# Patient Record
Sex: FEMALE | Race: WHITE | NOT HISPANIC OR LATINO | URBAN - METROPOLITAN AREA
[De-identification: names, ages, dates, MRNs, and addresses within clinical notes are randomized per-mention and may not be internally consistent; named-entity substitution may affect disease eponyms.]

---

## 2021-01-03 ENCOUNTER — EMERGENCY (EMERGENCY)
Facility: HOSPITAL | Age: 86
LOS: 1 days | Discharge: HOME | End: 2021-01-03
Attending: EMERGENCY MEDICINE
Payer: COMMERCIAL

## 2021-01-03 VITALS
TEMPERATURE: 98 F | RESPIRATION RATE: 18 BRPM | DIASTOLIC BLOOD PRESSURE: 81 MMHG | HEART RATE: 83 BPM | OXYGEN SATURATION: 100 % | SYSTOLIC BLOOD PRESSURE: 157 MMHG

## 2021-01-03 VITALS
HEIGHT: 68 IN | WEIGHT: 199.96 LBS | RESPIRATION RATE: 16 BRPM | DIASTOLIC BLOOD PRESSURE: 99 MMHG | SYSTOLIC BLOOD PRESSURE: 194 MMHG | OXYGEN SATURATION: 99 % | TEMPERATURE: 98 F | HEART RATE: 88 BPM

## 2021-01-03 PROCEDURE — 99283 EMERGENCY DEPT VISIT LOW MDM: CPT

## 2021-01-03 PROCEDURE — 99284 EMERGENCY DEPT VISIT MOD MDM: CPT

## 2021-01-03 RX ORDER — HYDRALAZINE HCL 50 MG
50 TABLET ORAL ONCE
Refills: 0 | Status: COMPLETED | OUTPATIENT
Start: 2021-01-03 | End: 2021-01-03

## 2021-01-03 RX ADMIN — Medication 50 MILLIGRAM(S): at 15:04

## 2021-01-03 NOTE — ED PROVIDER NOTE - ATTENDING CONTRIBUTION TO CARE
89y F hx HTN on multiple BP medications here with asx HTN. Pt supposed to have started an additional med, hydralazine, Rx by cards last week, but has not yet. No CP, back pain, abd pain, slurred speech, weakness, numbness, tingling, HA, vision changes. BP currently 161/97. Pt has Rx filled at pharmacy. Can dose here. No ED W/U indicated for asx HTN at this time. D/w son by resident. Will give return precautions for signs of HTN urgency/emergency.

## 2021-01-03 NOTE — ED PROVIDER NOTE - CLINICAL SUMMARY MEDICAL DECISION MAKING FREE TEXT BOX
Julian (PGY-3): 89-yo F w/ PMH of HTN, presenting with high blood pressure reading at home with 210s/110s, s/p furosemide 40 mg at home. 194/99 on arrival, down to 161/97 w/o further med. Patient is supposed to be starting hydralazine 50 mg TID, not picked up. Will provide 1 dose hydralazine 50 mg and monitor response. Will discharge after. Julian (PGY-3): 89-yo F w/ PMH of HTN, presenting with high blood pressure reading at home with 210s/110s, s/p furosemide 40 mg at home. 194/99 on arrival, down to 161/97 w/o further med. Patient is supposed to be starting hydralazine 50 mg TID, not picked up. Will provide 1 dose hydralazine 50 mg and monitor response. Will discharge after. D/w son. Advised pt asx, and will need to obtain meds and FU with PCP.

## 2021-01-03 NOTE — ED ADULT NURSE NOTE - NSIMPLEMENTINTERV_GEN_ALL_ED
Implemented All Fall with Harm Risk Interventions:  [place] to call system. Call bell, personal items and telephone within reach. Instruct patient to call for assistance. Room bathroom lighting operational. Non-slip footwear when patient is off stretcher. Physically safe environment: no spills, clutter or unnecessary equipment. Stretcher in lowest position, wheels locked, appropriate side rails in place. Provide visual cue, wrist band, yellow gown, etc. Monitor gait and stability. Monitor for mental status changes and reorient to person, place, and time. Review medications for side effects contributing to fall risk. Reinforce activity limits and safety measures with patient and family. Provide visual clues: red socks.

## 2021-01-03 NOTE — ED ADULT NURSE NOTE - OBJECTIVE STATEMENT
89 y/o female PMH HTN presents to ED reporting high blood pressure. Pt reports son checked BP today, and discovered it was elevated. Pt reports headache yesterday evening and took Tylenol, relieved by Tylenol. On exam, AOx3 speaking in complete sentences. Unlabored, spontaneous respirations, NAD, O2 sat 97% RA Abdomen soft, non-tender, non-distended. Pt denies CP, SOB, n/v/d, fever/chills, urinary symptoms, h/a and blurry vision. Awaiting evaluation by MD at this time.

## 2021-01-03 NOTE — ED PROVIDER NOTE - PATIENT PORTAL LINK FT
You can access the FollowMyHealth Patient Portal offered by [facility] by registering at the following website: [email or web address]. By joining [facility]’s FollowMyHealth portal, you will also be able to view your health information using other applications (apps) compatible with our system.

## 2021-01-03 NOTE — ED PROVIDER NOTE - OBJECTIVE STATEMENT
89-yo F w/ PMH of HTN, presenting with high blood pressure reading. Per son, BP 210s/110s at home AM. Does not check BP regularly. SBP at 170s at baseline. Took furosemide 40 mg only and prompted to ED. Did not complain of headache, nausea, or vomiting this AM. Had HA last night but not this AM. Patient is on carvedilol 25 mg BID, furosemide 40 mg QD, and lisinopril 20 mg QD. Cardiologist (Dr. Han) started on new med (hydralazine 50 mg TID) last week, however son did not have the chance to pick it up. Currently denies fever, chills, headache, blurry vision, SOB, chest pain, palpitations, abdominal pain, or dizziness.

## 2021-01-03 NOTE — ED ADULT TRIAGE NOTE - CHIEF COMPLAINT QUOTE
my so took my blood pressure and he said it was high pt feels ok no pain anywhere Pt did not take her blood pressure medication tosay

## 2021-12-22 NOTE — ED PROVIDER NOTE - CCCP TRG CHIEF CMPLNT
Hpi Title: Evaluation of Skin Lesions
Family Member: mother, father
Year Removed: 1900
pt states her blood pressure was high at home/see chief complaint quote

## 2023-04-13 ENCOUNTER — INPATIENT (INPATIENT)
Facility: HOSPITAL | Age: 88
LOS: 5 days | Discharge: NURSING, REHAB OR LONG-TERM CARE | DRG: 522 | End: 2023-04-19
Attending: GENERAL ACUTE CARE HOSPITAL | Admitting: GENERAL ACUTE CARE HOSPITAL
Payer: COMMERCIAL

## 2023-04-13 ENCOUNTER — TRANSCRIPTION ENCOUNTER (OUTPATIENT)
Age: 88
End: 2023-04-13

## 2023-04-13 VITALS
OXYGEN SATURATION: 96 % | SYSTOLIC BLOOD PRESSURE: 134 MMHG | WEIGHT: 149.91 LBS | RESPIRATION RATE: 15 BRPM | HEART RATE: 65 BPM | DIASTOLIC BLOOD PRESSURE: 72 MMHG | HEIGHT: 62 IN | TEMPERATURE: 99 F

## 2023-04-13 LAB
ALBUMIN SERPL ELPH-MCNC: 4.4 G/DL — SIGNIFICANT CHANGE UP (ref 3.3–5)
ALP SERPL-CCNC: 65 U/L — SIGNIFICANT CHANGE UP (ref 40–120)
ALT FLD-CCNC: 13 U/L — SIGNIFICANT CHANGE UP (ref 10–45)
ANION GAP SERPL CALC-SCNC: 14 MMOL/L — SIGNIFICANT CHANGE UP (ref 5–17)
APTT BLD: 28.8 SEC — SIGNIFICANT CHANGE UP (ref 27.5–35.5)
AST SERPL-CCNC: 21 U/L — SIGNIFICANT CHANGE UP (ref 10–40)
BASOPHILS # BLD AUTO: 0.02 K/UL — SIGNIFICANT CHANGE UP (ref 0–0.2)
BASOPHILS NFR BLD AUTO: 0.2 % — SIGNIFICANT CHANGE UP (ref 0–2)
BILIRUB SERPL-MCNC: 0.7 MG/DL — SIGNIFICANT CHANGE UP (ref 0.2–1.2)
BLD GP AB SCN SERPL QL: NEGATIVE — SIGNIFICANT CHANGE UP
BUN SERPL-MCNC: 35 MG/DL — HIGH (ref 7–23)
CALCIUM SERPL-MCNC: 10.3 MG/DL — SIGNIFICANT CHANGE UP (ref 8.4–10.5)
CHLORIDE SERPL-SCNC: 102 MMOL/L — SIGNIFICANT CHANGE UP (ref 96–108)
CO2 SERPL-SCNC: 22 MMOL/L — SIGNIFICANT CHANGE UP (ref 22–31)
CREAT SERPL-MCNC: 1.81 MG/DL — HIGH (ref 0.5–1.3)
EGFR: 25 ML/MIN/1.73M2 — LOW
EOSINOPHIL # BLD AUTO: 0.02 K/UL — SIGNIFICANT CHANGE UP (ref 0–0.5)
EOSINOPHIL NFR BLD AUTO: 0.2 % — SIGNIFICANT CHANGE UP (ref 0–6)
GLUCOSE SERPL-MCNC: 116 MG/DL — HIGH (ref 70–99)
HCT VFR BLD CALC: 32 % — LOW (ref 34.5–45)
HGB BLD-MCNC: 10.2 G/DL — LOW (ref 11.5–15.5)
IMM GRANULOCYTES NFR BLD AUTO: 0.6 % — SIGNIFICANT CHANGE UP (ref 0–0.9)
INR BLD: 1.11 RATIO — SIGNIFICANT CHANGE UP (ref 0.88–1.16)
LYMPHOCYTES # BLD AUTO: 1.28 K/UL — SIGNIFICANT CHANGE UP (ref 1–3.3)
LYMPHOCYTES # BLD AUTO: 10.2 % — LOW (ref 13–44)
MCHC RBC-ENTMCNC: 30.3 PG — SIGNIFICANT CHANGE UP (ref 27–34)
MCHC RBC-ENTMCNC: 31.9 GM/DL — LOW (ref 32–36)
MCV RBC AUTO: 95 FL — SIGNIFICANT CHANGE UP (ref 80–100)
MONOCYTES # BLD AUTO: 0.28 K/UL — SIGNIFICANT CHANGE UP (ref 0–0.9)
MONOCYTES NFR BLD AUTO: 2.2 % — SIGNIFICANT CHANGE UP (ref 2–14)
NEUTROPHILS # BLD AUTO: 10.84 K/UL — HIGH (ref 1.8–7.4)
NEUTROPHILS NFR BLD AUTO: 86.6 % — HIGH (ref 43–77)
NRBC # BLD: 0 /100 WBCS — SIGNIFICANT CHANGE UP (ref 0–0)
PLATELET # BLD AUTO: 190 K/UL — SIGNIFICANT CHANGE UP (ref 150–400)
POTASSIUM SERPL-MCNC: 4.5 MMOL/L — SIGNIFICANT CHANGE UP (ref 3.5–5.3)
POTASSIUM SERPL-SCNC: 4.5 MMOL/L — SIGNIFICANT CHANGE UP (ref 3.5–5.3)
PROT SERPL-MCNC: 7.9 G/DL — SIGNIFICANT CHANGE UP (ref 6–8.3)
PROTHROM AB SERPL-ACNC: 12.9 SEC — SIGNIFICANT CHANGE UP (ref 10.5–13.4)
RBC # BLD: 3.37 M/UL — LOW (ref 3.8–5.2)
RBC # FLD: 13 % — SIGNIFICANT CHANGE UP (ref 10.3–14.5)
RH IG SCN BLD-IMP: POSITIVE — SIGNIFICANT CHANGE UP
SODIUM SERPL-SCNC: 138 MMOL/L — SIGNIFICANT CHANGE UP (ref 135–145)
WBC # BLD: 12.51 K/UL — HIGH (ref 3.8–10.5)
WBC # FLD AUTO: 12.51 K/UL — HIGH (ref 3.8–10.5)

## 2023-04-13 PROCEDURE — 73552 X-RAY EXAM OF FEMUR 2/>: CPT | Mod: 26,LT

## 2023-04-13 PROCEDURE — 99285 EMERGENCY DEPT VISIT HI MDM: CPT

## 2023-04-13 PROCEDURE — 71045 X-RAY EXAM CHEST 1 VIEW: CPT | Mod: 26

## 2023-04-13 PROCEDURE — 73502 X-RAY EXAM HIP UNI 2-3 VIEWS: CPT | Mod: 26,LT

## 2023-04-13 RX ORDER — SODIUM CHLORIDE 9 MG/ML
1000 INJECTION INTRAMUSCULAR; INTRAVENOUS; SUBCUTANEOUS
Refills: 0 | Status: DISCONTINUED | OUTPATIENT
Start: 2023-04-13 | End: 2023-04-14

## 2023-04-13 RX ORDER — ACETAMINOPHEN 500 MG
975 TABLET ORAL EVERY 8 HOURS
Refills: 0 | Status: DISCONTINUED | OUTPATIENT
Start: 2023-04-13 | End: 2023-04-14

## 2023-04-13 RX ORDER — MORPHINE SULFATE 50 MG/1
2 CAPSULE, EXTENDED RELEASE ORAL ONCE
Refills: 0 | Status: DISCONTINUED | OUTPATIENT
Start: 2023-04-13 | End: 2023-04-13

## 2023-04-13 RX ORDER — OXYCODONE HYDROCHLORIDE 5 MG/1
5 TABLET ORAL EVERY 4 HOURS
Refills: 0 | Status: DISCONTINUED | OUTPATIENT
Start: 2023-04-13 | End: 2023-04-14

## 2023-04-13 RX ORDER — OXYCODONE HYDROCHLORIDE 5 MG/1
2.5 TABLET ORAL EVERY 4 HOURS
Refills: 0 | Status: DISCONTINUED | OUTPATIENT
Start: 2023-04-13 | End: 2023-04-14

## 2023-04-13 RX ORDER — SODIUM CHLORIDE 9 MG/ML
500 INJECTION INTRAMUSCULAR; INTRAVENOUS; SUBCUTANEOUS ONCE
Refills: 0 | Status: COMPLETED | OUTPATIENT
Start: 2023-04-13 | End: 2023-04-13

## 2023-04-13 RX ADMIN — MORPHINE SULFATE 2 MILLIGRAM(S): 50 CAPSULE, EXTENDED RELEASE ORAL at 21:31

## 2023-04-13 RX ADMIN — MORPHINE SULFATE 2 MILLIGRAM(S): 50 CAPSULE, EXTENDED RELEASE ORAL at 21:01

## 2023-04-13 RX ADMIN — SODIUM CHLORIDE 500 MILLILITER(S): 9 INJECTION INTRAMUSCULAR; INTRAVENOUS; SUBCUTANEOUS at 23:48

## 2023-04-13 NOTE — ED PROVIDER NOTE - OBJECTIVE STATEMENT
89-year-old female chief complaint left hip pain.  History of hypertension hyperlipidemia. Patient noted a fall this evening. Patient states she lost her balance and fell onto her left side. Patient is normally walking at baseline but has not been able to walk since the fall.

## 2023-04-13 NOTE — H&P ADULT - ATTENDING COMMENTS
Associated images, notes, and labs were reviewed. The patient was seen and examined. Risks and benefits of operative versus nonoperative management were discussed with the patient. The patient showed a good understanding of the injury and the treatment options. They would like to move forward with operative management of the left femoral neck fracture.

## 2023-04-13 NOTE — H&P ADULT - HISTORY OF PRESENT ILLNESS
HPI  89yFemale w/ PMH of HTN and HLD c/o L hip pain s/p mechanical fall. Pt was attempting to reach to pick something off the floor when she lost her balance and fell onto her left side. Unable to bear weight in the LLE since the fall. Denies headstrike or LOC. Denies numbness/tingling in the LLE. Denies any other trauma/injuries at this time. At baseline, community ambulator w/ cane.    ROS  Negative unless otherwise specified in HPI.    PAST MEDICAL & SURGICAL Hx  PAST MEDICAL & SURGICAL HISTORY:  Hypertension  No significant past surgical history          MEDICATIONS  Home Medications:      ALLERGIES  No Known Allergies      FAMILY Hx  FAMILY HISTORY:  No pertinent family history in first degree relatives        SOCIAL Hx  Social History:      VITALS  Vital Signs Last 24 Hrs  T(C): 37.3 (13 Apr 2023 20:19), Max: 37.3 (13 Apr 2023 20:19)  T(F): 99.1 (13 Apr 2023 20:19), Max: 99.1 (13 Apr 2023 20:19)  HR: 65 (13 Apr 2023 20:19) (65 - 65)  BP: 134/72 (13 Apr 2023 20:19) (134/72 - 134/72)  BP(mean): --  RR: 15 (13 Apr 2023 20:19) (15 - 15)  SpO2: 96% (13 Apr 2023 20:19) (96% - 96%)    Parameters below as of 13 Apr 2023 20:19  Patient On (Oxygen Delivery Method): room air    PHYSICAL EXAM  Gen: Lying in bed, NAD  Resp: No increased WOB  LLE:  Skin intact, +edema L hip  +TTP over L hip, no TTP along remainder of extremity; compartments soft  Limited ROM at hip 2/2 pain  +Pain with axial loading  Motor: TA/EHL/GS/FHL intact  Sensory: DP/SP/Tib/Devorah/Saph SILT  +DP pulse, WWP    Secondary survey:  No TTP along spine or other extremities, pelvis grossly stable, SILT and compartments soft throughout    LABS                        10.2   12.51 )-----------( 190      ( 13 Apr 2023 21:21 )             32.0     04-13    138  |  102  |  35<H>  ----------------------------<  116<H>  4.5   |  22  |  1.81<H>    Ca    10.3      13 Apr 2023 21:21    TPro  7.9  /  Alb  4.4  /  TBili  0.7  /  DBili  x   /  AST  21  /  ALT  13  /  AlkPhos  65  04-13    PT/INR - ( 13 Apr 2023 21:21 )   PT: 12.9 sec;   INR: 1.11 ratio         PTT - ( 13 Apr 2023 21:21 )  PTT:28.8 sec    IMAGING  XRs: L displaced transcervical femoral neck fracture

## 2023-04-13 NOTE — ED PROVIDER NOTE - CLINICAL SUMMARY MEDICAL DECISION MAKING FREE TEXT BOX
89-year-old female chief complaint left hip pain after fall.  Given history and physical concern for fracture. Attending (Julian Paredes D.O.):  89-year-old female with a history of hypertension, high cholesterol, chronic lower extremity swelling since birth here after sustaining mechanical fall while walking.  Patient fell on left hip cannot stand afterwards.  Denies hitting head, not on antiplatelet or anticoagulant.  Never had prior surgeries on hips before.  Hemodynamically stable here no acute distress, ABCs intact, GCS 15.  Stable chest wall and stable pelvis, no midline spinal tenderness, no signs of basilar skull fracture, clear lungs no signs of pneumothorax.  2+ distal pulses no signs of acute vascular injury.  Positive left femoral head tenderness palpation without external bruising visualized, slightly short, unable to lift left lower extremity to gravity, able to lift right lower extremity to gravity (baseline) full strength in upper extremities.  History exam favors left femur fracture, will need to eval to see if this is fracture and dislocation.  No other signs of traumatic injury.  Will obtain preoperative labs, imaging dose analgesia consult orthopedics. -> disagree with rad resident prelim. No signs of bone sclerosis. This is acute fracture. -> patient signed out pending remainder of w/u, close reassessments, discussion of results, dispo.

## 2023-04-13 NOTE — H&P ADULT - ASSESSMENT
ASSESSMENT & PLAN  89yFemale w/ L femoral neck fx.    -NWB LLE, bedrest  - FU med rec  -OR tomorrow   -f/u preop: CBC, BMP, coags, T&S x2, CXR, EKG, COVID  -NPO past midnight, IVF  -hold chemical DVT ppx for OR; SCDs OK  -FU medical clearance  -pain control  -ice/cold compress    For all questions related to patient care, please reach out to the on-call team via the pager.       Orthopaedic Surgery  [facility] [phone]  [facility] [phone]  [facility] [phone]

## 2023-04-13 NOTE — ED PROVIDER NOTE - ATTENDING CONTRIBUTION TO CARE
Attending (Julian Paredes D.O.):  I have personally seen and examined this patient. I have performed a substantive portion of the visit including all aspects of the medical decision making. Resident, fellow, student, and/or ACP note reviewed. I agree on the plan of care except where noted.    see mdm

## 2023-04-13 NOTE — ED PROVIDER NOTE - PHYSICAL EXAMINATION
GENERAL: Awake, alert, NAD  LUNGS: CTAB, no wheezes or crackles   CARDIAC: RRR, no m/r/g  ABDOMEN: Soft, , non tender, non distended, no rebound, no guarding  BACK: No midline spinal tenderness, no CVA tenderness  EXT: Pain left lower extremity/hip.  Neurovascular intact.  NEURO: A&Ox3. Moving all extremities.  SKIN: Warm and dry. No rash.  PSYCH: Normal affect.

## 2023-04-13 NOTE — ED PROVIDER NOTE - PROGRESS NOTE DETAILS
89-year-old with fall mechanical resulting in inability to left hip flexion and pain with x-rays suggestive awaiting Ortho evaluation likely admission given inability to ambulate. received sign out Dr Paredes. DJ

## 2023-04-14 ENCOUNTER — TRANSCRIPTION ENCOUNTER (OUTPATIENT)
Age: 88
End: 2023-04-14

## 2023-04-14 DIAGNOSIS — S72.009A FRACTURE OF UNSPECIFIED PART OF NECK OF UNSPECIFIED FEMUR, INITIAL ENCOUNTER FOR CLOSED FRACTURE: ICD-10-CM

## 2023-04-14 DIAGNOSIS — S72.002A FRACTURE OF UNSPECIFIED PART OF NECK OF LEFT FEMUR, INITIAL ENCOUNTER FOR CLOSED FRACTURE: ICD-10-CM

## 2023-04-14 DIAGNOSIS — I10 ESSENTIAL (PRIMARY) HYPERTENSION: ICD-10-CM

## 2023-04-14 DIAGNOSIS — R52 PAIN, UNSPECIFIED: ICD-10-CM

## 2023-04-14 LAB
ALBUMIN SERPL ELPH-MCNC: 4.1 G/DL — SIGNIFICANT CHANGE UP (ref 3.3–5)
ALP SERPL-CCNC: 63 U/L — SIGNIFICANT CHANGE UP (ref 40–120)
ALT FLD-CCNC: 15 U/L — SIGNIFICANT CHANGE UP (ref 10–45)
ANION GAP SERPL CALC-SCNC: 12 MMOL/L — SIGNIFICANT CHANGE UP (ref 5–17)
ANION GAP SERPL CALC-SCNC: 15 MMOL/L — SIGNIFICANT CHANGE UP (ref 5–17)
APTT BLD: 29.2 SEC — SIGNIFICANT CHANGE UP (ref 27.5–35.5)
AST SERPL-CCNC: 23 U/L — SIGNIFICANT CHANGE UP (ref 10–40)
BASOPHILS # BLD AUTO: 0.02 K/UL — SIGNIFICANT CHANGE UP (ref 0–0.2)
BASOPHILS # BLD AUTO: 0.03 K/UL — SIGNIFICANT CHANGE UP (ref 0–0.2)
BASOPHILS NFR BLD AUTO: 0.2 % — SIGNIFICANT CHANGE UP (ref 0–2)
BASOPHILS NFR BLD AUTO: 0.3 % — SIGNIFICANT CHANGE UP (ref 0–2)
BILIRUB SERPL-MCNC: 0.8 MG/DL — SIGNIFICANT CHANGE UP (ref 0.2–1.2)
BLD GP AB SCN SERPL QL: NEGATIVE — SIGNIFICANT CHANGE UP
BUN SERPL-MCNC: 29 MG/DL — HIGH (ref 7–23)
BUN SERPL-MCNC: 33 MG/DL — HIGH (ref 7–23)
CALCIUM SERPL-MCNC: 10 MG/DL — SIGNIFICANT CHANGE UP (ref 8.4–10.5)
CALCIUM SERPL-MCNC: 9.2 MG/DL — SIGNIFICANT CHANGE UP (ref 8.4–10.5)
CHLORIDE SERPL-SCNC: 103 MMOL/L — SIGNIFICANT CHANGE UP (ref 96–108)
CHLORIDE SERPL-SCNC: 106 MMOL/L — SIGNIFICANT CHANGE UP (ref 96–108)
CO2 SERPL-SCNC: 21 MMOL/L — LOW (ref 22–31)
CO2 SERPL-SCNC: 21 MMOL/L — LOW (ref 22–31)
CREAT SERPL-MCNC: 1.53 MG/DL — HIGH (ref 0.5–1.3)
CREAT SERPL-MCNC: 1.62 MG/DL — HIGH (ref 0.5–1.3)
EGFR: 29 ML/MIN/1.73M2 — LOW
EGFR: 31 ML/MIN/1.73M2 — LOW
EOSINOPHIL # BLD AUTO: 0.02 K/UL — SIGNIFICANT CHANGE UP (ref 0–0.5)
EOSINOPHIL # BLD AUTO: 0.06 K/UL — SIGNIFICANT CHANGE UP (ref 0–0.5)
EOSINOPHIL NFR BLD AUTO: 0.2 % — SIGNIFICANT CHANGE UP (ref 0–6)
EOSINOPHIL NFR BLD AUTO: 0.5 % — SIGNIFICANT CHANGE UP (ref 0–6)
GLUCOSE SERPL-MCNC: 132 MG/DL — HIGH (ref 70–99)
GLUCOSE SERPL-MCNC: 142 MG/DL — HIGH (ref 70–99)
HCT VFR BLD CALC: 31.8 % — LOW (ref 34.5–45)
HCT VFR BLD CALC: 32.9 % — LOW (ref 34.5–45)
HGB BLD-MCNC: 10.5 G/DL — LOW (ref 11.5–15.5)
HGB BLD-MCNC: 9.9 G/DL — LOW (ref 11.5–15.5)
IMM GRANULOCYTES NFR BLD AUTO: 0.5 % — SIGNIFICANT CHANGE UP (ref 0–0.9)
IMM GRANULOCYTES NFR BLD AUTO: 0.6 % — SIGNIFICANT CHANGE UP (ref 0–0.9)
INR BLD: 1.15 RATIO — SIGNIFICANT CHANGE UP (ref 0.88–1.16)
LACTATE SERPL-SCNC: 1.2 MMOL/L — SIGNIFICANT CHANGE UP (ref 0.5–2)
LYMPHOCYTES # BLD AUTO: 0.73 K/UL — LOW (ref 1–3.3)
LYMPHOCYTES # BLD AUTO: 1.15 K/UL — SIGNIFICANT CHANGE UP (ref 1–3.3)
LYMPHOCYTES # BLD AUTO: 10.5 % — LOW (ref 13–44)
LYMPHOCYTES # BLD AUTO: 6.2 % — LOW (ref 13–44)
MCHC RBC-ENTMCNC: 29.8 PG — SIGNIFICANT CHANGE UP (ref 27–34)
MCHC RBC-ENTMCNC: 30.3 PG — SIGNIFICANT CHANGE UP (ref 27–34)
MCHC RBC-ENTMCNC: 31.1 GM/DL — LOW (ref 32–36)
MCHC RBC-ENTMCNC: 31.9 GM/DL — LOW (ref 32–36)
MCV RBC AUTO: 95.1 FL — SIGNIFICANT CHANGE UP (ref 80–100)
MCV RBC AUTO: 95.8 FL — SIGNIFICANT CHANGE UP (ref 80–100)
MONOCYTES # BLD AUTO: 0.3 K/UL — SIGNIFICANT CHANGE UP (ref 0–0.9)
MONOCYTES # BLD AUTO: 0.44 K/UL — SIGNIFICANT CHANGE UP (ref 0–0.9)
MONOCYTES NFR BLD AUTO: 2.6 % — SIGNIFICANT CHANGE UP (ref 2–14)
MONOCYTES NFR BLD AUTO: 4 % — SIGNIFICANT CHANGE UP (ref 2–14)
NEUTROPHILS # BLD AUTO: 10.55 K/UL — HIGH (ref 1.8–7.4)
NEUTROPHILS # BLD AUTO: 9.29 K/UL — HIGH (ref 1.8–7.4)
NEUTROPHILS NFR BLD AUTO: 84.6 % — HIGH (ref 43–77)
NEUTROPHILS NFR BLD AUTO: 89.8 % — HIGH (ref 43–77)
NRBC # BLD: 0 /100 WBCS — SIGNIFICANT CHANGE UP (ref 0–0)
NRBC # BLD: 0 /100 WBCS — SIGNIFICANT CHANGE UP (ref 0–0)
PLATELET # BLD AUTO: 157 K/UL — SIGNIFICANT CHANGE UP (ref 150–400)
PLATELET # BLD AUTO: 180 K/UL — SIGNIFICANT CHANGE UP (ref 150–400)
POTASSIUM SERPL-MCNC: 4.3 MMOL/L — SIGNIFICANT CHANGE UP (ref 3.5–5.3)
POTASSIUM SERPL-MCNC: 4.4 MMOL/L — SIGNIFICANT CHANGE UP (ref 3.5–5.3)
POTASSIUM SERPL-SCNC: 4.3 MMOL/L — SIGNIFICANT CHANGE UP (ref 3.5–5.3)
POTASSIUM SERPL-SCNC: 4.4 MMOL/L — SIGNIFICANT CHANGE UP (ref 3.5–5.3)
PROT SERPL-MCNC: 7.5 G/DL — SIGNIFICANT CHANGE UP (ref 6–8.3)
PROTHROM AB SERPL-ACNC: 13.4 SEC — SIGNIFICANT CHANGE UP (ref 10.5–13.4)
RBC # BLD: 3.32 M/UL — LOW (ref 3.8–5.2)
RBC # BLD: 3.46 M/UL — LOW (ref 3.8–5.2)
RBC # FLD: 13 % — SIGNIFICANT CHANGE UP (ref 10.3–14.5)
RBC # FLD: 13.1 % — SIGNIFICANT CHANGE UP (ref 10.3–14.5)
RH IG SCN BLD-IMP: POSITIVE — SIGNIFICANT CHANGE UP
SARS-COV-2 RNA SPEC QL NAA+PROBE: SIGNIFICANT CHANGE UP
SODIUM SERPL-SCNC: 139 MMOL/L — SIGNIFICANT CHANGE UP (ref 135–145)
SODIUM SERPL-SCNC: 139 MMOL/L — SIGNIFICANT CHANGE UP (ref 135–145)
WBC # BLD: 10.97 K/UL — HIGH (ref 3.8–10.5)
WBC # BLD: 11.74 K/UL — HIGH (ref 3.8–10.5)
WBC # FLD AUTO: 10.97 K/UL — HIGH (ref 3.8–10.5)
WBC # FLD AUTO: 11.74 K/UL — HIGH (ref 3.8–10.5)

## 2023-04-14 PROCEDURE — 27236 TREAT THIGH FRACTURE: CPT | Mod: 59,LT

## 2023-04-14 PROCEDURE — 73501 X-RAY EXAM HIP UNI 1 VIEW: CPT | Mod: 26,LT

## 2023-04-14 PROCEDURE — 99221 1ST HOSP IP/OBS SF/LOW 40: CPT | Mod: GC

## 2023-04-14 DEVICE — HEAD FEMORAL: Type: IMPLANTABLE DEVICE | Site: LEFT | Status: FUNCTIONAL

## 2023-04-14 DEVICE — STEM NECK ANG HIP 132 DEG V40 SZ5 35X108MM: Type: IMPLANTABLE DEVICE | Site: LEFT | Status: FUNCTIONAL

## 2023-04-14 DEVICE — COMP 46MM X 28MM BIPOLAR: Type: IMPLANTABLE DEVICE | Site: LEFT | Status: FUNCTIONAL

## 2023-04-14 RX ORDER — ONDANSETRON 8 MG/1
4 TABLET, FILM COATED ORAL ONCE
Refills: 0 | Status: DISCONTINUED | OUTPATIENT
Start: 2023-04-14 | End: 2023-04-14

## 2023-04-14 RX ORDER — ACETAMINOPHEN 500 MG
3 TABLET ORAL
Qty: 0 | Refills: 0 | DISCHARGE
Start: 2023-04-14

## 2023-04-14 RX ORDER — HYDRALAZINE HCL 50 MG
100 TABLET ORAL
Refills: 0 | Status: DISCONTINUED | OUTPATIENT
Start: 2023-04-14 | End: 2023-04-19

## 2023-04-14 RX ORDER — TRAMADOL HYDROCHLORIDE 50 MG/1
0.5 TABLET ORAL
Qty: 0 | Refills: 0 | DISCHARGE
Start: 2023-04-14

## 2023-04-14 RX ORDER — PANTOPRAZOLE SODIUM 20 MG/1
1 TABLET, DELAYED RELEASE ORAL
Qty: 0 | Refills: 0 | DISCHARGE
Start: 2023-04-14

## 2023-04-14 RX ORDER — TRAMADOL HYDROCHLORIDE 50 MG/1
1 TABLET ORAL
Qty: 0 | Refills: 0 | DISCHARGE
Start: 2023-04-14

## 2023-04-14 RX ORDER — TRAMADOL HYDROCHLORIDE 50 MG/1
50 TABLET ORAL EVERY 6 HOURS
Refills: 0 | Status: DISCONTINUED | OUTPATIENT
Start: 2023-04-14 | End: 2023-04-19

## 2023-04-14 RX ORDER — CEFAZOLIN SODIUM 1 G
2000 VIAL (EA) INJECTION EVERY 8 HOURS
Refills: 0 | Status: COMPLETED | OUTPATIENT
Start: 2023-04-14 | End: 2023-04-15

## 2023-04-14 RX ORDER — AMLODIPINE BESYLATE 2.5 MG/1
10 TABLET ORAL DAILY
Refills: 0 | Status: DISCONTINUED | OUTPATIENT
Start: 2023-04-15 | End: 2023-04-19

## 2023-04-14 RX ORDER — SODIUM CHLORIDE 9 MG/ML
1000 INJECTION INTRAMUSCULAR; INTRAVENOUS; SUBCUTANEOUS
Refills: 0 | Status: DISCONTINUED | OUTPATIENT
Start: 2023-04-14 | End: 2023-04-19

## 2023-04-14 RX ORDER — CHOLECALCIFEROL (VITAMIN D3) 125 MCG
2000 CAPSULE ORAL DAILY
Refills: 0 | Status: DISCONTINUED | OUTPATIENT
Start: 2023-04-14 | End: 2023-04-19

## 2023-04-14 RX ORDER — MAGNESIUM HYDROXIDE 400 MG/1
30 TABLET, CHEWABLE ORAL
Qty: 0 | Refills: 0 | DISCHARGE
Start: 2023-04-14

## 2023-04-14 RX ORDER — ONDANSETRON 8 MG/1
4 TABLET, FILM COATED ORAL EVERY 6 HOURS
Refills: 0 | Status: DISCONTINUED | OUTPATIENT
Start: 2023-04-14 | End: 2023-04-19

## 2023-04-14 RX ORDER — OXYCODONE HYDROCHLORIDE 5 MG/1
5 TABLET ORAL EVERY 4 HOURS
Refills: 0 | Status: DISCONTINUED | OUTPATIENT
Start: 2023-04-14 | End: 2023-04-14

## 2023-04-14 RX ORDER — CARVEDILOL PHOSPHATE 80 MG/1
25 CAPSULE, EXTENDED RELEASE ORAL EVERY 12 HOURS
Refills: 0 | Status: DISCONTINUED | OUTPATIENT
Start: 2023-04-14 | End: 2023-04-19

## 2023-04-14 RX ORDER — TIMOLOL 0.5 %
1 DROPS OPHTHALMIC (EYE) EVERY 12 HOURS
Refills: 0 | Status: DISCONTINUED | OUTPATIENT
Start: 2023-04-14 | End: 2023-04-19

## 2023-04-14 RX ORDER — CHOLECALCIFEROL (VITAMIN D3) 125 MCG
2000 CAPSULE ORAL
Qty: 0 | Refills: 0 | DISCHARGE
Start: 2023-04-14

## 2023-04-14 RX ORDER — TRAMADOL HYDROCHLORIDE 50 MG/1
25 TABLET ORAL EVERY 6 HOURS
Refills: 0 | Status: DISCONTINUED | OUTPATIENT
Start: 2023-04-14 | End: 2023-04-19

## 2023-04-14 RX ORDER — ACETAMINOPHEN 500 MG
975 TABLET ORAL EVERY 8 HOURS
Refills: 0 | Status: DISCONTINUED | OUTPATIENT
Start: 2023-04-14 | End: 2023-04-19

## 2023-04-14 RX ORDER — HYDROMORPHONE HYDROCHLORIDE 2 MG/ML
0.25 INJECTION INTRAMUSCULAR; INTRAVENOUS; SUBCUTANEOUS
Refills: 0 | Status: DISCONTINUED | OUTPATIENT
Start: 2023-04-14 | End: 2023-04-14

## 2023-04-14 RX ORDER — MAGNESIUM HYDROXIDE 400 MG/1
30 TABLET, CHEWABLE ORAL DAILY
Refills: 0 | Status: DISCONTINUED | OUTPATIENT
Start: 2023-04-14 | End: 2023-04-19

## 2023-04-14 RX ORDER — PANTOPRAZOLE SODIUM 20 MG/1
40 TABLET, DELAYED RELEASE ORAL
Refills: 0 | Status: DISCONTINUED | OUTPATIENT
Start: 2023-04-14 | End: 2023-04-19

## 2023-04-14 RX ORDER — POLYETHYLENE GLYCOL 3350 17 G/17G
17 POWDER, FOR SOLUTION ORAL
Qty: 0 | Refills: 0 | DISCHARGE
Start: 2023-04-14

## 2023-04-14 RX ORDER — SIMVASTATIN 20 MG/1
10 TABLET, FILM COATED ORAL AT BEDTIME
Refills: 0 | Status: DISCONTINUED | OUTPATIENT
Start: 2023-04-14 | End: 2023-04-19

## 2023-04-14 RX ORDER — LANOLIN ALCOHOL/MO/W.PET/CERES
1 CREAM (GRAM) TOPICAL
Qty: 0 | Refills: 0 | DISCHARGE
Start: 2023-04-14

## 2023-04-14 RX ORDER — SENNA PLUS 8.6 MG/1
2 TABLET ORAL AT BEDTIME
Refills: 0 | Status: DISCONTINUED | OUTPATIENT
Start: 2023-04-14 | End: 2023-04-19

## 2023-04-14 RX ORDER — BRIMONIDINE TARTRATE 2 MG/MG
1 SOLUTION/ DROPS OPHTHALMIC
Refills: 0 | Status: DISCONTINUED | OUTPATIENT
Start: 2023-04-14 | End: 2023-04-19

## 2023-04-14 RX ORDER — ENOXAPARIN SODIUM 100 MG/ML
30 INJECTION SUBCUTANEOUS
Qty: 0 | Refills: 0 | DISCHARGE
Start: 2023-04-14

## 2023-04-14 RX ORDER — OXYCODONE HYDROCHLORIDE 5 MG/1
2.5 TABLET ORAL EVERY 4 HOURS
Refills: 0 | Status: DISCONTINUED | OUTPATIENT
Start: 2023-04-14 | End: 2023-04-14

## 2023-04-14 RX ORDER — SENNA PLUS 8.6 MG/1
2 TABLET ORAL
Qty: 0 | Refills: 0 | DISCHARGE
Start: 2023-04-14

## 2023-04-14 RX ORDER — LANOLIN ALCOHOL/MO/W.PET/CERES
3 CREAM (GRAM) TOPICAL AT BEDTIME
Refills: 0 | Status: DISCONTINUED | OUTPATIENT
Start: 2023-04-14 | End: 2023-04-19

## 2023-04-14 RX ORDER — POLYETHYLENE GLYCOL 3350 17 G/17G
17 POWDER, FOR SOLUTION ORAL DAILY
Refills: 0 | Status: DISCONTINUED | OUTPATIENT
Start: 2023-04-14 | End: 2023-04-19

## 2023-04-14 RX ORDER — ENOXAPARIN SODIUM 100 MG/ML
30 INJECTION SUBCUTANEOUS EVERY 24 HOURS
Refills: 0 | Status: DISCONTINUED | OUTPATIENT
Start: 2023-04-15 | End: 2023-04-18

## 2023-04-14 RX ADMIN — SENNA PLUS 2 TABLET(S): 8.6 TABLET ORAL at 21:39

## 2023-04-14 RX ADMIN — Medication 975 MILLIGRAM(S): at 06:22

## 2023-04-14 RX ADMIN — Medication 975 MILLIGRAM(S): at 05:22

## 2023-04-14 RX ADMIN — Medication 975 MILLIGRAM(S): at 21:39

## 2023-04-14 RX ADMIN — Medication 975 MILLIGRAM(S): at 22:39

## 2023-04-14 RX ADMIN — SIMVASTATIN 10 MILLIGRAM(S): 20 TABLET, FILM COATED ORAL at 21:39

## 2023-04-14 RX ADMIN — Medication 1 DROP(S): at 17:50

## 2023-04-14 RX ADMIN — SODIUM CHLORIDE 100 MILLILITER(S): 9 INJECTION INTRAMUSCULAR; INTRAVENOUS; SUBCUTANEOUS at 02:05

## 2023-04-14 RX ADMIN — CARVEDILOL PHOSPHATE 25 MILLIGRAM(S): 80 CAPSULE, EXTENDED RELEASE ORAL at 17:49

## 2023-04-14 RX ADMIN — BRIMONIDINE TARTRATE 1 DROP(S): 2 SOLUTION/ DROPS OPHTHALMIC at 17:49

## 2023-04-14 RX ADMIN — Medication 100 MILLIGRAM(S): at 20:03

## 2023-04-14 NOTE — CONSULT NOTE ADULT - PROBLEM SELECTOR RECOMMENDATION 9
Patient scheduled for an ORIF of the left hip  No contraindication to scheduled procedure  Patient is NPO  DVT and GI prophylaxis as per ortho

## 2023-04-14 NOTE — CHART NOTE - NSCHARTNOTEFT_GEN_A_CORE
Ortho PA Note    Called patients pharmacy ([facility] [phone]) to obtain home meds which are as follows:  1. Brimonidine 0.2% 1 gtt each eye BID  2. Hydralazine 100mg PO morning, evening, bedtime  3. Carvedilol 25mg PO BID  4. Simvastatin 10mg PO Bedtime  5. Timolol 0.5% 1 gtt each eye BID  6. Torsemide 20mg PO BID  7. Amlodipine 10mg PO Daily      CRUZ Carbajal  Orthopedic Surgery  [phone]

## 2023-04-14 NOTE — CHART NOTE - NSCHARTNOTEFT_GEN_A_CORE
ORTHOPEDIC POST OPERATIVE CHECK    Patient evaluated in the RR. Resting without complaints.  Patient denies Chest Pain, SOB, N/V. Reports pain is well controlled.     T(C): 35.8 (04-14-23 @ 15:00), Max: 37.3 (04-13-23 @ 20:19)  HR: 60 (04-14-23 @ 15:00) (56 - 90)  BP: 122/60 (04-14-23 @ 15:00) (102/55 - 154/74)  RR: 21 (04-14-23 @ 15:00) (15 - 22)  SpO2: 97% (04-14-23 @ 15:00) (87% - 100%)  Wt(kg): --    Exam:  Alert and [place], No Acute Distress  Card: +S1/S2, RRR  Pulm: CTAB  Laterality:  LLE: Aquacel clean dry and intact to the left hip  Calves soft, non-tender bilaterally  +PF/DF/EHL/FHL  SILT  + DP pulse    Xray: in chart, stable left hip hemiarthroplasty. FU official report.                           9.9    11.74 )-----------( 157      ( 14 Apr 2023 14:10 )             31.8    04-14    139  |  106  |  29<H>  ----------------------------<  132<H>  4.4   |  21<L>  |  1.53<H>    Ca    9.2      14 Apr 2023 14:10    TPro  7.5  /  Alb  4.1  /  TBili  0.8  /  DBili  x   /  AST  23  /  ALT  15  /  AlkPhos  63  04-14      A/P: 89yFemale S/p  L/R Total Hip/Knee Arthroplasty. VSS. NAD  -PT/OT-WBAT With Posterior Hip Precautions  -Patient on NC at this time, will continue to monitor O2 sat and wean as tolerated. Patient not currently on home O2. IS encouraged, at bedside.   -OOB  -DVT PPx: Lovenox  -GI PPx: protonix  -Pain Control  -Continue Current Tx  -FU AM labs - monitor H/H, will transfuse as needed.  -Dispo planning pending PT/OT eval and remainder of hospital course.   -Discuss with attending physician, will update if changes to the above.     Nimo Devries PA-C  Orthopedic Surgery  Team Pager: #[phone]

## 2023-04-14 NOTE — PHYSICAL THERAPY INITIAL EVALUATION ADULT - PERTINENT HX OF CURRENT PROBLEM, REHAB EVAL
89yFemale w/ PMH of HTN and HLD c/o L hip pain s/p mechanical fall. Pt was attempting to reach to pick something off the floor when she lost her balance and fell onto her left side. Unable to bear weight in the LLE since the fall. Denies headstrike or LOC. Denies numbness/tingling in the LLE. Denies any other trauma/injuries at this time. At baseline, community ambulator w/ cane. Now s/p left femoral neck fx, hip hemiarthroplasty 4/14.

## 2023-04-14 NOTE — PROGRESS NOTE ADULT - ASSESSMENT
89 y/o FM with left femoral neck fracture for surgical intervention today, NPO  Lilibeth Lynn PA-C  Orthopaedic Surgery  Team pager [phone]  [facility] [phone]  [phone]

## 2023-04-14 NOTE — DISCHARGE NOTE PROVIDER - NSDCFUADDINST_GEN_ALL_CORE_FT
Weight bearing status: WBAT with operative leg, POSTERIOR HIP PRECAUTIONS  DVT ppx: Lovenox x 6 weeks post op  Continue pain medications as prescribed.   Bandage instructions: Keep Aquacel clean dry and intact. Remove Aquacel and staples if applicable on POD #14, replace with steri strips to the incision.   FU with Dr. Lafleur in 2-3 weeks post hospital discharge.

## 2023-04-14 NOTE — DISCHARGE NOTE PROVIDER - NSDCCPCAREPLAN_GEN_ALL_CORE_FT
PRINCIPAL DISCHARGE DIAGNOSIS  Diagnosis: Fracture of femoral neck, left, closed  Assessment and Plan of Treatment:

## 2023-04-14 NOTE — PATIENT PROFILE ADULT - FALL HARM RISK - HARM RISK INTERVENTIONS

## 2023-04-14 NOTE — PRE-ANESTHESIA EVALUATION ADULT - NSANTHPMHFT_GEN_ALL_CORE
chart and consultant notes reviewed. no hx sig cad/chf - states et 1 block, no cp. ekg sr. pt states is followed for regurgitant valve. no signs active cad/chf. elevated cr, no baseline

## 2023-04-14 NOTE — ED ADULT NURSE NOTE - OBJECTIVE STATEMENT
89-year-old female chief complaint left hip pain.  History of hypertension hyperlipidemia. Patient noted a fall this evening. Patient states she lost her balance and fell onto her left side. Patient is normally walking at baseline but has not been able to walk since the fall

## 2023-04-14 NOTE — PHYSICAL THERAPY INITIAL EVALUATION ADULT - GAIT DEVIATIONS NOTED, PT EVAL
decreased doreen/increased time in double stance/decreased step length/decreased stride length/decreased weight-shifting ability

## 2023-04-14 NOTE — DISCHARGE NOTE PROVIDER - CARE PROVIDER_API CALL
Sid Lafleur)  Orthopedics  [address] [place] [address]  Phone: [phone]  Fax: [phone]  Follow Up Time: 2 weeks

## 2023-04-14 NOTE — OCCUPATIONAL THERAPY INITIAL EVALUATION ADULT - ADL RETRAINING, OT EVAL
[facility]    Progress Note    Puneet Sauceda Patient Status:  Inpatient    10/22/1931 MRN [id number]   Location [facility] 3W/SW Attending Deshaun Garcia DO   Hosp Day # 3 PCP Katherin Rhodes MD       SUBJECTIVE:  Doing well to left base consolidation which was noted on previous multiple CT scans. Correlate clinically and with possible followup CT scan and/or bronchoscopy. Endobronchial lesion with resultant atelectasis should be excluded. Mild bibasilar scarring/atelectasis. with [facility]  Decrease prednisone to 7.5mg daily (consider tapering)     Pulmonary HTN  seen on ECHO with PA pressure 54 (previous PA pressure 44 inJune 2016); thought to be multifactorial -- COPD      Pulmonary nodule, RLL   12 mm in size by Pt will complete toileting with CGA within 4 weeks

## 2023-04-14 NOTE — DISCHARGE NOTE PROVIDER - NSDCFUADDAPPT_GEN_ALL_CORE_FT
Please follow up with your primary care physician regarding your hospitalization within one month of your discharge.

## 2023-04-14 NOTE — CONSULT NOTE ADULT - ASSESSMENT
89 yo woman s/p a fall at home presents with a left hip fracture. She is scheduled for an Open reduction and internal fixation of the left hip.

## 2023-04-14 NOTE — PHYSICAL THERAPY INITIAL EVALUATION ADULT - GENERAL OBSERVATIONS, REHAB EVAL
Pt rec'd semisupine in bed, in NAD, +IV, +abduction pillow, +external female catheter. Agreeable to PT. RN cleared pt to be seen.

## 2023-04-14 NOTE — PHYSICAL THERAPY INITIAL EVALUATION ADULT - ADDITIONAL COMMENTS
Per pt, she lives in a house with son. Has 3 steps to enter (+HR) and first floor setup. Reports being independent with functional mobility/ADLs with RW/cane (uses cane more often). Reports son assists with stair negotiation. Own rolling walker, cane, WC for longer distances, and tub bench.

## 2023-04-14 NOTE — DISCHARGE NOTE PROVIDER - HOSPITAL COURSE
History of Present Illness:  89-year-old female chief complaint left hip pain.  History of hypertension hyperlipidemia. Patient noted a fall this evening. Patient states she dropped something and  lost her balance while trying to pick it up. She  fell onto her left side and could not get up. . Patient is normally walking at baseline but has not been able to walk since the fall. Patient was brought to [facility] for further evaluation and treatment. In the ED she was found to have a left hip fracture. Patient is scheduled for an Open reduction and internal fixation of the left hip. She is seen now resting comfortably.    PAST MEDICAL & SURGICAL HISTORY:  Hypertension  HLD  LUISA    Home medications:   1. Brimonidine 0.2% 1 gtt each eye BID  2. Hydralazine 100mg PO morning, evening, bedtime  3. Carvedilol 25mg PO BID  4. Simvastatin 10mg PO Bedtime  5. Timolol 0.5% 1 gtt each eye BID  6. Torsemide 20mg PO BID  7. Amlodipine 10mg PO Daily    Hospital Course:  After admission on 4/14/23 and following medical clearance and optimization for surgical intervention, the patient underwent an uncomplicated Left hip hemiarthroplasty with Dr. Lafleur for surgical fixation of a left femoral neck fracture.  Patient tolerated the procedure well and was transferred to the recovery room in stable condition, with a stable neuro / vascular exam of the operated extremity.    Patient was placed on Lovenox for DVT ppx, and was placed on Protonix for GI protection.   Patient was made weight bearing as tolerated with the operative leg. Patient placed on POSTERIOR HIP PRECAUTIONS    Patient evaluated by PT/OT and recommended for disposition for XXXXX    Discharge and Orthopedic Care instructions were delineated in the Discharge Plan and reviewed with the patient. All medications were delineated in the medication reconciliation tool and key points were reviewed with the patient. They were deemed stable from an Orthopedic & medical standpoint for discharge *** History of Present Illness:  89-year-old female chief complaint left hip pain.  History of hypertension hyperlipidemia. Patient noted a fall this evening. Patient states she dropped something and  lost her balance while trying to pick it up. She  fell onto her left side and could not get up. . Patient is normally walking at baseline but has not been able to walk since the fall. Patient was brought to [facility] for further evaluation and treatment. In the ED she was found to have a left hip fracture. Patient is scheduled for an Open reduction and internal fixation of the left hip. She is seen now resting comfortably.    PAST MEDICAL & SURGICAL HISTORY:  Hypertension  HLD  LUISA    Home medications:   1. Brimonidine 0.2% 1 gtt each eye BID  2. Hydralazine 100mg PO morning, evening, bedtime  3. Carvedilol 25mg PO BID  4. Simvastatin 10mg PO Bedtime  5. Timolol 0.5% 1 gtt each eye BID  6. Torsemide 20mg PO BID  7. Amlodipine 10mg PO Daily    Hospital Course:  After admission on 4/14/23 and following medical clearance and optimization for surgical intervention, the patient underwent an uncomplicated Left hip hemiarthroplasty with Dr. Lafleur for surgical fixation of a left femoral neck fracture.  Patient tolerated the procedure well and was transferred to the recovery room in stable condition, with a stable neuro / vascular exam of the operated extremity.    Patient was placed on Lovenox for DVT ppx, and was placed on Protonix for GI protection.   Patient was made weight bearing as tolerated with the operative leg. Patient placed on POSTERIOR HIP PRECAUTIONS    Patient evaluated by PT/OT and recommended for disposition for subacute rehab.     4/16: Renal consulted for KAYLA on CKD, recommendations followed [id number]    Discharge and Orthopedic Care instructions were delineated in the Discharge Plan and reviewed with the patient. All medications were delineated in the medication reconciliation tool and key points were reviewed with the patient. They were deemed stable from an Orthopedic & medical standpoint for discharge XXXX History of Present Illness:  89-year-old female chief complaint left hip pain.  History of hypertension hyperlipidemia. Patient noted a fall this evening. Patient states she dropped something and  lost her balance while trying to pick it up. She  fell onto her left side and could not get up. . Patient is normally walking at baseline but has not been able to walk since the fall. Patient was brought to [facility] for further evaluation and treatment. In the ED she was found to have a left hip fracture. Patient is scheduled for an Open reduction and internal fixation of the left hip. She is seen now resting comfortably.    PAST MEDICAL & SURGICAL HISTORY:  Hypertension  HLD  LUISA    Home medications:   1. Brimonidine 0.2% 1 gtt each eye BID  2. Hydralazine 100mg PO morning, evening, bedtime  3. Carvedilol 25mg PO BID  4. Simvastatin 10mg PO Bedtime  5. Timolol 0.5% 1 gtt each eye BID  6. Torsemide 20mg PO BID  7. Amlodipine 10mg PO Daily    Hospital Course:  After admission on 4/14/23 and following medical clearance and optimization for surgical intervention, the patient underwent an uncomplicated Left hip hemiarthroplasty with Dr. Lafleur for surgical fixation of a left femoral neck fracture.  Patient tolerated the procedure well and was transferred to the recovery room in stable condition, with a stable neuro / vascular exam of the operated extremity.    Patient was placed on Lovenox for DVT ppx, and was placed on Protonix for GI protection.   Patient was made weight bearing as tolerated with the operative leg. Patient placed on POSTERIOR HIP PRECAUTIONS    Patient evaluated by PT/OT and recommended for disposition for subacute rehab.     4/16: Renal consulted for KAYLA on CKD, recommendations followed. KAYLA on baseline CKDIV. baseline creatinine 1.5 range. Torsemide held. Urine culture negative. Patient voiding without complications.   iron deficiency, ferous sulfate 325 mg daily added. Norvasc, and hydralyzine continued for hypertension, and coreg continued for hypertension/chf. Renal US completed  - within normal limits without hydronephrosis.   4/19: BUN/cr downtrending appropriately. Patient cleared for discharge by nephrology team, with instructions to restart [place] on 4/21.     Discharge and Orthopedic Care instructions were delineated in the Discharge Plan and reviewed with the patient. All medications were delineated in the medication reconciliation tool and key points were reviewed with the patient. They were deemed stable from an Orthopedic & medical standpoint for discharge on 4/19/23

## 2023-04-14 NOTE — PHYSICAL THERAPY INITIAL EVALUATION ADULT - ACTIVE RANGE OF MOTION EXAMINATION, REHAB EVAL
L hip flx limited to 90 degrees 2/2 posterior hip precautions/bilateral upper extremity Active ROM was WFL (within functional limits)/bilateral  lower extremity Active ROM was WFL (within functional limits)

## 2023-04-14 NOTE — DISCHARGE NOTE PROVIDER - NSDCMRMEDTOKEN_GEN_ALL_CORE_FT
acetaminophen 325 mg oral tablet: 3 tab(s) orally every 8 hours  amLODIPine 10 mg oral tablet: 1 orally once a day  bisacodyl 10 mg rectal suppository: 1 suppository(ies) rectal once a day As needed If no bowel movement  brimonidine 0.2% ophthalmic solution: 1 in each affected eye 2 times a day to each eye  calcium-vitamin D 500 mg-5 mcg (200 intl units) oral tablet: 1 tab(s) orally once a day  cholecalciferol oral tablet: 2000 unit(s) orally once a day  Coreg 25 mg oral tablet: 1 orally 2 times a day  hydrALAZINE 100 mg oral tablet: 1 orally 3 times a day  magnesium hydroxide 8% oral suspension: 30 milliliter(s) orally once a day As needed Constipation  melatonin 3 mg oral tablet: 1 tab(s) orally once a day (at bedtime) As needed Insomnia  pantoprazole 40 mg oral delayed release tablet: 1 tab(s) orally once a day (before a meal)  polyethylene glycol 3350 oral powder for reconstitution: 17 gram(s) orally once a day  senna leaf extract oral tablet: 2 tab(s) orally once a day (at bedtime)  simvastatin 10 mg oral tablet: 1 orally once a day (at bedtime)  timolol maleate 0.5% ophthalmic solution: 1 in each affected eye 2 times a day Each eye  torsemide 20 mg oral tablet: 1 orally 2 times a day  traMADol 50 mg oral tablet: 1 tab(s) orally every 6 hours As needed Severe Pain (7 - 10)  traMADol 50 mg oral tablet: 0.5 tab(s) orally every 6 hours As needed Moderate Pain (4 - 6)   acetaminophen 325 mg oral tablet: 3 tab(s) orally every 8 hours  amLODIPine 10 mg oral tablet: 1 orally once a day  bisacodyl 10 mg rectal suppository: 1 suppository(ies) rectal once a day As needed If no bowel movement  brimonidine 0.2% ophthalmic solution: 1 in each affected eye 2 times a day to each eye  calcium-vitamin D 500 mg-5 mcg (200 intl units) oral tablet: 1 tab(s) orally once a day  cholecalciferol oral tablet: 2000 unit(s) orally once a day  Coreg 25 mg oral tablet: 1 orally 2 times a day  enoxaparin: 30 milligram(s) subcutaneous once a day x 6 weeks post operatively for DVT ppx  hydrALAZINE 100 mg oral tablet: 1 orally 3 times a day  magnesium hydroxide 8% oral suspension: 30 milliliter(s) orally once a day As needed Constipation  melatonin 3 mg oral tablet: 1 tab(s) orally once a day (at bedtime) As needed Insomnia  pantoprazole 40 mg oral delayed release tablet: 1 tab(s) orally once a day (before a meal)  polyethylene glycol 3350 oral powder for reconstitution: 17 gram(s) orally once a day  senna leaf extract oral tablet: 2 tab(s) orally once a day (at bedtime)  simvastatin 10 mg oral tablet: 1 orally once a day (at bedtime)  timolol maleate 0.5% ophthalmic solution: 1 in each affected eye 2 times a day Each eye  torsemide 20 mg oral tablet: 1 orally 2 times a day  traMADol 50 mg oral tablet: 1 tab(s) orally every 6 hours As needed Severe Pain (7 - 10)  traMADol 50 mg oral tablet: 0.5 tab(s) orally every 6 hours As needed Moderate Pain (4 - 6)   acetaminophen 325 mg oral tablet: 3 tab(s) orally every 8 hours  amLODIPine 10 mg oral tablet: 1 orally once a day  apixaban 2.5 mg oral tablet: 1 tab(s) orally 2 times a day x 6 weeks post operative  for DVT ppx  bisacodyl 10 mg rectal suppository: 1 suppository(ies) rectal once a day As needed If no bowel movement  brimonidine 0.2% ophthalmic solution: 1 in each affected eye 2 times a day to each eye  calcium-vitamin D 500 mg-5 mcg (200 intl units) oral tablet: 1 tab(s) orally once a day  cholecalciferol oral tablet: 2000 unit(s) orally once a day  Coreg 25 mg oral tablet: 1 orally 2 times a day  ferrous sulfate 325 mg (65 mg elemental iron) oral tablet: 1 tab(s) orally once a day  hydrALAZINE 100 mg oral tablet: 1 orally 3 times a day  magnesium hydroxide 8% oral suspension: 30 milliliter(s) orally once a day As needed Constipation  melatonin 3 mg oral tablet: 1 tab(s) orally once a day (at bedtime) As needed Insomnia  pantoprazole 40 mg oral delayed release tablet: 1 tab(s) orally once a day (before a meal)  polyethylene glycol 3350 oral powder for reconstitution: 17 gram(s) orally once a day  senna leaf extract oral tablet: 2 tab(s) orally once a day (at bedtime)  simvastatin 10 mg oral tablet: 1 orally once a day (at bedtime)  timolol maleate 0.5% ophthalmic solution: 1 in each affected eye 2 times a day Each eye  torsemide 20 mg oral tablet: 1 tablet orally 2 times a day TO BE RESTARTED  ON 4/21/23 as per neprhology. (Hold 4/19 and 4/20)  traMADol 50 mg oral tablet: 1 tab(s) orally every 6 hours As needed Severe Pain (7 - 10)  traMADol 50 mg oral tablet: 0.5 tab(s) orally every 6 hours As needed Moderate Pain (4 - 6)

## 2023-04-15 DIAGNOSIS — I50.9 HEART FAILURE, UNSPECIFIED: ICD-10-CM

## 2023-04-15 LAB
ANION GAP SERPL CALC-SCNC: 15 MMOL/L — SIGNIFICANT CHANGE UP (ref 5–17)
BUN SERPL-MCNC: 32 MG/DL — HIGH (ref 7–23)
CALCIUM SERPL-MCNC: 9.2 MG/DL — SIGNIFICANT CHANGE UP (ref 8.4–10.5)
CHLORIDE SERPL-SCNC: 105 MMOL/L — SIGNIFICANT CHANGE UP (ref 96–108)
CO2 SERPL-SCNC: 21 MMOL/L — LOW (ref 22–31)
CREAT SERPL-MCNC: 1.61 MG/DL — HIGH (ref 0.5–1.3)
EGFR: 29 ML/MIN/1.73M2 — LOW
GLUCOSE SERPL-MCNC: 127 MG/DL — HIGH (ref 70–99)
HCT VFR BLD CALC: 29.7 % — LOW (ref 34.5–45)
HGB BLD-MCNC: 9.4 G/DL — LOW (ref 11.5–15.5)
MCHC RBC-ENTMCNC: 30.5 PG — SIGNIFICANT CHANGE UP (ref 27–34)
MCHC RBC-ENTMCNC: 31.6 GM/DL — LOW (ref 32–36)
MCV RBC AUTO: 96.4 FL — SIGNIFICANT CHANGE UP (ref 80–100)
NRBC # BLD: 0 /100 WBCS — SIGNIFICANT CHANGE UP (ref 0–0)
PLATELET # BLD AUTO: 148 K/UL — LOW (ref 150–400)
POTASSIUM SERPL-MCNC: 4.5 MMOL/L — SIGNIFICANT CHANGE UP (ref 3.5–5.3)
POTASSIUM SERPL-SCNC: 4.5 MMOL/L — SIGNIFICANT CHANGE UP (ref 3.5–5.3)
RBC # BLD: 3.08 M/UL — LOW (ref 3.8–5.2)
RBC # FLD: 13.2 % — SIGNIFICANT CHANGE UP (ref 10.3–14.5)
SODIUM SERPL-SCNC: 141 MMOL/L — SIGNIFICANT CHANGE UP (ref 135–145)
WBC # BLD: 10.45 K/UL — SIGNIFICANT CHANGE UP (ref 3.8–10.5)
WBC # FLD AUTO: 10.45 K/UL — SIGNIFICANT CHANGE UP (ref 3.8–10.5)

## 2023-04-15 RX ADMIN — Medication 1 DROP(S): at 17:46

## 2023-04-15 RX ADMIN — AMLODIPINE BESYLATE 10 MILLIGRAM(S): 2.5 TABLET ORAL at 05:30

## 2023-04-15 RX ADMIN — Medication 975 MILLIGRAM(S): at 05:28

## 2023-04-15 RX ADMIN — Medication 100 MILLIGRAM(S): at 04:19

## 2023-04-15 RX ADMIN — CARVEDILOL PHOSPHATE 25 MILLIGRAM(S): 80 CAPSULE, EXTENDED RELEASE ORAL at 17:45

## 2023-04-15 RX ADMIN — CARVEDILOL PHOSPHATE 25 MILLIGRAM(S): 80 CAPSULE, EXTENDED RELEASE ORAL at 05:29

## 2023-04-15 RX ADMIN — Medication 975 MILLIGRAM(S): at 14:07

## 2023-04-15 RX ADMIN — Medication 100 MILLIGRAM(S): at 05:29

## 2023-04-15 RX ADMIN — Medication 975 MILLIGRAM(S): at 06:28

## 2023-04-15 RX ADMIN — SIMVASTATIN 10 MILLIGRAM(S): 20 TABLET, FILM COATED ORAL at 21:41

## 2023-04-15 RX ADMIN — Medication 20 MILLIGRAM(S): at 17:45

## 2023-04-15 RX ADMIN — Medication 100 MILLIGRAM(S): at 17:45

## 2023-04-15 RX ADMIN — PANTOPRAZOLE SODIUM 40 MILLIGRAM(S): 20 TABLET, DELAYED RELEASE ORAL at 05:28

## 2023-04-15 RX ADMIN — Medication 975 MILLIGRAM(S): at 22:41

## 2023-04-15 RX ADMIN — BRIMONIDINE TARTRATE 1 DROP(S): 2 SOLUTION/ DROPS OPHTHALMIC at 17:46

## 2023-04-15 RX ADMIN — Medication 1 DROP(S): at 05:29

## 2023-04-15 RX ADMIN — Medication 20 MILLIGRAM(S): at 05:30

## 2023-04-15 RX ADMIN — Medication 2000 UNIT(S): at 11:30

## 2023-04-15 RX ADMIN — Medication 100 MILLIGRAM(S): at 21:41

## 2023-04-15 RX ADMIN — ENOXAPARIN SODIUM 30 MILLIGRAM(S): 100 INJECTION SUBCUTANEOUS at 13:07

## 2023-04-15 RX ADMIN — Medication 975 MILLIGRAM(S): at 13:07

## 2023-04-15 RX ADMIN — Medication 975 MILLIGRAM(S): at 21:41

## 2023-04-15 RX ADMIN — Medication 1 TABLET(S): at 11:31

## 2023-04-15 RX ADMIN — POLYETHYLENE GLYCOL 3350 17 GRAM(S): 17 POWDER, FOR SOLUTION ORAL at 11:30

## 2023-04-15 RX ADMIN — BRIMONIDINE TARTRATE 1 DROP(S): 2 SOLUTION/ DROPS OPHTHALMIC at 05:29

## 2023-04-15 RX ADMIN — SENNA PLUS 2 TABLET(S): 8.6 TABLET ORAL at 21:41

## 2023-04-15 NOTE — PROGRESS NOTE ADULT - ASSESSMENT
89 yo woman s/p a fall at home presents with a left hip fracture. She is S/P an Open reduction and internal fixation of the left hip.

## 2023-04-15 NOTE — PROGRESS NOTE ADULT - ASSESSMENT
A/p: 89y Female s/p Left hemiarthroplasty POD#1.  VSS. NAD.  - PT/OT - WBAT  - Ice/elevation  - DVT PPx: Lovenox 30mg daily  - Pain Control  - Continue Current Tx  - Appreciate medicine recs  - Dispo: anticipating subacute rehab    Gustavo Hernandez PA-C  Orthopedic Surgery  Team Pager: #[phone] A/p: 89y Female s/p Left hemiarthroplasty POD#1.  VSS. NAD.  - PT/OT - WBAT with posterior hip precautions  - Ice/elevation  - DVT PPx: Lovenox 30mg daily  - Pain Control  - Continue Current Tx  - Appreciate medicine recs  - Dispo: anticipating subacute rehab    Gustavo Hernandez PA-C  Orthopedic Surgery  Team Pager: #[phone]

## 2023-04-16 DIAGNOSIS — N17.9 ACUTE KIDNEY FAILURE, UNSPECIFIED: ICD-10-CM

## 2023-04-16 LAB
ANION GAP SERPL CALC-SCNC: 14 MMOL/L — SIGNIFICANT CHANGE UP (ref 5–17)
APPEARANCE UR: ABNORMAL
BILIRUB UR-MCNC: NEGATIVE — SIGNIFICANT CHANGE UP
BUN SERPL-MCNC: 47 MG/DL — HIGH (ref 7–23)
CALCIUM SERPL-MCNC: 9.4 MG/DL — SIGNIFICANT CHANGE UP (ref 8.4–10.5)
CHLORIDE SERPL-SCNC: 104 MMOL/L — SIGNIFICANT CHANGE UP (ref 96–108)
CO2 SERPL-SCNC: 21 MMOL/L — LOW (ref 22–31)
COLOR SPEC: SIGNIFICANT CHANGE UP
CREAT SERPL-MCNC: 2.03 MG/DL — HIGH (ref 0.5–1.3)
DIFF PNL FLD: NEGATIVE — SIGNIFICANT CHANGE UP
EGFR: 22 ML/MIN/1.73M2 — LOW
GLUCOSE SERPL-MCNC: 93 MG/DL — SIGNIFICANT CHANGE UP (ref 70–99)
GLUCOSE UR QL: NEGATIVE — SIGNIFICANT CHANGE UP
HCT VFR BLD CALC: 28.1 % — LOW (ref 34.5–45)
HGB BLD-MCNC: 9.1 G/DL — LOW (ref 11.5–15.5)
KETONES UR-MCNC: NEGATIVE — SIGNIFICANT CHANGE UP
LEUKOCYTE ESTERASE UR-ACNC: ABNORMAL
MCHC RBC-ENTMCNC: 31 PG — SIGNIFICANT CHANGE UP (ref 27–34)
MCHC RBC-ENTMCNC: 32.4 GM/DL — SIGNIFICANT CHANGE UP (ref 32–36)
MCV RBC AUTO: 95.6 FL — SIGNIFICANT CHANGE UP (ref 80–100)
NITRITE UR-MCNC: NEGATIVE — SIGNIFICANT CHANGE UP
NRBC # BLD: 0 /100 WBCS — SIGNIFICANT CHANGE UP (ref 0–0)
PH UR: 6 — SIGNIFICANT CHANGE UP (ref 5–8)
PLATELET # BLD AUTO: 138 K/UL — LOW (ref 150–400)
POTASSIUM SERPL-MCNC: 4.3 MMOL/L — SIGNIFICANT CHANGE UP (ref 3.5–5.3)
POTASSIUM SERPL-SCNC: 4.3 MMOL/L — SIGNIFICANT CHANGE UP (ref 3.5–5.3)
PROT UR-MCNC: NEGATIVE — SIGNIFICANT CHANGE UP
RBC # BLD: 2.94 M/UL — LOW (ref 3.8–5.2)
RBC # FLD: 13.3 % — SIGNIFICANT CHANGE UP (ref 10.3–14.5)
SODIUM SERPL-SCNC: 139 MMOL/L — SIGNIFICANT CHANGE UP (ref 135–145)
SP GR SPEC: 1.01 — SIGNIFICANT CHANGE UP (ref 1.01–1.02)
UROBILINOGEN FLD QL: NEGATIVE — SIGNIFICANT CHANGE UP
WBC # BLD: 11.19 K/UL — HIGH (ref 3.8–10.5)
WBC # FLD AUTO: 11.19 K/UL — HIGH (ref 3.8–10.5)

## 2023-04-16 RX ADMIN — CARVEDILOL PHOSPHATE 25 MILLIGRAM(S): 80 CAPSULE, EXTENDED RELEASE ORAL at 05:49

## 2023-04-16 RX ADMIN — TRAMADOL HYDROCHLORIDE 25 MILLIGRAM(S): 50 TABLET ORAL at 12:38

## 2023-04-16 RX ADMIN — Medication 975 MILLIGRAM(S): at 21:35

## 2023-04-16 RX ADMIN — BRIMONIDINE TARTRATE 1 DROP(S): 2 SOLUTION/ DROPS OPHTHALMIC at 17:43

## 2023-04-16 RX ADMIN — AMLODIPINE BESYLATE 10 MILLIGRAM(S): 2.5 TABLET ORAL at 05:48

## 2023-04-16 RX ADMIN — Medication 1 TABLET(S): at 11:25

## 2023-04-16 RX ADMIN — ENOXAPARIN SODIUM 30 MILLIGRAM(S): 100 INJECTION SUBCUTANEOUS at 13:44

## 2023-04-16 RX ADMIN — POLYETHYLENE GLYCOL 3350 17 GRAM(S): 17 POWDER, FOR SOLUTION ORAL at 11:26

## 2023-04-16 RX ADMIN — Medication 975 MILLIGRAM(S): at 22:30

## 2023-04-16 RX ADMIN — Medication 975 MILLIGRAM(S): at 13:44

## 2023-04-16 RX ADMIN — Medication 20 MILLIGRAM(S): at 05:48

## 2023-04-16 RX ADMIN — Medication 100 MILLIGRAM(S): at 05:49

## 2023-04-16 RX ADMIN — BRIMONIDINE TARTRATE 1 DROP(S): 2 SOLUTION/ DROPS OPHTHALMIC at 05:47

## 2023-04-16 RX ADMIN — Medication 20 MILLIGRAM(S): at 17:41

## 2023-04-16 RX ADMIN — Medication 2000 UNIT(S): at 11:26

## 2023-04-16 RX ADMIN — Medication 975 MILLIGRAM(S): at 06:40

## 2023-04-16 RX ADMIN — Medication 1 DROP(S): at 17:43

## 2023-04-16 RX ADMIN — Medication 100 MILLIGRAM(S): at 17:41

## 2023-04-16 RX ADMIN — PANTOPRAZOLE SODIUM 40 MILLIGRAM(S): 20 TABLET, DELAYED RELEASE ORAL at 05:49

## 2023-04-16 RX ADMIN — CARVEDILOL PHOSPHATE 25 MILLIGRAM(S): 80 CAPSULE, EXTENDED RELEASE ORAL at 17:41

## 2023-04-16 RX ADMIN — Medication 100 MILLIGRAM(S): at 21:35

## 2023-04-16 RX ADMIN — Medication 975 MILLIGRAM(S): at 05:48

## 2023-04-16 RX ADMIN — SENNA PLUS 2 TABLET(S): 8.6 TABLET ORAL at 21:35

## 2023-04-16 RX ADMIN — SIMVASTATIN 10 MILLIGRAM(S): 20 TABLET, FILM COATED ORAL at 21:35

## 2023-04-16 RX ADMIN — Medication 1 DROP(S): at 05:46

## 2023-04-16 NOTE — CONSULT NOTE ADULT - ASSESSMENT
89yFemale w/ PMH of HTN and HLD c/o L hip pain s/p mechanical fall. Pt was attempting to reach to pick something off the floor when she lost her balance and fell onto her left side. dx with m L displaced transcervical femoral neck fracture on 4/13. pt is now s/p hip hemiarthroplasty. noticed with abn creatinine which was 1.8 on admission now increased to 2       1- CKD IV   2- HTN   3- anemia     high cr likely her baseline range  urinalysis ordered  and given high urinary leukocytosis suspect UTI   to have repeat urinalysis and urine culture   check retic ct and iron profile   chf hx cont torsemide 20 mg daily will decrease dose if cr cont to rise  norvasc 10 mg daily along with hydralazine 100 mg tid and coreg 25 mg bid   d/w ortho team when seen earlier

## 2023-04-16 NOTE — PROGRESS NOTE ADULT - ASSESSMENT
A/p: 89y Female s/p Left hemiarthroplasty POD#2.  VSS. NAD.  - FU AM labs - monitor H/h  - PT/OT - WBAT with posterior hip precautions. OOB  - Ice/elevation  - DVT PPx: Lovenox 30mg daily  - Pain Control  - Continue Current Tx  - Appreciate medicine recs  - Dispo: anticipating subacute rehab    Nimo Devries PA-C  Orthopedic Surgery  Team Pager: #[phone]

## 2023-04-16 NOTE — CONSULT NOTE ADULT - SUBJECTIVE AND OBJECTIVE BOX
[facility]  [phone]  NEPHROLOGY      INITIAL CONSULT NOTE  --------------------------------------------------------------------------------  HPI:    89yFemale w/ PMH of HTN and HLD c/o L hip pain s/p mechanical fall. Pt was attempting to reach to pick something off the floor when she lost her balance and fell onto her left side. dx with m L displaced transcervical femoral neck fracture on 4/13. pt is now s/p hip hemiarthroplasty. noticed with abn creatinine which was 1.8 on admission now increased to 2 renal consult called      PAST HISTORY  --------------------------------------------------------------------------------  PAST MEDICAL & SURGICAL HISTORY:  Hypertension      No significant past surgical history        FAMILY HISTORY:  No pertinent family history in first degree relatives      PAST SOCIAL HISTORY:  denies tobacco use     ALLERGIES & MEDICATIONS  --------------------------------------------------------------------------------  Allergies    No Known Allergies    Intolerances      Standing Inpatient Medications  acetaminophen     Tablet .. 975 milliGRAM(s) Oral every 8 hours  amLODIPine   Tablet 10 milliGRAM(s) Oral daily  brimonidine 0.2% Ophthalmic Solution 1 Drop(s) Both EYES two times a day  calcium carbonate 1250 mG  + Vitamin D (OsCal 500 + D) 1 Tablet(s) Oral daily  carvedilol 25 milliGRAM(s) Oral every 12 hours  cholecalciferol 2000 Unit(s) Oral daily  enoxaparin Injectable 30 milliGRAM(s) SubCutaneous every 24 hours  hydrALAZINE 100 milliGRAM(s) Oral <User Schedule>  pantoprazole    Tablet 40 milliGRAM(s) Oral before breakfast  polyethylene glycol 3350 17 Gram(s) Oral daily  senna 2 Tablet(s) Oral at bedtime  simvastatin 10 milliGRAM(s) Oral at bedtime  sodium chloride 0.9%. 1000 milliLiter(s) IV Continuous <Continuous>  timolol 0.5% Solution 1 Drop(s) Both EYES every 12 hours  torsemide 20 milliGRAM(s) Oral daily    PRN Inpatient Medications  bisacodyl Suppository 10 milliGRAM(s) Rectal daily PRN  magnesium hydroxide Suspension 30 milliLiter(s) Oral daily PRN  melatonin 3 milliGRAM(s) Oral at bedtime PRN  ondansetron Injectable 4 milliGRAM(s) IV Push every 6 hours PRN  traMADol 50 milliGRAM(s) Oral every 6 hours PRN  traMADol 25 milliGRAM(s) Oral every 6 hours PRN      REVIEW OF SYSTEMS  --------------------------------------------------------------------------------  Gen: No  fevers/chills   Skin: No rashes  Head/Eyes/Ears/Mouth: No headache; Normal hearing;  No sinus pain/discomfort, sore throat  Respiratory: No dyspnea, cough, wheezing, hemoptysis  CV: No chest pain, orthopnea  GI: No abdominal pain, diarrhea, nausea, vomiting, melena, hematochezia  : No dysuria,   hematuria, nocturia  MSK: + hip joint pain.  no back pain  Neuro: No dizziness/lightheadednes  also with no edema         VITALS/PHYSICAL EXAM  --------------------------------------------------------------------------------  T(C): 36.6 (04-16-23 @ 20:08), Max: 36.6 (04-16-23 @ 00:00)  HR: 68 (04-16-23 @ 20:08) (64 - 78)  BP: 119/64 (04-16-23 @ 20:08) (103/60 - 124/56)  RR: 18 (04-16-23 @ 20:08) (18 - 18)  SpO2: 93% (04-16-23 @ 20:08) (93% - 95%)  Wt(kg): --        04-15-23 @ 07:01  -  04-16-23 @ 07:00  --------------------------------------------------------  IN: 960 mL / OUT: 1750 mL / NET: -790 mL    04-16-23 @ 07:01  -  04-16-23 @ 23:10  --------------------------------------------------------  IN: 580 mL / OUT: 1350 mL / NET: -770 mL      Physical Exam:  	Gen: Non toxic comfortable appearing  Mentasta   	no jvd   	Pulm: decrease bs  no rales or ronchi or wheezing  	CV: RRR, S1S2; no rub  	Abd: +BS, soft, nontender/nondistended  	: No suprapubic tenderness  	UE: Warm, no cyanosis  no clubbing,  no edema  	LE: Warm, no cyanosis  no clubbing, no edema  	Neuro: alert and oriented. speech coherent   	Psych: Normal affect and mood  	Skin: Warm, no decrease skin turgor   	      LABS/STUDIES  --------------------------------------------------------------------------------              9.1    11.19 >-----------<  138      [04-16-23 @ 07:38]              28.1     139  |  104  |  47  ----------------------------<  93      [04-16-23 @ 07:37]  4.3   |  21  |  2.03        Ca     9.4     [04-16-23 @ 07:37]            Creatinine Trend:  SCr 2.03 [04-16 @ 07:37]  SCr 1.61 [04-15 @ 06:55]  SCr 1.53 [04-14 @ 14:10]  SCr 1.62 [04-14 @ 01:22]  SCr 1.81 [04-13 @ 21:21]    Urinalysis - [04-16-23 @ 14:29]      Color Light Yellow / Appearance Slightly Turbid / SG 1.011 / pH 6.0      Gluc Negative / Ketone Negative  / Bili Negative / Urobili Negative       Blood Negative / Protein Negative / Leuk Est Large / Nitrite Negative      RBC 3 /  / Hyaline 0 / Gran  / Sq Epi  / Non Sq Epi  / Bacteria Negative          
89-year-old female chief complaint left hip pain.  History of hypertension hyperlipidemia. Patient noted a fall this evening. Patient states she dropped something and  lost her balance while trying to pick it up. She  fell onto her left side and could not get up. . Patient is normally walking at baseline but has not been able to walk since the fall. Patient was brought to [facility] for further evaluation and treatment. In the ED she was found to have a left hip fracture. Patient is scheduled for an Open reduction and internal fixation of the left hip. She is seen now resting comfortably.        PAST MEDICAL & SURGICAL HISTORY:  Hypertension    HLD      LUISA        MEDICATIONS  (STANDING):  acetaminophen     Tablet .. 975 milliGRAM(s) Oral every 8 hours  sodium chloride 0.9%. 1000 milliLiter(s) (100 mL/Hr) IV Continuous <Continuous>    MEDICATIONS  (PRN):  oxyCODONE    IR 2.5 milliGRAM(s) Oral every 4 hours PRN Moderate Pain (4 - 6)  oxyCODONE    IR 5 milliGRAM(s) Oral every 4 hours PRN Severe Pain (7 - 10)        CONSTITUTIONAL: No weakness, fevers or chills  EYES/ENT: No visual changes;  No vertigo or throat pain   NECK: No pain or stiffness  RESPIRATORY: No cough, wheezing, hemoptysis; No shortness of breath  CARDIOVASCULAR: No chest pain or palpitations  GASTROINTESTINAL: No abdominal or epigastric pain. No nausea, vomiting, or hematemesis; No diarrhea or constipation. No melena or hematochezia.  GENITOURINARY: No dysuria, frequency or hematuria  NEUROLOGICAL: No numbness or weakness  SKIN: No itching, burning, rashes, or lesions   MUSCULOSKELETAL: left leg pain    INTERVAL HPI/OVERNIGHT EVENTS:  T(C): 36.5 (04-14-23 @ 08:47), Max: 37.3 (04-13-23 @ 20:19)  HR: 81 (04-14-23 @ 08:47) (64 - 90)  BP: 134/71 (04-14-23 @ 08:47) (128/72 - 151/77)  RR: 18 (04-14-23 @ 08:47) (15 - 18)  SpO2: 92% (04-14-23 @ 08:47) (92% - 97%)  Wt(kg): --  I&O's Summary      PHYSICAL EXAM:  GENERAL: NAD, well-groomed, well-developed  HEAD:  Atraumatic, Normocephalic  EYES: EOMI, PERRLA, conjunctiva and sclera clear  ENMT: No tonsillar erythema, exudates, or enlargement; Moist mucous membranes, Good dentition, No lesions  NECK: Supple, No JVD, Normal thyroid  NERVOUS SYSTEM:  Alert & Oriented X3, Good concentration; Motor Strength 5/5 B/L upper and lower extremities; DTRs 2+ intact and symmetric  CHEST/LUNG: Clear to percussion bilaterally; No rales, rhonchi, wheezing, or rubs  HEART: Regular rate and rhythm; No murmurs, rubs, or gallops  ABDOMEN: Soft, Nontender, Nondistended; Bowel sounds present  EXTREMITIES:  2+ Peripheral Pulses, No clubbing, cyanosis, or edema  LYMPH: No lymphadenopathy noted  SKIN: No rashes or lesions        LABS:                        10.5   10.97 )-----------( 180      ( 14 Apr 2023 01:22 )             32.9     04-14    139  |  103  |  33<H>  ----------------------------<  142<H>  4.3   |  21<L>  |  1.62<H>    Ca    10.0      14 Apr 2023 01:22    TPro  7.5  /  Alb  4.1  /  TBili  0.8  /  DBili  x   /  AST  23  /  ALT  15  /  AlkPhos  63  04-14    PT/INR - ( 14 Apr 2023 01:22 )   PT: 13.4 sec;   INR: 1.15 ratio         PTT - ( 14 Apr 2023 01:22 )  PTT:29.2 sec    EKG: Sinus rhythm @ 82 1st degree avb. LAD

## 2023-04-16 NOTE — PROGRESS NOTE ADULT - ASSESSMENT
89 yo woman s/p a fall at home presents with a left hip fracture. She is S/P an Open reduction and internal fixation of the left hip.

## 2023-04-17 LAB
ALBUMIN SERPL ELPH-MCNC: 3.4 G/DL — SIGNIFICANT CHANGE UP (ref 3.3–5)
ALP SERPL-CCNC: 56 U/L — SIGNIFICANT CHANGE UP (ref 40–120)
ALT FLD-CCNC: <5 U/L — LOW (ref 10–45)
ANION GAP SERPL CALC-SCNC: 12 MMOL/L — SIGNIFICANT CHANGE UP (ref 5–17)
ANION GAP SERPL CALC-SCNC: 12 MMOL/L — SIGNIFICANT CHANGE UP (ref 5–17)
AST SERPL-CCNC: 23 U/L — SIGNIFICANT CHANGE UP (ref 10–40)
BILIRUB SERPL-MCNC: 0.4 MG/DL — SIGNIFICANT CHANGE UP (ref 0.2–1.2)
BUN SERPL-MCNC: 52 MG/DL — HIGH (ref 7–23)
BUN SERPL-MCNC: 52 MG/DL — HIGH (ref 7–23)
CALCIUM SERPL-MCNC: 9.4 MG/DL — SIGNIFICANT CHANGE UP (ref 8.4–10.5)
CALCIUM SERPL-MCNC: 9.5 MG/DL — SIGNIFICANT CHANGE UP (ref 8.4–10.5)
CHLORIDE SERPL-SCNC: 104 MMOL/L — SIGNIFICANT CHANGE UP (ref 96–108)
CHLORIDE SERPL-SCNC: 104 MMOL/L — SIGNIFICANT CHANGE UP (ref 96–108)
CO2 SERPL-SCNC: 24 MMOL/L — SIGNIFICANT CHANGE UP (ref 22–31)
CO2 SERPL-SCNC: 24 MMOL/L — SIGNIFICANT CHANGE UP (ref 22–31)
CREAT SERPL-MCNC: 2.35 MG/DL — HIGH (ref 0.5–1.3)
CREAT SERPL-MCNC: 2.4 MG/DL — HIGH (ref 0.5–1.3)
EGFR: 18 ML/MIN/1.73M2 — LOW
EGFR: 18 ML/MIN/1.73M2 — LOW
FERRITIN SERPL-MCNC: 274 NG/ML — HIGH (ref 15–150)
GLUCOSE SERPL-MCNC: 101 MG/DL — HIGH (ref 70–99)
GLUCOSE SERPL-MCNC: 101 MG/DL — HIGH (ref 70–99)
HCT VFR BLD CALC: 27 % — LOW (ref 34.5–45)
HGB BLD-MCNC: 8.8 G/DL — LOW (ref 11.5–15.5)
IRON SATN MFR SERPL: 10 % — LOW (ref 14–50)
IRON SATN MFR SERPL: 15 UG/DL — LOW (ref 30–160)
MCHC RBC-ENTMCNC: 30.7 PG — SIGNIFICANT CHANGE UP (ref 27–34)
MCHC RBC-ENTMCNC: 32.6 GM/DL — SIGNIFICANT CHANGE UP (ref 32–36)
MCV RBC AUTO: 94.1 FL — SIGNIFICANT CHANGE UP (ref 80–100)
NRBC # BLD: 0 /100 WBCS — SIGNIFICANT CHANGE UP (ref 0–0)
NT-PROBNP SERPL-SCNC: 4823 PG/ML — HIGH (ref 0–300)
PLATELET # BLD AUTO: 150 K/UL — SIGNIFICANT CHANGE UP (ref 150–400)
POTASSIUM SERPL-MCNC: 4.1 MMOL/L — SIGNIFICANT CHANGE UP (ref 3.5–5.3)
POTASSIUM SERPL-MCNC: 4.1 MMOL/L — SIGNIFICANT CHANGE UP (ref 3.5–5.3)
POTASSIUM SERPL-SCNC: 4.1 MMOL/L — SIGNIFICANT CHANGE UP (ref 3.5–5.3)
POTASSIUM SERPL-SCNC: 4.1 MMOL/L — SIGNIFICANT CHANGE UP (ref 3.5–5.3)
PROT SERPL-MCNC: 6.5 G/DL — SIGNIFICANT CHANGE UP (ref 6–8.3)
RBC # BLD: 2.87 M/UL — LOW (ref 3.8–5.2)
RBC # BLD: 2.91 M/UL — LOW (ref 3.8–5.2)
RBC # FLD: 13.3 % — SIGNIFICANT CHANGE UP (ref 10.3–14.5)
RETICS #: 61.4 K/UL — SIGNIFICANT CHANGE UP (ref 25–125)
RETICS/RBC NFR: 2.1 % — SIGNIFICANT CHANGE UP (ref 0.5–2.5)
SARS-COV-2 RNA SPEC QL NAA+PROBE: SIGNIFICANT CHANGE UP
SODIUM SERPL-SCNC: 140 MMOL/L — SIGNIFICANT CHANGE UP (ref 135–145)
SODIUM SERPL-SCNC: 140 MMOL/L — SIGNIFICANT CHANGE UP (ref 135–145)
TIBC SERPL-MCNC: 152 UG/DL — LOW (ref 220–430)
UIBC SERPL-MCNC: 138 UG/DL — SIGNIFICANT CHANGE UP (ref 110–370)
WBC # BLD: 9.53 K/UL — SIGNIFICANT CHANGE UP (ref 3.8–10.5)
WBC # FLD AUTO: 9.53 K/UL — SIGNIFICANT CHANGE UP (ref 3.8–10.5)

## 2023-04-17 RX ORDER — FERROUS SULFATE 325(65) MG
325 TABLET ORAL DAILY
Refills: 0 | Status: DISCONTINUED | OUTPATIENT
Start: 2023-04-17 | End: 2023-04-19

## 2023-04-17 RX ADMIN — Medication 100 MILLIGRAM(S): at 05:22

## 2023-04-17 RX ADMIN — Medication 975 MILLIGRAM(S): at 22:25

## 2023-04-17 RX ADMIN — CARVEDILOL PHOSPHATE 25 MILLIGRAM(S): 80 CAPSULE, EXTENDED RELEASE ORAL at 17:31

## 2023-04-17 RX ADMIN — Medication 1 TABLET(S): at 13:45

## 2023-04-17 RX ADMIN — Medication 975 MILLIGRAM(S): at 21:51

## 2023-04-17 RX ADMIN — Medication 975 MILLIGRAM(S): at 13:33

## 2023-04-17 RX ADMIN — SIMVASTATIN 10 MILLIGRAM(S): 20 TABLET, FILM COATED ORAL at 21:49

## 2023-04-17 RX ADMIN — SENNA PLUS 2 TABLET(S): 8.6 TABLET ORAL at 21:49

## 2023-04-17 RX ADMIN — Medication 1 DROP(S): at 17:32

## 2023-04-17 RX ADMIN — AMLODIPINE BESYLATE 10 MILLIGRAM(S): 2.5 TABLET ORAL at 05:16

## 2023-04-17 RX ADMIN — ENOXAPARIN SODIUM 30 MILLIGRAM(S): 100 INJECTION SUBCUTANEOUS at 13:33

## 2023-04-17 RX ADMIN — Medication 2000 UNIT(S): at 13:33

## 2023-04-17 RX ADMIN — CARVEDILOL PHOSPHATE 25 MILLIGRAM(S): 80 CAPSULE, EXTENDED RELEASE ORAL at 05:17

## 2023-04-17 RX ADMIN — PANTOPRAZOLE SODIUM 40 MILLIGRAM(S): 20 TABLET, DELAYED RELEASE ORAL at 05:22

## 2023-04-17 RX ADMIN — BRIMONIDINE TARTRATE 1 DROP(S): 2 SOLUTION/ DROPS OPHTHALMIC at 05:17

## 2023-04-17 RX ADMIN — Medication 975 MILLIGRAM(S): at 06:13

## 2023-04-17 RX ADMIN — Medication 20 MILLIGRAM(S): at 05:16

## 2023-04-17 RX ADMIN — POLYETHYLENE GLYCOL 3350 17 GRAM(S): 17 POWDER, FOR SOLUTION ORAL at 13:32

## 2023-04-17 RX ADMIN — Medication 100 MILLIGRAM(S): at 21:51

## 2023-04-17 RX ADMIN — Medication 1 DROP(S): at 05:17

## 2023-04-17 RX ADMIN — BRIMONIDINE TARTRATE 1 DROP(S): 2 SOLUTION/ DROPS OPHTHALMIC at 17:32

## 2023-04-17 RX ADMIN — Medication 975 MILLIGRAM(S): at 14:30

## 2023-04-17 RX ADMIN — Medication 975 MILLIGRAM(S): at 05:16

## 2023-04-17 NOTE — PROGRESS NOTE ADULT - ASSESSMENT
89yFemale w/ PMH of HTN and HLD c/o L hip pain s/p mechanical fall. Pt was attempting to reach to pick something off the floor when she lost her balance and fell onto her left side. dx with m L displaced transcervical femoral neck fracture on 4/13. pt is now s/p hip hemiarthroplasty. noticed with abn creatinine which was 1.8 on admission now increased to 2       1- CKD IV with KAYLA  2- HTN   3- anemia       KAYLA on baseline CKDIV. baseline creatinine 1.5 range  hold torsemide for worsening creatinine  urinary leukocytosis, urine cx pending.   anemia, trend hgb  iron deficiency, add ferous sulfate 325 mg daily  norvasc 10 mg daily for HTN  hydralazine 100 mg tid for HTN  coreg 25 mg bid for HTN/HF  bladder scan -->143 cc  strict I/O  trend creatinine and electrolytes daily  d/w ortho team when seen earlier

## 2023-04-17 NOTE — PROGRESS NOTE ADULT - ASSESSMENT
A/p: 89y Female s/p Left hemiarthroplasty POD#3.  VSS. NAD.  - FU AM labs  - PT/OT - WBAT with posterior hip precautions. OOB  - Ice/elevation  - DVT PPx: Lovenox 30mg daily  - Pain Control  - Continue Current Tx  - Appreciate medicine recs  - Appreciate nephro recs re KAYLA  - Dispo: anticipating subacute rehab

## 2023-04-18 LAB
ALBUMIN SERPL ELPH-MCNC: 3.2 G/DL — LOW (ref 3.3–5)
ALP SERPL-CCNC: 64 U/L — SIGNIFICANT CHANGE UP (ref 40–120)
ALT FLD-CCNC: <5 U/L — LOW (ref 10–45)
ANION GAP SERPL CALC-SCNC: 13 MMOL/L — SIGNIFICANT CHANGE UP (ref 5–17)
AST SERPL-CCNC: 23 U/L — SIGNIFICANT CHANGE UP (ref 10–40)
BILIRUB SERPL-MCNC: 0.3 MG/DL — SIGNIFICANT CHANGE UP (ref 0.2–1.2)
BUN SERPL-MCNC: 58 MG/DL — HIGH (ref 7–23)
CALCIUM SERPL-MCNC: 9.1 MG/DL — SIGNIFICANT CHANGE UP (ref 8.4–10.5)
CALCIUM SERPL-MCNC: 9.2 MG/DL — SIGNIFICANT CHANGE UP (ref 8.4–10.5)
CHLORIDE SERPL-SCNC: 102 MMOL/L — SIGNIFICANT CHANGE UP (ref 96–108)
CO2 SERPL-SCNC: 24 MMOL/L — SIGNIFICANT CHANGE UP (ref 22–31)
CREAT SERPL-MCNC: 2.45 MG/DL — HIGH (ref 0.5–1.3)
CULTURE RESULTS: SIGNIFICANT CHANGE UP
EGFR: 17 ML/MIN/1.73M2 — LOW
GLUCOSE SERPL-MCNC: 95 MG/DL — SIGNIFICANT CHANGE UP (ref 70–99)
HCT VFR BLD CALC: 27.3 % — LOW (ref 34.5–45)
HGB BLD-MCNC: 8.8 G/DL — LOW (ref 11.5–15.5)
MCHC RBC-ENTMCNC: 30.3 PG — SIGNIFICANT CHANGE UP (ref 27–34)
MCHC RBC-ENTMCNC: 32.2 GM/DL — SIGNIFICANT CHANGE UP (ref 32–36)
MCV RBC AUTO: 94.1 FL — SIGNIFICANT CHANGE UP (ref 80–100)
NRBC # BLD: 0 /100 WBCS — SIGNIFICANT CHANGE UP (ref 0–0)
PHOSPHATE SERPL-MCNC: 2.2 MG/DL — LOW (ref 2.5–4.5)
PLATELET # BLD AUTO: 164 K/UL — SIGNIFICANT CHANGE UP (ref 150–400)
POTASSIUM SERPL-MCNC: 4.3 MMOL/L — SIGNIFICANT CHANGE UP (ref 3.5–5.3)
POTASSIUM SERPL-SCNC: 4.3 MMOL/L — SIGNIFICANT CHANGE UP (ref 3.5–5.3)
PROT SERPL-MCNC: 6.5 G/DL — SIGNIFICANT CHANGE UP (ref 6–8.3)
PTH-INTACT FLD-MCNC: 128 PG/ML — HIGH (ref 15–65)
RBC # BLD: 2.9 M/UL — LOW (ref 3.8–5.2)
RBC # FLD: 13.3 % — SIGNIFICANT CHANGE UP (ref 10.3–14.5)
SODIUM SERPL-SCNC: 139 MMOL/L — SIGNIFICANT CHANGE UP (ref 135–145)
SPECIMEN SOURCE: SIGNIFICANT CHANGE UP
WBC # BLD: 8.83 K/UL — SIGNIFICANT CHANGE UP (ref 3.8–10.5)
WBC # FLD AUTO: 8.83 K/UL — SIGNIFICANT CHANGE UP (ref 3.8–10.5)

## 2023-04-18 PROCEDURE — 76770 US EXAM ABDO BACK WALL COMP: CPT | Mod: 26

## 2023-04-18 RX ORDER — APIXABAN 2.5 MG/1
2.5 TABLET, FILM COATED ORAL
Refills: 0 | Status: DISCONTINUED | OUTPATIENT
Start: 2023-04-18 | End: 2023-04-19

## 2023-04-18 RX ADMIN — BRIMONIDINE TARTRATE 1 DROP(S): 2 SOLUTION/ DROPS OPHTHALMIC at 17:22

## 2023-04-18 RX ADMIN — Medication 2000 UNIT(S): at 11:55

## 2023-04-18 RX ADMIN — Medication 975 MILLIGRAM(S): at 22:32

## 2023-04-18 RX ADMIN — APIXABAN 2.5 MILLIGRAM(S): 2.5 TABLET, FILM COATED ORAL at 17:21

## 2023-04-18 RX ADMIN — POLYETHYLENE GLYCOL 3350 17 GRAM(S): 17 POWDER, FOR SOLUTION ORAL at 11:55

## 2023-04-18 RX ADMIN — Medication 1 TABLET(S): at 11:55

## 2023-04-18 RX ADMIN — Medication 100 MILLIGRAM(S): at 22:02

## 2023-04-18 RX ADMIN — Medication 325 MILLIGRAM(S): at 11:55

## 2023-04-18 RX ADMIN — Medication 975 MILLIGRAM(S): at 22:02

## 2023-04-18 RX ADMIN — SIMVASTATIN 10 MILLIGRAM(S): 20 TABLET, FILM COATED ORAL at 22:02

## 2023-04-18 RX ADMIN — Medication 1 DROP(S): at 17:22

## 2023-04-18 RX ADMIN — Medication 975 MILLIGRAM(S): at 13:34

## 2023-04-18 RX ADMIN — Medication 975 MILLIGRAM(S): at 14:34

## 2023-04-18 RX ADMIN — Medication 975 MILLIGRAM(S): at 06:05

## 2023-04-18 RX ADMIN — SENNA PLUS 2 TABLET(S): 8.6 TABLET ORAL at 22:02

## 2023-04-18 NOTE — PROGRESS NOTE ADULT - NS ATTEND AMEND GEN_ALL_CORE FT
Seen, examined with, formulated plan with and  agree with above as scribed by NP Chantal [Elver]     lungs decrease bs no rales  heart RRR  ext 1+ edema      KAYLA on ckd   chf hx   htn     cr still elevated   hold torsemide today  cont norasc   ucx neg
Seen, examined with, formulated plan with and  agree with above as scribed by NP Chantal [Elver]         alert   heart RRR  ext + edema b/l le   KAYLA on ckd IV  hx chf   UTI       cr higher   hold torsemide temporarily   ucx pending   renal sono   d/w ortho team

## 2023-04-18 NOTE — PROGRESS NOTE ADULT - ASSESSMENT
89yFemale w/ PMH of HTN and HLD c/o L hip pain s/p mechanical fall. Pt was attempting to reach to pick something off the floor when she lost her balance and fell onto her left side. dx with m L displaced transcervical femoral neck fracture on 4/13. pt is now s/p hip hemiarthroplasty. noticed with abn creatinine which was 1.8 on admission now increased to 2       1- CKD IV with KAYLA  2- HTN   3- anemia       KAYLA on baseline CKDIV. baseline creatinine 1.5 range  creatinine remains elevated, may have reached a plateau   hold torsemide for now  urinary leukocytosis, urine cx negative  anemia, trend hgb  iron deficiency, add ferous sulfate 325 mg daily  norvasc 10 mg daily for HTN  hydralazine 100 mg tid for HTN  coreg 25 mg bid for HTN/HF  bladder scan -->143 cc  strict I/O  trend creatinine and electrolytes daily  d/w ortho team when seen earlier

## 2023-04-19 ENCOUNTER — TRANSCRIPTION ENCOUNTER (OUTPATIENT)
Age: 88
End: 2023-04-19

## 2023-04-19 VITALS
OXYGEN SATURATION: 95 % | HEART RATE: 56 BPM | RESPIRATION RATE: 18 BRPM | SYSTOLIC BLOOD PRESSURE: 105 MMHG | DIASTOLIC BLOOD PRESSURE: 53 MMHG | TEMPERATURE: 98 F

## 2023-04-19 LAB
ANION GAP SERPL CALC-SCNC: 11 MMOL/L — SIGNIFICANT CHANGE UP (ref 5–17)
BUN SERPL-MCNC: 56 MG/DL — HIGH (ref 7–23)
CALCIUM SERPL-MCNC: 9.2 MG/DL — SIGNIFICANT CHANGE UP (ref 8.4–10.5)
CHLORIDE SERPL-SCNC: 103 MMOL/L — SIGNIFICANT CHANGE UP (ref 96–108)
CO2 SERPL-SCNC: 25 MMOL/L — SIGNIFICANT CHANGE UP (ref 22–31)
CREAT SERPL-MCNC: 1.98 MG/DL — HIGH (ref 0.5–1.3)
EGFR: 22 ML/MIN/1.73M2 — LOW
GLUCOSE SERPL-MCNC: 105 MG/DL — HIGH (ref 70–99)
HCT VFR BLD CALC: 27 % — LOW (ref 34.5–45)
HGB BLD-MCNC: 8.5 G/DL — LOW (ref 11.5–15.5)
MCHC RBC-ENTMCNC: 29.7 PG — SIGNIFICANT CHANGE UP (ref 27–34)
MCHC RBC-ENTMCNC: 31.5 GM/DL — LOW (ref 32–36)
MCV RBC AUTO: 94.4 FL — SIGNIFICANT CHANGE UP (ref 80–100)
NRBC # BLD: 0 /100 WBCS — SIGNIFICANT CHANGE UP (ref 0–0)
PLATELET # BLD AUTO: 179 K/UL — SIGNIFICANT CHANGE UP (ref 150–400)
POTASSIUM SERPL-MCNC: 4.3 MMOL/L — SIGNIFICANT CHANGE UP (ref 3.5–5.3)
POTASSIUM SERPL-SCNC: 4.3 MMOL/L — SIGNIFICANT CHANGE UP (ref 3.5–5.3)
RBC # BLD: 2.86 M/UL — LOW (ref 3.8–5.2)
RBC # FLD: 13.2 % — SIGNIFICANT CHANGE UP (ref 10.3–14.5)
SODIUM SERPL-SCNC: 139 MMOL/L — SIGNIFICANT CHANGE UP (ref 135–145)
WBC # BLD: 8.74 K/UL — SIGNIFICANT CHANGE UP (ref 3.8–10.5)
WBC # FLD AUTO: 8.74 K/UL — SIGNIFICANT CHANGE UP (ref 3.8–10.5)

## 2023-04-19 PROCEDURE — 97110 THERAPEUTIC EXERCISES: CPT

## 2023-04-19 PROCEDURE — 97530 THERAPEUTIC ACTIVITIES: CPT

## 2023-04-19 PROCEDURE — 81001 URINALYSIS AUTO W/SCOPE: CPT

## 2023-04-19 PROCEDURE — C9399: CPT

## 2023-04-19 PROCEDURE — 97165 OT EVAL LOW COMPLEX 30 MIN: CPT

## 2023-04-19 PROCEDURE — 82728 ASSAY OF FERRITIN: CPT

## 2023-04-19 PROCEDURE — 73501 X-RAY EXAM HIP UNI 1 VIEW: CPT

## 2023-04-19 PROCEDURE — 82310 ASSAY OF CALCIUM: CPT

## 2023-04-19 PROCEDURE — 80053 COMPREHEN METABOLIC PANEL: CPT

## 2023-04-19 PROCEDURE — 73552 X-RAY EXAM OF FEMUR 2/>: CPT

## 2023-04-19 PROCEDURE — U0003: CPT

## 2023-04-19 PROCEDURE — 86850 RBC ANTIBODY SCREEN: CPT

## 2023-04-19 PROCEDURE — 83880 ASSAY OF NATRIURETIC PEPTIDE: CPT

## 2023-04-19 PROCEDURE — 73502 X-RAY EXAM HIP UNI 2-3 VIEWS: CPT

## 2023-04-19 PROCEDURE — 83970 ASSAY OF PARATHORMONE: CPT

## 2023-04-19 PROCEDURE — 97161 PT EVAL LOW COMPLEX 20 MIN: CPT

## 2023-04-19 PROCEDURE — 85730 THROMBOPLASTIN TIME PARTIAL: CPT

## 2023-04-19 PROCEDURE — 97116 GAIT TRAINING THERAPY: CPT

## 2023-04-19 PROCEDURE — U0005: CPT

## 2023-04-19 PROCEDURE — 86901 BLOOD TYPING SEROLOGIC RH(D): CPT

## 2023-04-19 PROCEDURE — 84100 ASSAY OF PHOSPHORUS: CPT

## 2023-04-19 PROCEDURE — 87086 URINE CULTURE/COLONY COUNT: CPT

## 2023-04-19 PROCEDURE — 83605 ASSAY OF LACTIC ACID: CPT

## 2023-04-19 PROCEDURE — 80048 BASIC METABOLIC PNL TOTAL CA: CPT

## 2023-04-19 PROCEDURE — 85045 AUTOMATED RETICULOCYTE COUNT: CPT

## 2023-04-19 PROCEDURE — 99285 EMERGENCY DEPT VISIT HI MDM: CPT

## 2023-04-19 PROCEDURE — 83540 ASSAY OF IRON: CPT

## 2023-04-19 PROCEDURE — 71045 X-RAY EXAM CHEST 1 VIEW: CPT

## 2023-04-19 PROCEDURE — 85027 COMPLETE CBC AUTOMATED: CPT

## 2023-04-19 PROCEDURE — 87635 SARS-COV-2 COVID-19 AMP PRB: CPT

## 2023-04-19 PROCEDURE — C1776: CPT

## 2023-04-19 PROCEDURE — 76770 US EXAM ABDO BACK WALL COMP: CPT

## 2023-04-19 PROCEDURE — 83550 IRON BINDING TEST: CPT

## 2023-04-19 PROCEDURE — 86900 BLOOD TYPING SEROLOGIC ABO: CPT

## 2023-04-19 PROCEDURE — 96374 THER/PROPH/DIAG INJ IV PUSH: CPT

## 2023-04-19 PROCEDURE — 85025 COMPLETE CBC W/AUTO DIFF WBC: CPT

## 2023-04-19 PROCEDURE — 85610 PROTHROMBIN TIME: CPT

## 2023-04-19 PROCEDURE — 36415 COLL VENOUS BLD VENIPUNCTURE: CPT

## 2023-04-19 RX ORDER — FERROUS SULFATE 325(65) MG
1 TABLET ORAL
Qty: 0 | Refills: 0 | DISCHARGE
Start: 2023-04-19

## 2023-04-19 RX ORDER — APIXABAN 2.5 MG/1
1 TABLET, FILM COATED ORAL
Qty: 0 | Refills: 0 | DISCHARGE
Start: 2023-04-19

## 2023-04-19 RX ADMIN — AMLODIPINE BESYLATE 10 MILLIGRAM(S): 2.5 TABLET ORAL at 06:05

## 2023-04-19 RX ADMIN — Medication 100 MILLIGRAM(S): at 06:05

## 2023-04-19 RX ADMIN — Medication 975 MILLIGRAM(S): at 06:35

## 2023-04-19 RX ADMIN — Medication 975 MILLIGRAM(S): at 06:05

## 2023-04-19 RX ADMIN — APIXABAN 2.5 MILLIGRAM(S): 2.5 TABLET, FILM COATED ORAL at 06:06

## 2023-04-19 RX ADMIN — POLYETHYLENE GLYCOL 3350 17 GRAM(S): 17 POWDER, FOR SOLUTION ORAL at 12:17

## 2023-04-19 RX ADMIN — BRIMONIDINE TARTRATE 1 DROP(S): 2 SOLUTION/ DROPS OPHTHALMIC at 06:42

## 2023-04-19 RX ADMIN — PANTOPRAZOLE SODIUM 40 MILLIGRAM(S): 20 TABLET, DELAYED RELEASE ORAL at 06:05

## 2023-04-19 RX ADMIN — Medication 325 MILLIGRAM(S): at 12:18

## 2023-04-19 RX ADMIN — Medication 1 DROP(S): at 06:42

## 2023-04-19 RX ADMIN — CARVEDILOL PHOSPHATE 25 MILLIGRAM(S): 80 CAPSULE, EXTENDED RELEASE ORAL at 06:05

## 2023-04-19 RX ADMIN — Medication 1 TABLET(S): at 12:18

## 2023-04-19 RX ADMIN — Medication 2000 UNIT(S): at 12:41

## 2023-04-19 RX ADMIN — MAGNESIUM HYDROXIDE 30 MILLILITER(S): 400 TABLET, CHEWABLE ORAL at 12:24

## 2023-04-19 RX ADMIN — Medication 975 MILLIGRAM(S): at 14:39

## 2023-04-19 NOTE — DISCHARGE NOTE NURSING/CASE MANAGEMENT/SOCIAL WORK - PATIENT PORTAL LINK FT
You can access the FollowMyHealth Patient Portal offered by [facility] by registering at the following website: [email or web address]. By joining [facility]’s FollowMyHealth portal, you will also be able to view your health information using other applications (apps) compatible with our system.

## 2023-04-19 NOTE — PROGRESS NOTE ADULT - REASON FOR ADMISSION
L fem neck fracture
Yes

## 2023-04-19 NOTE — PROGRESS NOTE ADULT - SUBJECTIVE AND OBJECTIVE BOX
89-year-old female chief complaint left hip pain.  History of hypertension hyperlipidemia. Patient noted a fall this evening. Patient states she dropped something and  lost her balance while trying to pick it up. She  fell onto her left side and could not get up. . Patient is normally walking at baseline but has not been able to walk since the fall. Patient was brought to [facility] for further evaluation and treatment. In the ED she was found to have a left hip fracture. Patient is S/P  an Open reduction and internal fixation of the left hip. Patient tolerated the procedure well. Pain has been well managed and Patient has started working with physical therapy. Patient resting comfortably      MEDICATIONS  (STANDING):  acetaminophen     Tablet .. 975 milliGRAM(s) Oral every 8 hours  amLODIPine   Tablet 10 milliGRAM(s) Oral daily  apixaban 2.5 milliGRAM(s) Oral two times a day  brimonidine 0.2% Ophthalmic Solution 1 Drop(s) Both EYES two times a day  calcium carbonate 1250 mG  + Vitamin D (OsCal 500 + D) 1 Tablet(s) Oral daily  carvedilol 25 milliGRAM(s) Oral every 12 hours  cholecalciferol 2000 Unit(s) Oral daily  ferrous    sulfate 325 milliGRAM(s) Oral daily  hydrALAZINE 100 milliGRAM(s) Oral <User Schedule>  pantoprazole    Tablet 40 milliGRAM(s) Oral before breakfast  polyethylene glycol 3350 17 Gram(s) Oral daily  senna 2 Tablet(s) Oral at bedtime  simvastatin 10 milliGRAM(s) Oral at bedtime  sodium chloride 0.9%. 1000 milliLiter(s) (125 mL/Hr) IV Continuous <Continuous>  timolol 0.5% Solution 1 Drop(s) Both EYES every 12 hours    MEDICATIONS  (PRN):  bisacodyl Suppository 10 milliGRAM(s) Rectal daily PRN If no bowel movement  magnesium hydroxide Suspension 30 milliLiter(s) Oral daily PRN Constipation  melatonin 3 milliGRAM(s) Oral at bedtime PRN Insomnia  ondansetron Injectable 4 milliGRAM(s) IV Push every 6 hours PRN Nausea and/or Vomiting  traMADol 25 milliGRAM(s) Oral every 6 hours PRN Moderate Pain (4 - 6)  traMADol 50 milliGRAM(s) Oral every 6 hours PRN Severe Pain (7 - 10)          VITALS:   T(C): 36.4 (04-19-23 @ 12:06), Max: 36.8 (04-19-23 @ 04:57)  HR: 56 (04-19-23 @ 12:06) (56 - 70)  BP: 105/53 (04-19-23 @ 12:06) (102/60 - 123/79)  RR: 18 (04-19-23 @ 12:06) (18 - 18)  SpO2: 95% (04-19-23 @ 12:06) (94% - 100%)  Wt(kg): --    PHYSICAL EXAM:  GENERAL: NAD, well-groomed, well-developed  HEAD:  Atraumatic, Normocephalic  EYES: EOMI, PERRLA, conjunctiva and sclera clear  ENMT: No tonsillar erythema, exudates, or enlargement; Moist mucous membranes, Good dentition, No lesions  NECK: Supple, No JVD, Normal thyroid  NERVOUS SYSTEM:  Alert & Oriented X3, Good concentration; Motor Strength 5/5 B/L upper and lower extremities; DTRs 2+ intact and symmetric  CHEST/LUNG: Clear to percussion bilaterally; No rales, rhonchi, wheezing, or rubs  HEART: Regular rate and rhythm; No murmurs, rubs, or gallops  ABDOMEN: Soft, Nontender, Nondistended; Bowel sounds present  EXTREMITIES:  2+ Peripheral Pulses, No clubbing, cyanosis, or edema  LYMPH: No lymphadenopathy noted  SKIN: No rashes or lesions    LABS:        CBC Full  -  ( 19 Apr 2023 06:17 )  WBC Count : 8.74 K/uL  RBC Count : 2.86 M/uL  Hemoglobin : 8.5 g/dL  Hematocrit : 27.0 %  Platelet Count - Automated : 179 K/uL  Mean Cell Volume : 94.4 fl  Mean Cell Hemoglobin : 29.7 pg  Mean Cell Hemoglobin Concentration : 31.5 gm/dL  Auto Neutrophil # : x  Auto Lymphocyte # : x  Auto Monocyte # : x  Auto Eosinophil # : x  Auto Basophil # : x  Auto Neutrophil % : x  Auto Lymphocyte % : x  Auto Monocyte % : x  Auto Eosinophil % : x  Auto Basophil % : x    04-19    139  |  103  |  56<H>  ----------------------------<  105<H>  4.3   |  25  |  1.98<H>    Ca    9.2      19 Apr 2023 06:16  Phos  2.2     04-18    TPro  6.5  /  Alb  3.2<L>  /  TBili  0.3  /  DBili  x   /  AST  23  /  ALT  <5<L>  /  AlkPhos  64  04-18    LIVER FUNCTIONS - ( 18 Apr 2023 07:37 )  Alb: 3.2 g/dL / Pro: 6.5 g/dL / ALK PHOS: 64 U/L / ALT: <5 U/L / AST: 23 U/L / GGT: x               CAPILLARY BLOOD GLUCOSE          RADIOLOGY & ADDITIONAL TESTS:      
[facility]   [phone]  RENAL FOLLOW UP NOTE  --------------------------------------------------------------------------------  Chief Complaint:    24 hour events/subjective:    patient seen and examined.   denies sob, denies urinary symptoms    PAST HISTORY  --------------------------------------------------------------------------------  No significant changes to PMH, PSH, FHx, SHx, unless otherwise noted    ALLERGIES & MEDICATIONS  --------------------------------------------------------------------------------  Allergies    No Known Allergies    Intolerances      Standing Inpatient Medications  acetaminophen     Tablet .. 975 milliGRAM(s) Oral every 8 hours  amLODIPine   Tablet 10 milliGRAM(s) Oral daily  apixaban 2.5 milliGRAM(s) Oral two times a day  brimonidine 0.2% Ophthalmic Solution 1 Drop(s) Both EYES two times a day  calcium carbonate 1250 mG  + Vitamin D (OsCal 500 + D) 1 Tablet(s) Oral daily  carvedilol 25 milliGRAM(s) Oral every 12 hours  cholecalciferol 2000 Unit(s) Oral daily  ferrous    sulfate 325 milliGRAM(s) Oral daily  hydrALAZINE 100 milliGRAM(s) Oral <User Schedule>  pantoprazole    Tablet 40 milliGRAM(s) Oral before breakfast  polyethylene glycol 3350 17 Gram(s) Oral daily  senna 2 Tablet(s) Oral at bedtime  simvastatin 10 milliGRAM(s) Oral at bedtime  sodium chloride 0.9%. 1000 milliLiter(s) IV Continuous <Continuous>  timolol 0.5% Solution 1 Drop(s) Both EYES every 12 hours    PRN Inpatient Medications  bisacodyl Suppository 10 milliGRAM(s) Rectal daily PRN  magnesium hydroxide Suspension 30 milliLiter(s) Oral daily PRN  melatonin 3 milliGRAM(s) Oral at bedtime PRN  ondansetron Injectable 4 milliGRAM(s) IV Push every 6 hours PRN  traMADol 50 milliGRAM(s) Oral every 6 hours PRN  traMADol 25 milliGRAM(s) Oral every 6 hours PRN      REVIEW OF SYSTEMS  --------------------------------------------------------------------------------    Gen: denies fevers/chills,  CVS: denies chest pain/palpitations  Resp: denies SOB/Cough  GI: Denies N/V/Abd pain  : Denies dysuria/oliguria/hematuria    VITALS/PHYSICAL EXAM  --------------------------------------------------------------------------------  T(C): 36.3 (04-18-23 @ 12:12), Max: 36.8 (04-18-23 @ 05:13)  HR: 61 (04-18-23 @ 12:12) (61 - 73)  BP: 125/65 (04-18-23 @ 12:12) (100/59 - 125/65)  RR: 18 (04-18-23 @ 12:12) (18 - 18)  SpO2: 95% (04-18-23 @ 12:12) (94% - 97%)  Wt(kg): --        04-17-23 @ 07:01  -  04-18-23 @ 07:00  --------------------------------------------------------  IN: 820 mL / OUT: 1200 mL / NET: -380 mL    04-18-23 @ 07:01  -  04-18-23 @ 14:29  --------------------------------------------------------  IN: 360 mL / OUT: 0 mL / NET: 360 mL      Physical Exam:  	  	Gen: Non toxic comfortable appearing  [place]   	Pulm: decrease bs  no rales or ronchi or wheezing  	CV: RRR, S1S2; no rub  	Abd: +BS, soft, nontender/nondistended  	: No suprapubic tenderness  	UE: Warm, no cyanosis  no clubbing,  no edema  	LE: Warm, no cyanosis  no clubbing, B/L 1+edema  	Neuro: alert and oriented. speech coherent     LABS/STUDIES  --------------------------------------------------------------------------------              8.8    8.83  >-----------<  164      [04-18-23 @ 07:37]              27.3     139  |  102  |  58  ----------------------------<  95      [04-18-23 @ 07:37]  4.3   |  24  |  2.45        Ca     9.2     [04-18-23 @ 07:37]      Phos  2.2     [04-18-23 @ 07:37]    TPro  6.5  /  Alb  3.2  /  TBili  0.3  /  DBili  x   /  AST  23  /  ALT  <5  /  AlkPhos  64  [04-18-23 @ 07:37]          Creatinine Trend:  SCr 2.45 [04-18 @ 07:37]  SCr 2.35 [04-17 @ 05:40]  SCr 2.03 [04-16 @ 07:37]  SCr 1.61 [04-15 @ 06:55]  SCr 1.53 [04-14 @ 14:10]              Urinalysis - [04-16-23 @ 14:29]      Color Light Yellow / Appearance Slightly Turbid / SG 1.011 / pH 6.0      Gluc Negative / Ketone Negative  / Bili Negative / Urobili Negative       Blood Negative / Protein Negative / Leuk Est Large / Nitrite Negative      RBC 3 /  / Hyaline 0 / Gran  / Sq Epi  / Non Sq Epi  / Bacteria Negative      Iron 15, TIBC 152, %sat 10      [04-17-23 @ 05:40]  Ferritin 274      [04-17-23 @ 05:40]  PTH -- (Ca 9.1)      [04-18-23 @ 07:37]   128      
[facility]   [phone]  RENAL FOLLOW UP NOTE  --------------------------------------------------------------------------------  Chief Complaint:    24 hour events/subjective:    patient seen and examined.   denies sob    PAST HISTORY  --------------------------------------------------------------------------------  No significant changes to PMH, PSH, FHx, SHx, unless otherwise noted    ALLERGIES & MEDICATIONS  --------------------------------------------------------------------------------  Allergies    No Known Allergies    Intolerances      Standing Inpatient Medications  acetaminophen     Tablet .. 975 milliGRAM(s) Oral every 8 hours  amLODIPine   Tablet 10 milliGRAM(s) Oral daily  brimonidine 0.2% Ophthalmic Solution 1 Drop(s) Both EYES two times a day  calcium carbonate 1250 mG  + Vitamin D (OsCal 500 + D) 1 Tablet(s) Oral daily  carvedilol 25 milliGRAM(s) Oral every 12 hours  cholecalciferol 2000 Unit(s) Oral daily  enoxaparin Injectable 30 milliGRAM(s) SubCutaneous every 24 hours  hydrALAZINE 100 milliGRAM(s) Oral <User Schedule>  pantoprazole    Tablet 40 milliGRAM(s) Oral before breakfast  polyethylene glycol 3350 17 Gram(s) Oral daily  senna 2 Tablet(s) Oral at bedtime  simvastatin 10 milliGRAM(s) Oral at bedtime  sodium chloride 0.9%. 1000 milliLiter(s) IV Continuous <Continuous>  timolol 0.5% Solution 1 Drop(s) Both EYES every 12 hours    PRN Inpatient Medications  bisacodyl Suppository 10 milliGRAM(s) Rectal daily PRN  magnesium hydroxide Suspension 30 milliLiter(s) Oral daily PRN  melatonin 3 milliGRAM(s) Oral at bedtime PRN  ondansetron Injectable 4 milliGRAM(s) IV Push every 6 hours PRN  traMADol 50 milliGRAM(s) Oral every 6 hours PRN  traMADol 25 milliGRAM(s) Oral every 6 hours PRN      REVIEW OF SYSTEMS  --------------------------------------------------------------------------------    Gen: denies fevers/chills,  CVS: denies chest pain/palpitations  Resp: denies SOB/Cough  GI: Denies N/V/Abd pain  : Denies dysuria/oliguria/hematuria    VITALS/PHYSICAL EXAM  --------------------------------------------------------------------------------  T(C): 36.4 (04-17-23 @ 12:06), Max: 37 (04-17-23 @ 08:23)  HR: 63 (04-17-23 @ 12:30) (63 - 81)  BP: 108/50 (04-17-23 @ 12:30) (103/60 - 124/56)  RR: 18 (04-17-23 @ 12:30) (15 - 18)  SpO2: 93% (04-17-23 @ 12:30) (92% - 93%)  Wt(kg): --        04-16-23 @ 07:01  -  04-17-23 @ 07:00  --------------------------------------------------------  IN: 580 mL / OUT: 2900 mL / NET: -2320 mL    04-17-23 @ 07:01  -  04-17-23 @ 13:27  --------------------------------------------------------  IN: 240 mL / OUT: 550 mL / NET: -310 mL      Physical Exam:  	  	Gen: Non toxic comfortable appearing  [place]   	Pulm: decrease bs  no rales or ronchi or wheezing  	CV: RRR, S1S2; no rub  	Abd: +BS, soft, nontender/nondistended  	: No suprapubic tenderness  	UE: Warm, no cyanosis  no clubbing,  no edema  	LE: Warm, no cyanosis  no clubbing, no edema  	Neuro: alert and oriented. speech coherent     LABS/STUDIES  --------------------------------------------------------------------------------              8.8    9.53  >-----------<  150      [04-17-23 @ 05:40]              27.0     140  |  104  |  52  ----------------------------<  101      [04-17-23 @ 05:40]  4.1   |  24  |  2.35        Ca     9.4     [04-17-23 @ 05:40]    TPro  6.5  /  Alb  3.4  /  TBili  0.4  /  DBili  x   /  AST  23  /  ALT  <5  /  AlkPhos  56  [04-17-23 @ 05:40]          Creatinine Trend:  SCr 2.35 [04-17 @ 05:40]  SCr 2.03 [04-16 @ 07:37]  SCr 1.61 [04-15 @ 06:55]  SCr 1.53 [04-14 @ 14:10]  SCr 1.62 [04-14 @ 01:22]              Urinalysis - [04-16-23 @ 14:29]      Color Light Yellow / Appearance Slightly Turbid / SG 1.011 / pH 6.0      Gluc Negative / Ketone Negative  / Bili Negative / Urobili Negative       Blood Negative / Protein Negative / Leuk Est Large / Nitrite Negative      RBC 3 /  / Hyaline 0 / Gran  / Sq Epi  / Non Sq Epi  / Bacteria Negative      Iron 15, TIBC 152, %sat 10      [04-17-23 @ 05:40]  Ferritin 274      [04-17-23 @ 05:40]      
ORTHO PROGRESS NOTE     Patient is status post left hip hemiarthroplasty for surgical fixation of a left femoral neck fx, POD #2  Pt seen and examined at bedside, denies SOB, CP, Dizziness. N/V/D/HA.    No significant overnight events. Pain well controlled. Patient ambulated with PT, recommended for subacute rehab.     Vital Signs Last 24 Hrs  T(C): 36.6 (16 Apr 2023 05:00), Max: 36.6 (16 Apr 2023 00:00)  T(F): 97.9 (16 Apr 2023 05:00), Max: 97.9 (16 Apr 2023 05:00)  HR: 75 (16 Apr 2023 05:00) (63 - 78)  BP: 114/62 (16 Apr 2023 05:00) (101/61 - 127/63)  BP(mean): --  RR: 18 (16 Apr 2023 05:00) (18 - 18)  SpO2: 93% (16 Apr 2023 05:00) (93% - 97%)    Parameters below as of 16 Apr 2023 05:00  Patient On (Oxygen Delivery Method): room air        Exam:   Gen: No apparent distress  LLE: Aquacel clean dry and intact to the left posterior hip.   BLE: motor intact EHL/FHL/TA/GS .  Sensation is grossly intact to light touch in the distal extremities.    Compartments are soft bilaterally.  Extremities are warm .  DP 2+ BLE     Labs:  CBC Full  -  ( 15 Apr 2023 06:59 )  WBC Count : 10.45 K/uL  RBC Count : 3.08 M/uL  Hemoglobin : 9.4 g/dL  Hematocrit : 29.7 %  Platelet Count - Automated : 148 K/uL  Mean Cell Volume : 96.4 fl  Mean Cell Hemoglobin : 30.5 pg  Mean Cell Hemoglobin Concentration : 31.6 gm/dL        04-15    141  |  105  |  32<H>  ----------------------------<  127<H>  4.5   |  21<L>  |  1.61<H>    Ca    9.2      15 Apr 2023 06:55    AM labs pending     
ORTHO PROGRESS NOTE     Patient seen and examined this morning. No acute events overnight. Pain well controlled. Tolerating diet. Denies N/V/D/F/C.     Vital Signs Last 24 Hrs  T(C): 36.6 (17 Apr 2023 04:59), Max: 36.6 (16 Apr 2023 20:08)  T(F): 97.9 (17 Apr 2023 04:59), Max: 97.9 (17 Apr 2023 04:59)  HR: 67 (17 Apr 2023 04:59) (64 - 69)  BP: 115/66 (17 Apr 2023 04:59) (103/60 - 124/56)  BP(mean): --  RR: 15 (17 Apr 2023 04:59) (15 - 18)  SpO2: 92% (17 Apr 2023 04:59) (92% - 95%)    Parameters below as of 17 Apr 2023 04:59  Patient On (Oxygen Delivery Method): room air                          8.8    9.53  )-----------( 150      ( 17 Apr 2023 05:40 )             27.0     04-17    140  |  104  |  52<H>  ----------------------------<  101<H>  4.1   |  24  |  2.35<H>    Ca    9.4      17 Apr 2023 05:40    TPro  6.5  /  Alb  3.4  /  TBili  0.4  /  DBili  x   /  AST  23  /  ALT  <5<L>  /  AlkPhos  56  04-17          Exam:   Gen: No apparent distress  LLE: Aquacel clean dry and intact to the left posterior hip.   BLE: motor intact EHL/FHL/TA/GS .  Sensation is grossly intact to light touch in the distal extremities.    Compartments are soft bilaterally.  Extremities are warm .  DP 2+ BLE          
Patient evaluated at bedside resting without complaints.  Pt admits pain is well tolerated.  No chest pain, SOB, N/V.    Vitals:  T(C): 36.3 (04-15-23 @ 04:15), Max: 36.9 (04-14-23 @ 10:41)  HR: 68 (04-15-23 @ 04:15) (56 - 82)  BP: 129/65 (04-15-23 @ 04:15) (102/55 - 154/74)  RR: 18 (04-15-23 @ 04:15) (16 - 22)  SpO2: 96% (04-15-23 @ 04:15) (87% - 100%)    Exam:  Alert and Oriented, No Acute Distress  Laterality: LLE     Aquacel dressing is clean, dry, and intact     Abduction pillow in place     (+) PF/DF/EHL/FHL 5/5     Sensation intact to light touch     +DP Pulse  Calves Soft, Non-tender bilaterally    Labs: pending             
Post op Day [ 4]    Patient resting without complaints.  No chest pain, SOB, N/V.    T(C): 36.8 (04-18-23 @ 05:13), Max: 37 (04-17-23 @ 08:23)  HR: 63 (04-18-23 @ 05:13) (63 - 81)  BP: 111/57 (04-18-23 @ 05:13) (102/58 - 116/55)  RR: 18 (04-18-23 @ 05:13) (18 - 18)  SpO2: 95% (04-18-23 @ 05:13) (93% - 97%)  Wt(kg): --    Exam:  Alert and Oriented, No Acute Distress  L hip aquacel c/d/i with soft/compressible compartments  Calves Soft, Non-tender bilaterally  +PF/DF/EHL/FHL  SILT  +DP Pulse                          8.8    9.53  )-----------( 150      ( 17 Apr 2023 05:40 )             27.0    04-17    140  |  104  |  52<H>  ----------------------------<  101<H>  4.1   |  24  |  2.35<H>    Ca    9.4      17 Apr 2023 05:40    TPro  6.5  /  Alb  3.4  /  TBili  0.4  /  DBili  x   /  AST  23  /  ALT  <5<L>  /  AlkPhos  56  04-17      
     Patient is a 89y old  Female who presents with a chief complaint of L fem neck fracture   Patient for surgical fixation of left hip fracture  Patient resting without complaints.  No chest pain, SOB, N/V.    T(C): 37.1 (04-14-23 @ 04:04), Max: 37.3 (04-13-23 @ 20:19)  HR: 90 (04-14-23 @ 04:04) (64 - 90)  BP: 151/77 (04-14-23 @ 04:04) (128/72 - 151/77)  RR: 18 (04-14-23 @ 04:04) (15 - 18)  SpO2: 92% (04-14-23 @ 04:04) (92% - 97%)    Exam:  Alert and Oriented, No Acute Distress  Cards: +S1/S2, RRR  Pulm: CTAB  Lower Extremities:  +DP Pulse                     10.5   10.97 )-----------( 180      ( 14 Apr 2023 01:22 )             32.9    04-14  139  |  103  |  33<H>  ----------------------------<  142<H>  4.3   |  21<L>  |  1.62<H>  Ca    10.0      14 Apr 2023 01:22  TPro  7.5  /  Alb  4.1  /  TBili  0.8  /  DBili  x   /  AST  23  /  ALT  15  /  AlkPhos  63  04-14  
ORTHO PROGRESS NOTE     Patient seen and examined this morning. No acute events overnight. Pain well controlled. Tolerating diet. Denies N/V/D/F/C.     Vital Signs Last 24 Hrs  T(C): 36.8 (19 Apr 2023 04:57), Max: 36.8 (19 Apr 2023 04:57)  T(F): 98.2 (19 Apr 2023 04:57), Max: 98.2 (19 Apr 2023 04:57)  HR: 66 (19 Apr 2023 04:57) (61 - 70)  BP: 121/64 (19 Apr 2023 04:57) (100/59 - 125/65)  BP(mean): --  RR: 18 (19 Apr 2023 04:57) (18 - 18)  SpO2: 96% (19 Apr 2023 04:57) (95% - 100%)    Parameters below as of 19 Apr 2023 04:57  Patient On (Oxygen Delivery Method): room air    Exam:   Gen: No apparent distress  LLE: Aquacel clean dry and intact to the left posterior hip.   BLE: motor intact EHL/FHL/TA/GS .  Sensation is grossly intact to light touch in the distal extremities.    Compartments are soft bilaterally.  Extremities are warm .  DP 2+ BLE          
89-year-old female chief complaint left hip pain.  History of hypertension hyperlipidemia. Patient noted a fall this evening. Patient states she dropped something and  lost her balance while trying to pick it up. She  fell onto her left side and could not get up. . Patient is normally walking at baseline but has not been able to walk since the fall. Patient was brought to [facility] for further evaluation and treatment. In the ED she was found to have a left hip fracture. Patient is S/P  an Open reduction and internal fixation of the left hip. Patient tolerated the procedure well. Pain has been well managed and Patient has started working with physical therapy     MEDICATIONS  (STANDING):  acetaminophen     Tablet .. 975 milliGRAM(s) Oral every 8 hours  amLODIPine   Tablet 10 milliGRAM(s) Oral daily  brimonidine 0.2% Ophthalmic Solution 1 Drop(s) Both EYES two times a day  calcium carbonate 1250 mG  + Vitamin D (OsCal 500 + D) 1 Tablet(s) Oral daily  carvedilol 25 milliGRAM(s) Oral every 12 hours  cholecalciferol 2000 Unit(s) Oral daily  enoxaparin Injectable 30 milliGRAM(s) SubCutaneous every 24 hours  hydrALAZINE 100 milliGRAM(s) Oral <User Schedule>  pantoprazole    Tablet 40 milliGRAM(s) Oral before breakfast  polyethylene glycol 3350 17 Gram(s) Oral daily  senna 2 Tablet(s) Oral at bedtime  simvastatin 10 milliGRAM(s) Oral at bedtime  sodium chloride 0.9%. 1000 milliLiter(s) (125 mL/Hr) IV Continuous <Continuous>  timolol 0.5% Solution 1 Drop(s) Both EYES every 12 hours  torsemide 20 milliGRAM(s) Oral every 12 hours    MEDICATIONS  (PRN):  magnesium hydroxide Suspension 30 milliLiter(s) Oral daily PRN Constipation  melatonin 3 milliGRAM(s) Oral at bedtime PRN Insomnia  ondansetron Injectable 4 milliGRAM(s) IV Push every 6 hours PRN Nausea and/or Vomiting  traMADol 25 milliGRAM(s) Oral every 6 hours PRN Moderate Pain (4 - 6)  traMADol 50 milliGRAM(s) Oral every 6 hours PRN Severe Pain (7 - 10)          VITALS:   T(C): 36.4 (04-15-23 @ 12:10), Max: 36.4 (04-14-23 @ 16:44)  HR: 66 (04-15-23 @ 12:10) (60 - 72)  BP: 106/63 (04-15-23 @ 12:10) (101/61 - 136/69)  RR: 18 (04-15-23 @ 12:10) (18 - 18)  SpO2: 97% (04-15-23 @ 12:10) (91% - 97%)  Wt(kg): --    PHYSICAL EXAM:  GENERAL: NAD, well-groomed, well-developed  HEAD:  Atraumatic, Normocephalic  EYES: EOMI, PERRLA, conjunctiva and sclera clear  ENMT: No tonsillar erythema, exudates, or enlargement; Moist mucous membranes, Good dentition, No lesions  NECK: Supple, No JVD, Normal thyroid  NERVOUS SYSTEM:  Alert & Oriented X3, Good concentration; Motor Strength 5/5 B/L upper and lower extremities; DTRs 2+ intact and symmetric  CHEST/LUNG: Clear to percussion bilaterally; No rales, rhonchi, wheezing, or rubs  HEART: Regular rate and rhythm; No murmurs, rubs, or gallops  ABDOMEN: Soft, Nontender, Nondistended; Bowel sounds present  EXTREMITIES:  2+ Peripheral Pulses, No clubbing, cyanosis, or edema  LYMPH: No lymphadenopathy noted  SKIN: No rashes or lesions      LABS:        CBC Full  -  ( 15 Apr 2023 06:59 )  WBC Count : 10.45 K/uL  RBC Count : 3.08 M/uL  Hemoglobin : 9.4 g/dL  Hematocrit : 29.7 %  Platelet Count - Automated : 148 K/uL  Mean Cell Volume : 96.4 fl  Mean Cell Hemoglobin : 30.5 pg  Mean Cell Hemoglobin Concentration : 31.6 gm/dL  Auto Neutrophil # : x  Auto Lymphocyte # : x  Auto Monocyte # : x  Auto Eosinophil # : x  Auto Basophil # : x  Auto Neutrophil % : x  Auto Lymphocyte % : x  Auto Monocyte % : x  Auto Eosinophil % : x  Auto Basophil % : x    04-15    141  |  105  |  32<H>  ----------------------------<  127<H>  4.5   |  21<L>  |  1.61<H>    Ca    9.2      15 Apr 2023 06:55    TPro  7.5  /  Alb  4.1  /  TBili  0.8  /  DBili  x   /  AST  23  /  ALT  15  /  AlkPhos  63  04-14    LIVER FUNCTIONS - ( 14 Apr 2023 01:22 )  Alb: 4.1 g/dL / Pro: 7.5 g/dL / ALK PHOS: 63 U/L / ALT: 15 U/L / AST: 23 U/L / GGT: x           PT/INR - ( 14 Apr 2023 01:22 )   PT: 13.4 sec;   INR: 1.15 ratio         PTT - ( 14 Apr 2023 01:22 )  PTT:29.2 sec    CAPILLARY BLOOD GLUCOSE          RADIOLOGY & ADDITIONAL TESTS:      
89-year-old female chief complaint left hip pain.  History of hypertension hyperlipidemia. Patient noted a fall this evening. Patient states she dropped something and  lost her balance while trying to pick it up. She  fell onto her left side and could not get up. . Patient is normally walking at baseline but has not been able to walk since the fall. Patient was brought to [facility] for further evaluation and treatment. In the ED she was found to have a left hip fracture. Patient is S/P  an Open reduction and internal fixation of the left hip. Patient tolerated the procedure well. Pain has been well managed and Patient has started working with physical therapy. Patient found to have ARF.     MEDICATIONS  (STANDING):  acetaminophen     Tablet .. 975 milliGRAM(s) Oral every 8 hours  amLODIPine   Tablet 10 milliGRAM(s) Oral daily  apixaban 2.5 milliGRAM(s) Oral two times a day  brimonidine 0.2% Ophthalmic Solution 1 Drop(s) Both EYES two times a day  calcium carbonate 1250 mG  + Vitamin D (OsCal 500 + D) 1 Tablet(s) Oral daily  carvedilol 25 milliGRAM(s) Oral every 12 hours  cholecalciferol 2000 Unit(s) Oral daily  ferrous    sulfate 325 milliGRAM(s) Oral daily  hydrALAZINE 100 milliGRAM(s) Oral <User Schedule>  pantoprazole    Tablet 40 milliGRAM(s) Oral before breakfast  polyethylene glycol 3350 17 Gram(s) Oral daily  senna 2 Tablet(s) Oral at bedtime  simvastatin 10 milliGRAM(s) Oral at bedtime  sodium chloride 0.9%. 1000 milliLiter(s) (125 mL/Hr) IV Continuous <Continuous>  timolol 0.5% Solution 1 Drop(s) Both EYES every 12 hours    MEDICATIONS  (PRN):  bisacodyl Suppository 10 milliGRAM(s) Rectal daily PRN If no bowel movement  magnesium hydroxide Suspension 30 milliLiter(s) Oral daily PRN Constipation  melatonin 3 milliGRAM(s) Oral at bedtime PRN Insomnia  ondansetron Injectable 4 milliGRAM(s) IV Push every 6 hours PRN Nausea and/or Vomiting  traMADol 50 milliGRAM(s) Oral every 6 hours PRN Severe Pain (7 - 10)  traMADol 25 milliGRAM(s) Oral every 6 hours PRN Moderate Pain (4 - 6)          VITALS:   T(C): 36.3 (04-18-23 @ 12:12), Max: 36.8 (04-18-23 @ 05:13)  HR: 61 (04-18-23 @ 12:12) (61 - 73)  BP: 125/65 (04-18-23 @ 12:12) (100/59 - 125/65)  RR: 18 (04-18-23 @ 12:12) (18 - 18)  SpO2: 95% (04-18-23 @ 12:12) (94% - 97%)  Wt(kg): --      PHYSICAL EXAM:  GENERAL: NAD, well-groomed, well-developed  HEAD:  Atraumatic, Normocephalic  EYES: EOMI, PERRLA, conjunctiva and sclera clear  ENMT: No tonsillar erythema, exudates, or enlargement; Moist mucous membranes, Good dentition, No lesions  NECK: Supple, No JVD, Normal thyroid  NERVOUS SYSTEM:  Alert & Oriented X3, Good concentration; Motor Strength 5/5 B/L upper and lower extremities; DTRs 2+ intact and symmetric  CHEST/LUNG: Clear to percussion bilaterally; No rales, rhonchi, wheezing, or rubs  HEART: Regular rate and rhythm; No murmurs, rubs, or gallops  ABDOMEN: Soft, Nontender, Nondistended; Bowel sounds present  EXTREMITIES:  2+ Peripheral Pulses, No clubbing, cyanosis, or edema  LYMPH: No lymphadenopathy noted  SKIN: No rashes or lesions  LABS:        CBC Full  -  ( 18 Apr 2023 07:37 )  WBC Count : 8.83 K/uL  RBC Count : 2.90 M/uL  Hemoglobin : 8.8 g/dL  Hematocrit : 27.3 %  Platelet Count - Automated : 164 K/uL  Mean Cell Volume : 94.1 fl  Mean Cell Hemoglobin : 30.3 pg  Mean Cell Hemoglobin Concentration : 32.2 gm/dL  Auto Neutrophil # : x  Auto Lymphocyte # : x  Auto Monocyte # : x  Auto Eosinophil # : x  Auto Basophil # : x  Auto Neutrophil % : x  Auto Lymphocyte % : x  Auto Monocyte % : x  Auto Eosinophil % : x  Auto Basophil % : x    04-18    139  |  102  |  58<H>  ----------------------------<  95  4.3   |  24  |  2.45<H>    Ca    9.2      18 Apr 2023 07:37  Phos  2.2     04-18    TPro  6.5  /  Alb  3.2<L>  /  TBili  0.3  /  DBili  x   /  AST  23  /  ALT  <5<L>  /  AlkPhos  64  04-18    LIVER FUNCTIONS - ( 18 Apr 2023 07:37 )  Alb: 3.2 g/dL / Pro: 6.5 g/dL / ALK PHOS: 64 U/L / ALT: <5 U/L / AST: 23 U/L / GGT: x               CAPILLARY BLOOD GLUCOSE          RADIOLOGY & ADDITIONAL TESTS:      
89-year-old female chief complaint left hip pain.  History of hypertension hyperlipidemia. Patient noted a fall this evening. Patient states she dropped something and  lost her balance while trying to pick it up. She  fell onto her left side and could not get up. . Patient is normally walking at baseline but has not been able to walk since the fall. Patient was brought to [facility] for further evaluation and treatment. In the ED she was found to have a left hip fracture. Patient is S/P  an Open reduction and internal fixation of the left hip. Patient tolerated the procedure well. Pain has been well managed and Patient has started working with physical therapy. Patient found to have ARF.     MEDICATIONS  (STANDING):  acetaminophen     Tablet .. 975 milliGRAM(s) Oral every 8 hours  amLODIPine   Tablet 10 milliGRAM(s) Oral daily  brimonidine 0.2% Ophthalmic Solution 1 Drop(s) Both EYES two times a day  calcium carbonate 1250 mG  + Vitamin D (OsCal 500 + D) 1 Tablet(s) Oral daily  carvedilol 25 milliGRAM(s) Oral every 12 hours  cholecalciferol 2000 Unit(s) Oral daily  enoxaparin Injectable 30 milliGRAM(s) SubCutaneous every 24 hours  hydrALAZINE 100 milliGRAM(s) Oral <User Schedule>  pantoprazole    Tablet 40 milliGRAM(s) Oral before breakfast  polyethylene glycol 3350 17 Gram(s) Oral daily  senna 2 Tablet(s) Oral at bedtime  simvastatin 10 milliGRAM(s) Oral at bedtime  sodium chloride 0.9%. 1000 milliLiter(s) (125 mL/Hr) IV Continuous <Continuous>  timolol 0.5% Solution 1 Drop(s) Both EYES every 12 hours    MEDICATIONS  (PRN):  bisacodyl Suppository 10 milliGRAM(s) Rectal daily PRN If no bowel movement  magnesium hydroxide Suspension 30 milliLiter(s) Oral daily PRN Constipation  melatonin 3 milliGRAM(s) Oral at bedtime PRN Insomnia  ondansetron Injectable 4 milliGRAM(s) IV Push every 6 hours PRN Nausea and/or Vomiting  traMADol 50 milliGRAM(s) Oral every 6 hours PRN Severe Pain (7 - 10)  traMADol 25 milliGRAM(s) Oral every 6 hours PRN Moderate Pain (4 - 6)          VITALS:   T(C): 36.4 (23 @ 12:06), Max: 37 (23 @ 08:23)  HR: 63 (23 @ 12:30) (63 - 81)  BP: 108/50 (23 @ 12:30) (103/60 - 124/56)  RR: 18 (23 @ 12:30) (15 - 18)  SpO2: 93% (23 @ 12:30) (92% - 93%)  Wt(kg): --    PHYSICAL EXAM:  GENERAL: NAD, well-groomed, well-developed  HEAD:  Atraumatic, Normocephalic  EYES: EOMI, PERRLA, conjunctiva and sclera clear  ENMT: No tonsillar erythema, exudates, or enlargement; Moist mucous membranes, Good dentition, No lesions  NECK: Supple, No JVD, Normal thyroid  NERVOUS SYSTEM:  Alert & Oriented X3, Good concentration; Motor Strength 5/5 B/L upper and lower extremities; DTRs 2+ intact and symmetric  CHEST/LUNG: Clear to percussion bilaterally; No rales, rhonchi, wheezing, or rubs  HEART: Regular rate and rhythm; No murmurs, rubs, or gallops  ABDOMEN: Soft, Nontender, Nondistended; Bowel sounds present  EXTREMITIES:  2+ Peripheral Pulses, No clubbing, cyanosis, or edema  LYMPH: No lymphadenopathy noted  SKIN: No rashes or lesions      LABS:        CBC Full  -  ( 2023 05:40 )  WBC Count : 9.53 K/uL  RBC Count : 2.87 M/uL  Hemoglobin : 8.8 g/dL  Hematocrit : 27.0 %  Platelet Count - Automated : 150 K/uL  Mean Cell Volume : 94.1 fl  Mean Cell Hemoglobin : 30.7 pg  Mean Cell Hemoglobin Concentration : 32.6 gm/dL  Auto Neutrophil # : x  Auto Lymphocyte # : x  Auto Monocyte # : x  Auto Eosinophil # : x  Auto Basophil # : x  Auto Neutrophil % : x  Auto Lymphocyte % : x  Auto Monocyte % : x  Auto Eosinophil % : x  Auto Basophil % : x        140  |  104  |  52<H>  ----------------------------<  101<H>  4.1   |  24  |  2.35<H>    Ca    9.4      2023 05:40    TPro  6.5  /  Alb  3.4  /  TBili  0.4  /  DBili  x   /  AST  23  /  ALT  <5<L>  /  AlkPhos  56  04-17    LIVER FUNCTIONS - ( 2023 05:40 )  Alb: 3.4 g/dL / Pro: 6.5 g/dL / ALK PHOS: 56 U/L / ALT: <5 U/L / AST: 23 U/L / GGT: x             Urinalysis Basic - ( 2023 14:29 )    Color: Light Yellow / Appearance: Slightly Turbid / S.011 / pH: x  Gluc: x / Ketone: Negative  / Bili: Negative / Urobili: Negative   Blood: x / Protein: Negative / Nitrite: Negative   Leuk Esterase: Large / RBC: 3 /hpf /  /HPF   Sq Epi: x / Non Sq Epi: x / Bacteria: Negative      CAPILLARY BLOOD GLUCOSE          RADIOLOGY & ADDITIONAL TESTS:      
Patient is a 89y old  Female who presents with a chief complaint of L fem neck fracture (2023 06:24)      SUBJECTIVE / OVERNIGHT EVENTS: Comfortable   Review of Systems  chest pain no  palpitations no  sob no  nausea no  headache no    MEDICATIONS  (STANDING):  acetaminophen     Tablet .. 975 milliGRAM(s) Oral every 8 hours  amLODIPine   Tablet 10 milliGRAM(s) Oral daily  brimonidine 0.2% Ophthalmic Solution 1 Drop(s) Both EYES two times a day  calcium carbonate 1250 mG  + Vitamin D (OsCal 500 + D) 1 Tablet(s) Oral daily  carvedilol 25 milliGRAM(s) Oral every 12 hours  cholecalciferol 2000 Unit(s) Oral daily  enoxaparin Injectable 30 milliGRAM(s) SubCutaneous every 24 hours  hydrALAZINE 100 milliGRAM(s) Oral <User Schedule>  pantoprazole    Tablet 40 milliGRAM(s) Oral before breakfast  polyethylene glycol 3350 17 Gram(s) Oral daily  senna 2 Tablet(s) Oral at bedtime  simvastatin 10 milliGRAM(s) Oral at bedtime  sodium chloride 0.9%. 1000 milliLiter(s) (125 mL/Hr) IV Continuous <Continuous>  timolol 0.5% Solution 1 Drop(s) Both EYES every 12 hours  torsemide 20 milliGRAM(s) Oral every 12 hours    MEDICATIONS  (PRN):  bisacodyl Suppository 10 milliGRAM(s) Rectal daily PRN If no bowel movement  magnesium hydroxide Suspension 30 milliLiter(s) Oral daily PRN Constipation  melatonin 3 milliGRAM(s) Oral at bedtime PRN Insomnia  ondansetron Injectable 4 milliGRAM(s) IV Push every 6 hours PRN Nausea and/or Vomiting  traMADol 25 milliGRAM(s) Oral every 6 hours PRN Moderate Pain (4 - 6)  traMADol 50 milliGRAM(s) Oral every 6 hours PRN Severe Pain (7 - 10)      Vital Signs Last 24 Hrs  T(C): 36.5 (2023 12:08), Max: 36.6 (2023 00:00)  T(F): 97.7 (2023 12:08), Max: 97.9 (2023 05:00)  HR: 69 (2023 12:08) (63 - 78)  BP: 116/65 (2023 12:08) (103/60 - 127/63)  BP(mean): --  RR: 18 (2023 12:08) (18 - 18)  SpO2: 95% (2023 12:08) (93% - 95%)    Parameters below as of 2023 12:08  Patient On (Oxygen Delivery Method): room air        PHYSICAL EXAM:  GENERAL: NAD  HEAD:  Atraumatic, Normocephalic  EYES: EOMI, PERRLA, conjunctiva and sclera clear  NECK: Supple, No JVD  CHEST/LUNG: Clear to auscultation bilaterally; No wheeze  HEART: Regular rate and rhythm; No murmurs, rubs, or gallops  ABDOMEN: Soft, Nontender, Nondistended; Bowel sounds present  EXTREMITIES:  2+ Peripheral Pulses, No clubbing, cyanosis, or edema  PSYCH: AAOx2  NEUROLOGY: non-focal  SKIN: No rashes or lesions    LABS:                        9.1    11.19 )-----------( 138      ( 2023 07:38 )             28.1     04-16    139  |  104  |  47<H>  ----------------------------<  93  4.3   |  21<L>  |  2.03<H>    Ca    9.4      2023 07:37            Urinalysis Basic - ( 2023 14:29 )    Color: Light Yellow / Appearance: Slightly Turbid / S.011 / pH: x  Gluc: x / Ketone: Negative  / Bili: Negative / Urobili: Negative   Blood: x / Protein: Negative / Nitrite: Negative   Leuk Esterase: Large / RBC: 3 /hpf /  /HPF   Sq Epi: x / Non Sq Epi: x / Bacteria: Negative          RADIOLOGY & ADDITIONAL TESTS:    Imaging Personally Reviewed:    Consultant(s) Notes Reviewed:      Care Discussed with Consultants/Other Providers:

## 2023-04-19 NOTE — PROGRESS NOTE ADULT - PROBLEM SELECTOR PLAN 2
continue amlodipine and hydralazine  continue to monitor BP  will adjust meds as needed  low sodium diet.

## 2023-04-19 NOTE — PROGRESS NOTE ADULT - PROBLEM SELECTOR PLAN 1
Patient tolerated the procedure well  has started working with physical therapy  PO as tolerated  DVT and GI prophylaxis as per ortho
Patient tolerated the procedure well  has started working with physical therapy  PO as tolerated  DVT and GI prophylaxis as per ortho
PT/OT-WBAT, post prec, abd pillow in bed  IS  DVT PPx  Pain Control  Continue Current Tx.  Consultants appreciated    Renato Hung PA-C  Team Pager: #[phone]
Patient tolerated the procedure well  has started working with physical therapy  PO as tolerated  DVT and GI prophylaxis as per ortho
Patient tolerated the procedure well  Seen OOB sitting in [facility]  has started working with physical therapy  PO as tolerated  DVT and GI prophylaxis as per ortho
Patient tolerated the procedure well  has started working with physical therapy  PO as tolerated  DVT and GI prophylaxis as per ortho

## 2023-04-19 NOTE — PROGRESS NOTE ADULT - TIME BILLING
Discussed treatment plan with patient at bedside.

## 2023-04-19 NOTE — PROGRESS NOTE ADULT - PROVIDER SPECIALTY LIST ADULT
Orthopedics
Nephrology
Orthopedics
Orthopedics
Nephrology
Internal Medicine

## 2023-04-19 NOTE — DISCHARGE NOTE NURSING/CASE MANAGEMENT/SOCIAL WORK - NSDCPEFALRISK_GEN_ALL_CORE
For information on Fall & Injury Prevention, visit: [email or web address] OR  [email or web address] OR  https://www.cdc.gov/steadi/patient.html

## 2023-04-19 NOTE — PROGRESS NOTE ADULT - PROBLEM SELECTOR PLAN 5
continue to monitor creatinine  nephrology following  diuretics on hold  encourage PO
decrease diuretic Torsemide to 20 mg daily  follow electrolytes and Cr.
Sono was unremarkable  renal function has improved  continue to monitor

## 2023-04-19 NOTE — PROGRESS NOTE ADULT - ASSESSMENT
A/p: 89y Female s/p Left hemiarthroplasty POD#5.  VSS. NAD.  - FU AM labs  - PT/OT - WBAT with posterior hip precautions. OOB  - Ice/elevation  - DVT PPx: Lovenox 30mg daily  - Pain Control  - Continue Current Tx  - Appreciate medicine recs  - Appreciate nephro recs re KAYLA  - Dispo: anticipating subacute rehab

## 2023-04-19 NOTE — PROGRESS NOTE ADULT - PROBLEM SELECTOR PLAN 3
Patient is on carvedilol and torsemide  Appears euvolemic  continue to monitor  fluid status
Patient is on carvedilol and torsemide  Patient found to have ARF and torsemide was held  continue to monitor creatinine and follow for fluid overload  continue to monitor  fluid status
Patient is on carvedilol and torsemide  Patient found to have ARF and torsemide was held  continue to monitor creatinine and follow for fluid overload  continue to monitor  fluid status
Patient is on carvedilol and torsemide  Appears euvolemic  continue to monitor  fluid status
Patient is on carvedilol and torsemide  Patient found to have ARF and torsemide was held  continue to monitor creatinine and follow for fluid overload  continue to monitor  fluid status

## 2023-04-19 NOTE — PROGRESS NOTE ADULT - PROBLEM SELECTOR PLAN 4
Pain meds as needed  follow for oversedation  GI regime to prevent constipation.

## 2023-04-26 PROBLEM — Z00.00 ENCOUNTER FOR PREVENTIVE HEALTH EXAMINATION: Status: ACTIVE | Noted: 2023-04-26

## 2023-05-04 ENCOUNTER — APPOINTMENT (OUTPATIENT)
Dept: ORTHOPEDIC SURGERY | Facility: CLINIC | Age: 88
End: 2023-05-04
Payer: MEDICARE

## 2023-05-04 VITALS
WEIGHT: 140 LBS | OXYGEN SATURATION: 98 % | TEMPERATURE: 97.5 F | BODY MASS INDEX: 21.97 KG/M2 | HEART RATE: 77 BPM | HEIGHT: 67 IN | SYSTOLIC BLOOD PRESSURE: 106 MMHG | DIASTOLIC BLOOD PRESSURE: 49 MMHG

## 2023-05-04 PROCEDURE — 99024 POSTOP FOLLOW-UP VISIT: CPT

## 2023-05-04 PROCEDURE — 73502 X-RAY EXAM HIP UNI 2-3 VIEWS: CPT

## 2023-07-02 ENCOUNTER — INPATIENT (INPATIENT)
Facility: HOSPITAL | Age: 88
LOS: 7 days | Discharge: HOME HEALTH | DRG: 175 | End: 2023-07-10
Attending: HOSPITALIST | Admitting: HOSPITALIST
Payer: COMMERCIAL

## 2023-07-02 VITALS
TEMPERATURE: 98 F | WEIGHT: 130.07 LBS | RESPIRATION RATE: 18 BRPM | HEART RATE: 71 BPM | HEIGHT: 67 IN | OXYGEN SATURATION: 99 % | DIASTOLIC BLOOD PRESSURE: 76 MMHG | SYSTOLIC BLOOD PRESSURE: 136 MMHG

## 2023-07-02 LAB
ALBUMIN SERPL ELPH-MCNC: 3.6 G/DL — SIGNIFICANT CHANGE UP (ref 3.3–5)
ALP SERPL-CCNC: 92 U/L — SIGNIFICANT CHANGE UP (ref 40–120)
ALT FLD-CCNC: 45 U/L — SIGNIFICANT CHANGE UP (ref 10–45)
ANION GAP SERPL CALC-SCNC: 14 MMOL/L — SIGNIFICANT CHANGE UP (ref 5–17)
APTT BLD: 26.9 SEC — LOW (ref 27.5–35.5)
AST SERPL-CCNC: 66 U/L — HIGH (ref 10–40)
BASE EXCESS BLDV CALC-SCNC: 4.2 MMOL/L — HIGH (ref -2–3)
BASOPHILS # BLD AUTO: 0.02 K/UL — SIGNIFICANT CHANGE UP (ref 0–0.2)
BASOPHILS NFR BLD AUTO: 0.2 % — SIGNIFICANT CHANGE UP (ref 0–2)
BILIRUB SERPL-MCNC: 0.6 MG/DL — SIGNIFICANT CHANGE UP (ref 0.2–1.2)
BUN SERPL-MCNC: 62 MG/DL — HIGH (ref 7–23)
CALCIUM SERPL-MCNC: 9.6 MG/DL — SIGNIFICANT CHANGE UP (ref 8.4–10.5)
CHLORIDE SERPL-SCNC: 101 MMOL/L — SIGNIFICANT CHANGE UP (ref 96–108)
CO2 BLDV-SCNC: 28 MMOL/L — HIGH (ref 22–26)
CO2 SERPL-SCNC: 24 MMOL/L — SIGNIFICANT CHANGE UP (ref 22–31)
CREAT SERPL-MCNC: 2.52 MG/DL — HIGH (ref 0.5–1.3)
EGFR: 17 ML/MIN/1.73M2 — LOW
EOSINOPHIL # BLD AUTO: 0.03 K/UL — SIGNIFICANT CHANGE UP (ref 0–0.5)
EOSINOPHIL NFR BLD AUTO: 0.3 % — SIGNIFICANT CHANGE UP (ref 0–6)
GAS PNL BLDV: SIGNIFICANT CHANGE UP
GLUCOSE SERPL-MCNC: 141 MG/DL — HIGH (ref 70–99)
HCO3 BLDV-SCNC: 27 MMOL/L — SIGNIFICANT CHANGE UP (ref 22–29)
HCT VFR BLD CALC: 31.7 % — LOW (ref 34.5–45)
HGB BLD-MCNC: 9.9 G/DL — LOW (ref 11.5–15.5)
IMM GRANULOCYTES NFR BLD AUTO: 0.4 % — SIGNIFICANT CHANGE UP (ref 0–0.9)
INR BLD: 1.28 RATIO — HIGH (ref 0.88–1.16)
LYMPHOCYTES # BLD AUTO: 1.82 K/UL — SIGNIFICANT CHANGE UP (ref 1–3.3)
LYMPHOCYTES # BLD AUTO: 19.8 % — SIGNIFICANT CHANGE UP (ref 13–44)
MCHC RBC-ENTMCNC: 29.6 PG — SIGNIFICANT CHANGE UP (ref 27–34)
MCHC RBC-ENTMCNC: 31.2 GM/DL — LOW (ref 32–36)
MCV RBC AUTO: 94.9 FL — SIGNIFICANT CHANGE UP (ref 80–100)
MONOCYTES # BLD AUTO: 0.67 K/UL — SIGNIFICANT CHANGE UP (ref 0–0.9)
MONOCYTES NFR BLD AUTO: 7.3 % — SIGNIFICANT CHANGE UP (ref 2–14)
NEUTROPHILS # BLD AUTO: 6.61 K/UL — SIGNIFICANT CHANGE UP (ref 1.8–7.4)
NEUTROPHILS NFR BLD AUTO: 72 % — SIGNIFICANT CHANGE UP (ref 43–77)
NRBC # BLD: 0 /100 WBCS — SIGNIFICANT CHANGE UP (ref 0–0)
NT-PROBNP SERPL-SCNC: HIGH PG/ML (ref 0–300)
PCO2 BLDV: 35 MMHG — LOW (ref 39–42)
PH BLDV: 7.5 — HIGH (ref 7.32–7.43)
PLATELET # BLD AUTO: 168 K/UL — SIGNIFICANT CHANGE UP (ref 150–400)
PO2 BLDV: 41 MMHG — SIGNIFICANT CHANGE UP (ref 25–45)
POTASSIUM SERPL-MCNC: 6.1 MMOL/L — HIGH (ref 3.5–5.3)
POTASSIUM SERPL-SCNC: 6.1 MMOL/L — HIGH (ref 3.5–5.3)
PROT SERPL-MCNC: 7.7 G/DL — SIGNIFICANT CHANGE UP (ref 6–8.3)
PROTHROM AB SERPL-ACNC: 14.7 SEC — HIGH (ref 10.5–13.4)
RBC # BLD: 3.34 M/UL — LOW (ref 3.8–5.2)
RBC # FLD: 14.7 % — HIGH (ref 10.3–14.5)
SAO2 % BLDV: 70.9 % — SIGNIFICANT CHANGE UP (ref 67–88)
SODIUM SERPL-SCNC: 139 MMOL/L — SIGNIFICANT CHANGE UP (ref 135–145)
TROPONIN T, HIGH SENSITIVITY RESULT: 109 NG/L — HIGH (ref 0–51)
WBC # BLD: 9.19 K/UL — SIGNIFICANT CHANGE UP (ref 3.8–10.5)
WBC # FLD AUTO: 9.19 K/UL — SIGNIFICANT CHANGE UP (ref 3.8–10.5)

## 2023-07-02 PROCEDURE — 99285 EMERGENCY DEPT VISIT HI MDM: CPT

## 2023-07-02 PROCEDURE — 71045 X-RAY EXAM CHEST 1 VIEW: CPT | Mod: 26

## 2023-07-02 NOTE — ED PROVIDER NOTE - PROGRESS NOTE DETAILS
Duyen Cunningham MD; [facility] PGY3: CTX ordered for (+) UA, will admit for UTI and c/f HF exacerbation

## 2023-07-02 NOTE — ED ADULT NURSE NOTE - OBJECTIVE STATEMENT
89y F A&Ox4 C/o SOB. Pt states that she has been having intermittent SOB and weakness for the past 2-3 days. Pt usually ambulates with walker independently. Upon assessment pt o2 saturation and respirations WNL, No swelling of the extremities, lungs clear B/L. Extremities strong x4. Denies any cough/fever/N/V/D/CP/Gi/ symptoms. PMH of HTN and osteoarthritis. PSH of left partial hip replacement. VSS

## 2023-07-02 NOTE — ED ADULT NURSE NOTE - NSSEPSISNEWALTERMENTAL_ED_A_ED
Chronic issue. Has been going on for several months. States that she has noticed posterior knee pain.  Denies any history of falls or injury.  Went to a chiropractor and PT was recommended.   No

## 2023-07-02 NOTE — ED PROVIDER NOTE - OBJECTIVE STATEMENT
89-year-old female past medical history of hypertension, hyperlipidemia, recent admit in April of this year for left hip fracture status post repair and subsequent rehab admission.  Here for shortness of breath and generalized weakness x2 days states that shortness of breath is worse on exertion, denies any difficulty breathing, chest pain, fevers, cough, sore throat, nausea, vomiting, belly pain, pain when she urinates, sick contacts, recent travel.  Patient ambulates with a cane since discharge from hospital states there is no pain in the left hip or noted bleeding from any sites.  No known cardiac history, since patient states that she had a valve problem in the past but this may have resolved.

## 2023-07-02 NOTE — ED ADULT TRIAGE NOTE - CHIEF COMPLAINT QUOTE
recent surgery for left hip fx; recent dc from rehab; presents with general weakness and sob x 2 days

## 2023-07-02 NOTE — ED PROVIDER NOTE - NS ED ROS FT
Gen: No fever, normal appetite  Eyes: No eye irritation or discharge  ENT: No ear pain, congestion, sore throat  Resp: No cough (+) trouble breathing  Cardiovascular: No chest pain or palpitation  Gastroenteric: No nausea/vomiting, diarrhea, constipation  :  No change in urine output; no dysuria  MS: No joint or muscle pain  Skin: No rashes  Neuro: No headache; no abnormal movements  Remainder negative, except as per the HPI

## 2023-07-02 NOTE — ED ADULT NURSE NOTE - NSFALLHARMRISKINTERV_ED_ALL_ED

## 2023-07-02 NOTE — ED PROVIDER NOTE - CLINICAL SUMMARY MEDICAL DECISION MAKING FREE TEXT BOX
89-year-old female past medical history as above here for shortness of breath and generalized weakness times days.  Physical exam and vital signs as above.  Concern for acute coronary syndrome versus heart failure exacerbation versus viral syndrome versus UTI versus pneumonia.  Will work-up for the after mentioned with CBC, CMP, RVP, Trope, BNP, antibiotics as indicated.  Dispo pending clinical course.

## 2023-07-02 NOTE — ED PROVIDER NOTE - ATTENDING CONTRIBUTION TO CARE
Attending MD Pacheco: I personally have seen and examined this patient.  Resident note reviewed and agree on plan of care and except where noted.  See below for details.     Seen in [facility], accompanied by son    89F with PMH/PSH including HTN, HLD, L hip fx s/p repair and rehab presents to the ED with shortness of breath, generalized weakness and malaise for two days.  Reports symptoms worse with exertion, denies association with chest pain.  Denies abdominal pain, nausea, vomiting, diarrhea, bloody or black stools. Denies dysuria, hematuria, change in urinary habits including frequency, urgency.  Reports baseline ambulation with cane.  Denies worsened pain at L hip.  Denies fevers, chills.    TO BE COMPLETED

## 2023-07-03 DIAGNOSIS — R09.89 OTHER SPECIFIED SYMPTOMS AND SIGNS INVOLVING THE CIRCULATORY AND RESPIRATORY SYSTEMS: ICD-10-CM

## 2023-07-03 DIAGNOSIS — S72.002A FRACTURE OF UNSPECIFIED PART OF NECK OF LEFT FEMUR, INITIAL ENCOUNTER FOR CLOSED FRACTURE: ICD-10-CM

## 2023-07-03 DIAGNOSIS — N17.9 ACUTE KIDNEY FAILURE, UNSPECIFIED: ICD-10-CM

## 2023-07-03 DIAGNOSIS — I10 ESSENTIAL (PRIMARY) HYPERTENSION: ICD-10-CM

## 2023-07-03 DIAGNOSIS — R06.00 DYSPNEA, UNSPECIFIED: ICD-10-CM

## 2023-07-03 DIAGNOSIS — I50.9 HEART FAILURE, UNSPECIFIED: ICD-10-CM

## 2023-07-03 DIAGNOSIS — N39.0 URINARY TRACT INFECTION, SITE NOT SPECIFIED: ICD-10-CM

## 2023-07-03 LAB
ANION GAP SERPL CALC-SCNC: 16 MMOL/L — SIGNIFICANT CHANGE UP (ref 5–17)
APPEARANCE UR: ABNORMAL
APTT BLD: 68.6 SEC — HIGH (ref 27.5–35.5)
BACTERIA # UR AUTO: ABNORMAL
BILIRUB UR-MCNC: NEGATIVE — SIGNIFICANT CHANGE UP
BLD GP AB SCN SERPL QL: NEGATIVE — SIGNIFICANT CHANGE UP
BUN SERPL-MCNC: 61 MG/DL — HIGH (ref 7–23)
CALCIUM SERPL-MCNC: 9.9 MG/DL — SIGNIFICANT CHANGE UP (ref 8.4–10.5)
CHLORIDE SERPL-SCNC: 101 MMOL/L — SIGNIFICANT CHANGE UP (ref 96–108)
CO2 SERPL-SCNC: 24 MMOL/L — SIGNIFICANT CHANGE UP (ref 22–31)
COLOR SPEC: YELLOW — SIGNIFICANT CHANGE UP
CREAT SERPL-MCNC: 2.64 MG/DL — HIGH (ref 0.5–1.3)
DIFF PNL FLD: ABNORMAL
EGFR: 16 ML/MIN/1.73M2 — LOW
EPI CELLS # UR: 2 /HPF — SIGNIFICANT CHANGE UP
GLUCOSE SERPL-MCNC: 150 MG/DL — HIGH (ref 70–99)
GLUCOSE UR QL: NEGATIVE — SIGNIFICANT CHANGE UP
HCT VFR BLD CALC: 32.3 % — LOW (ref 34.5–45)
HGB BLD-MCNC: 9.9 G/DL — LOW (ref 11.5–15.5)
HYALINE CASTS # UR AUTO: 2 /LPF — SIGNIFICANT CHANGE UP (ref 0–2)
KETONES UR-MCNC: NEGATIVE — SIGNIFICANT CHANGE UP
LEUKOCYTE ESTERASE UR-ACNC: ABNORMAL
MCHC RBC-ENTMCNC: 28.9 PG — SIGNIFICANT CHANGE UP (ref 27–34)
MCHC RBC-ENTMCNC: 30.7 GM/DL — LOW (ref 32–36)
MCV RBC AUTO: 94.4 FL — SIGNIFICANT CHANGE UP (ref 80–100)
NITRITE UR-MCNC: NEGATIVE — SIGNIFICANT CHANGE UP
NRBC # BLD: 0 /100 WBCS — SIGNIFICANT CHANGE UP (ref 0–0)
PH UR: 6 — SIGNIFICANT CHANGE UP (ref 5–8)
PLATELET # BLD AUTO: 143 K/UL — LOW (ref 150–400)
POTASSIUM SERPL-MCNC: 4.9 MMOL/L — SIGNIFICANT CHANGE UP (ref 3.5–5.3)
POTASSIUM SERPL-SCNC: 4.9 MMOL/L — SIGNIFICANT CHANGE UP (ref 3.5–5.3)
PROT UR-MCNC: ABNORMAL
RAPID RVP RESULT: SIGNIFICANT CHANGE UP
RBC # BLD: 3.42 M/UL — LOW (ref 3.8–5.2)
RBC # FLD: 14.4 % — SIGNIFICANT CHANGE UP (ref 10.3–14.5)
RBC CASTS # UR COMP ASSIST: 5 /HPF — HIGH (ref 0–4)
RH IG SCN BLD-IMP: POSITIVE — SIGNIFICANT CHANGE UP
SARS-COV-2 RNA SPEC QL NAA+PROBE: SIGNIFICANT CHANGE UP
SODIUM SERPL-SCNC: 141 MMOL/L — SIGNIFICANT CHANGE UP (ref 135–145)
SP GR SPEC: 1.01 — SIGNIFICANT CHANGE UP (ref 1.01–1.02)
TROPONIN T, HIGH SENSITIVITY RESULT: 116 NG/L — HIGH (ref 0–51)
TSH SERPL-MCNC: 5.03 UIU/ML — HIGH (ref 0.27–4.2)
UROBILINOGEN FLD QL: NEGATIVE — SIGNIFICANT CHANGE UP
WBC # BLD: 8.71 K/UL — SIGNIFICANT CHANGE UP (ref 3.8–10.5)
WBC # FLD AUTO: 8.71 K/UL — SIGNIFICANT CHANGE UP (ref 3.8–10.5)
WBC UR QL: 534 /HPF — HIGH (ref 0–5)

## 2023-07-03 PROCEDURE — 99497 ADVNCD CARE PLAN 30 MIN: CPT | Mod: 25

## 2023-07-03 PROCEDURE — 99222 1ST HOSP IP/OBS MODERATE 55: CPT

## 2023-07-03 PROCEDURE — 93306 TTE W/DOPPLER COMPLETE: CPT | Mod: 26

## 2023-07-03 PROCEDURE — 93970 EXTREMITY STUDY: CPT | Mod: 26

## 2023-07-03 PROCEDURE — 76770 US EXAM ABDO BACK WALL COMP: CPT | Mod: 26

## 2023-07-03 RX ORDER — SIMVASTATIN 20 MG/1
1 TABLET, FILM COATED ORAL
Refills: 0 | DISCHARGE

## 2023-07-03 RX ORDER — APIXABAN 2.5 MG/1
2.5 TABLET, FILM COATED ORAL
Refills: 0 | Status: DISCONTINUED | OUTPATIENT
Start: 2023-07-03 | End: 2023-07-03

## 2023-07-03 RX ORDER — FERROUS SULFATE 325(65) MG
325 TABLET ORAL DAILY
Refills: 0 | Status: DISCONTINUED | OUTPATIENT
Start: 2023-07-03 | End: 2023-07-10

## 2023-07-03 RX ORDER — FUROSEMIDE 40 MG
40 TABLET ORAL ONCE
Refills: 0 | Status: COMPLETED | OUTPATIENT
Start: 2023-07-03 | End: 2023-07-03

## 2023-07-03 RX ORDER — AMLODIPINE BESYLATE 2.5 MG/1
10 TABLET ORAL DAILY
Refills: 0 | Status: DISCONTINUED | OUTPATIENT
Start: 2023-07-03 | End: 2023-07-08

## 2023-07-03 RX ORDER — ONDANSETRON 8 MG/1
4 TABLET, FILM COATED ORAL EVERY 8 HOURS
Refills: 0 | Status: DISCONTINUED | OUTPATIENT
Start: 2023-07-03 | End: 2023-07-03

## 2023-07-03 RX ORDER — POLYETHYLENE GLYCOL 3350 17 G/17G
17 POWDER, FOR SOLUTION ORAL DAILY
Refills: 0 | Status: DISCONTINUED | OUTPATIENT
Start: 2023-07-03 | End: 2023-07-07

## 2023-07-03 RX ORDER — PANTOPRAZOLE SODIUM 20 MG/1
40 TABLET, DELAYED RELEASE ORAL
Refills: 0 | Status: DISCONTINUED | OUTPATIENT
Start: 2023-07-03 | End: 2023-07-10

## 2023-07-03 RX ORDER — ATORVASTATIN CALCIUM 80 MG/1
40 TABLET, FILM COATED ORAL AT BEDTIME
Refills: 0 | Status: DISCONTINUED | OUTPATIENT
Start: 2023-07-03 | End: 2023-07-10

## 2023-07-03 RX ORDER — CEFTRIAXONE 500 MG/1
1000 INJECTION, POWDER, FOR SOLUTION INTRAMUSCULAR; INTRAVENOUS EVERY 24 HOURS
Refills: 0 | Status: COMPLETED | OUTPATIENT
Start: 2023-07-03 | End: 2023-07-05

## 2023-07-03 RX ORDER — ACETAMINOPHEN 500 MG
650 TABLET ORAL EVERY 6 HOURS
Refills: 0 | Status: DISCONTINUED | OUTPATIENT
Start: 2023-07-03 | End: 2023-07-10

## 2023-07-03 RX ORDER — LANOLIN ALCOHOL/MO/W.PET/CERES
3 CREAM (GRAM) TOPICAL AT BEDTIME
Refills: 0 | Status: DISCONTINUED | OUTPATIENT
Start: 2023-07-03 | End: 2023-07-10

## 2023-07-03 RX ORDER — TIMOLOL 0.5 %
1 DROPS OPHTHALMIC (EYE)
Refills: 0 | DISCHARGE

## 2023-07-03 RX ORDER — TRAMADOL HYDROCHLORIDE 50 MG/1
25 TABLET ORAL EVERY 6 HOURS
Refills: 0 | Status: DISCONTINUED | OUTPATIENT
Start: 2023-07-03 | End: 2023-07-03

## 2023-07-03 RX ORDER — TIMOLOL 0.5 %
1 DROPS OPHTHALMIC (EYE)
Refills: 0 | Status: DISCONTINUED | OUTPATIENT
Start: 2023-07-03 | End: 2023-07-10

## 2023-07-03 RX ORDER — BRIMONIDINE TARTRATE 2 MG/MG
1 SOLUTION/ DROPS OPHTHALMIC
Refills: 0 | DISCHARGE

## 2023-07-03 RX ORDER — HYDRALAZINE HCL 50 MG
100 TABLET ORAL THREE TIMES A DAY
Refills: 0 | Status: DISCONTINUED | OUTPATIENT
Start: 2023-07-03 | End: 2023-07-08

## 2023-07-03 RX ORDER — BRIMONIDINE TARTRATE 2 MG/MG
1 SOLUTION/ DROPS OPHTHALMIC
Refills: 0 | Status: DISCONTINUED | OUTPATIENT
Start: 2023-07-03 | End: 2023-07-10

## 2023-07-03 RX ORDER — CARVEDILOL PHOSPHATE 80 MG/1
25 CAPSULE, EXTENDED RELEASE ORAL EVERY 12 HOURS
Refills: 0 | Status: DISCONTINUED | OUTPATIENT
Start: 2023-07-03 | End: 2023-07-08

## 2023-07-03 RX ORDER — TRAMADOL HYDROCHLORIDE 50 MG/1
50 TABLET ORAL EVERY 6 HOURS
Refills: 0 | Status: DISCONTINUED | OUTPATIENT
Start: 2023-07-03 | End: 2023-07-03

## 2023-07-03 RX ORDER — CEFTRIAXONE 500 MG/1
1000 INJECTION, POWDER, FOR SOLUTION INTRAMUSCULAR; INTRAVENOUS ONCE
Refills: 0 | Status: COMPLETED | OUTPATIENT
Start: 2023-07-03 | End: 2023-07-03

## 2023-07-03 RX ORDER — SENNA PLUS 8.6 MG/1
2 TABLET ORAL AT BEDTIME
Refills: 0 | Status: DISCONTINUED | OUTPATIENT
Start: 2023-07-03 | End: 2023-07-10

## 2023-07-03 RX ORDER — HEPARIN SODIUM 5000 [USP'U]/ML
INJECTION INTRAVENOUS; SUBCUTANEOUS
Qty: 25000 | Refills: 0 | Status: DISCONTINUED | OUTPATIENT
Start: 2023-07-03 | End: 2023-07-06

## 2023-07-03 RX ADMIN — HEPARIN SODIUM 1100 UNIT(S)/HR: 5000 INJECTION INTRAVENOUS; SUBCUTANEOUS at 15:54

## 2023-07-03 RX ADMIN — CARVEDILOL PHOSPHATE 25 MILLIGRAM(S): 80 CAPSULE, EXTENDED RELEASE ORAL at 17:14

## 2023-07-03 RX ADMIN — HEPARIN SODIUM 1100 UNIT(S)/HR: 5000 INJECTION INTRAVENOUS; SUBCUTANEOUS at 23:18

## 2023-07-03 RX ADMIN — Medication 100 MILLIGRAM(S): at 21:31

## 2023-07-03 RX ADMIN — Medication 1 TABLET(S): at 15:15

## 2023-07-03 RX ADMIN — Medication 40 MILLIGRAM(S): at 01:19

## 2023-07-03 RX ADMIN — BRIMONIDINE TARTRATE 1 DROP(S): 2 SOLUTION/ DROPS OPHTHALMIC at 21:30

## 2023-07-03 RX ADMIN — ATORVASTATIN CALCIUM 40 MILLIGRAM(S): 80 TABLET, FILM COATED ORAL at 21:32

## 2023-07-03 RX ADMIN — CEFTRIAXONE 100 MILLIGRAM(S): 500 INJECTION, POWDER, FOR SOLUTION INTRAMUSCULAR; INTRAVENOUS at 15:14

## 2023-07-03 RX ADMIN — CEFTRIAXONE 100 MILLIGRAM(S): 500 INJECTION, POWDER, FOR SOLUTION INTRAMUSCULAR; INTRAVENOUS at 02:09

## 2023-07-03 RX ADMIN — SENNA PLUS 2 TABLET(S): 8.6 TABLET ORAL at 21:30

## 2023-07-03 RX ADMIN — Medication 1 DROP(S): at 21:31

## 2023-07-03 RX ADMIN — Medication 325 MILLIGRAM(S): at 15:15

## 2023-07-03 NOTE — H&P ADULT - NSHPLABSRESULTS_GEN_ALL_CORE
I have personally reviewed the CXR: no consolidation, pleural effusion, cardiac silhouette visualized   I have personally reviewed the ECst degree AV-block, prolonged Qtc

## 2023-07-03 NOTE — H&P ADULT - TIME-BASED
pt received at intake rm 2 AAO x 3. pt reports intermittent upper abdominal cramping since Thursday. pt states cramping is worse at night. pt denies sob, chest pain, n/v/d, fevers, chills, cough. respirations even and unlabored. 20g iv placed to left ac. labs drawn and sent. will continue to monitor. 55

## 2023-07-03 NOTE — H&P ADULT - HISTORY OF PRESENT ILLNESS
89F with PMhx of HTN, HLD, CKD IV, anemia, recent admit in April 2023 left hip fracture status post repair (On Eliquis for DVT PPx) and subsequent rehab admission, presents with shortness of breath and generalized weakness x2 days states that shortness of breath is worse on exertion. Denies chest pain fevers, chills, cough, HA, dizziness, syncope, chest palpitations, abd pain, n/v/d, urinary or bowel changes, active sites of bleeding or any other symptoms at this time.  Patient ambulates with a cane since discharge.

## 2023-07-03 NOTE — H&P ADULT - PROBLEM SELECTOR PLAN 1
presents with shortness of breath, likely due to CHF exacerbation, r/o PE/DVT in setting of recent surgery/immobilization. CXR: negative for pleural effusion, consolidation. pro-BNP elevated. Trop 109-> 116. ECG with 1st degree AV block with QTC prolongation     -s/p Lasix 40mg IVP 1x in ED   -HOLD nephrotoxin agents in the setting of KAYLA, can give Bumex 1mg IVP for fluid overload  -F/u Doppler venous of b/l LE to r/o DVT. Taking Eliquis for DVT ppx post-op   -F/u with ECHO TTE to r/o wall motion abnormalities, valvular dysfunction, EF   -Tele monitoring   -Correct electrolytes   -Treat underlying infection, UTI   -PT eval presents with shortness of breath,  worse with exertion, likely due to CHF exacerbation, r/o PE/DVT in setting of recent surgery/immobilization. CXR: negative for pleural effusion, consolidation. pro-BNP elevated. Trop 109-> 116. ECG with 1st degree AV block with QTC prolongation     -s/p Lasix 40mg IVP 1x in ED   -HOLD nephrotoxin agents in the setting of KAYLA, can give Bumex 1mg IVP for fluid overload  -F/u Doppler venous of b/l LE to r/o DVT. Taking Eliquis for DVT ppx post-op   -F/u with ECHO TTE to r/o wall motion abnormalities, valvular dysfunction, EF   -Tele monitoring   -Correct electrolytes   -Treat underlying infection, UTI   -PT eval

## 2023-07-03 NOTE — H&P ADULT - NSHPPHYSICALEXAM_GEN_ALL_CORE
Vital Signs Last 24 Hrs  T(C): 36.6 (03 Jul 2023 06:33), Max: 37.1 (03 Jul 2023 05:38)  T(F): 97.8 (03 Jul 2023 06:33), Max: 98.7 (03 Jul 2023 05:38)  HR: 70 (03 Jul 2023 06:33) (63 - 71)  BP: 163/81 (03 Jul 2023 06:33) (122/78 - 163/81)  BP(mean): --  RR: 18 (03 Jul 2023 06:33) (17 - 19)  SpO2: 96% (03 Jul 2023 06:33) (94% - 99%)    Parameters below as of 03 Jul 2023 06:33  Patient On (Oxygen Delivery Method): room air        CONSTITUTIONAL: Well-groomed, in no apparent distress  EYES: No conjunctival or scleral injection, non-icteric; PERRLA and symmetric  ENMT: oral mucosa with moist membranes  NECK: Trachea midline without palpable neck mass  RESPIRATORY: Breathing comfortably; no dullness to percussion; lungs CTA without wheeze/rhonchi/rales  CARDIOVASCULAR: +S1S2, RRR, no M/G/R; no carotid bruits; pedal pulses full and symmetric; no lower extremity edema  GASTROINTESTINAL: No palpable masses or tenderness, +BS throughout, no rebound/guarding; no hepatosplenomegaly; no hernia palpated  MUSCULOSKELETAL: Normal gait and station;  normal strength and tone of extremities  SKIN: No rashes or ulcers noted; no subcutaneous nodules or induration palpable  NEUROLOGIC: CN II-XII intact; normal reflexes in upper and lower extremities; sensation intact in LEs b/l to light touch  PSYCHIATRIC: A+O x 3; mood and affect appropriate; appropriate insight and judgment

## 2023-07-03 NOTE — H&P ADULT - NSHPREVIEWOFSYSTEMS_GEN_ALL_CORE

## 2023-07-03 NOTE — H&P ADULT - PROBLEM SELECTOR PLAN 3
unclear if systolic, diastolic. No prior ECHO in chart   -HOLD nephrotoxin agents in the setting of KAYLA, can give Bumex 1mg IVP for fluid overload  - C/w carvedilol, HOLD torsemide  - Monitor I/O, daily intake, low salt diet   - F/u on ECHO TTE unclear if systolic, diastolic. No prior ECHO in chart. Euvolemic on exam.  -HOLD nephrotoxin agents in the setting of KAYLA, can give Bumex 1mg IVP for fluid overload  - C/w carvedilol, HOLD torsemide  - Monitor I/O, daily intake, low salt diet   - F/u on ECHO TTE

## 2023-07-03 NOTE — H&P ADULT - CONVERSATION DETAILS
Discussed overall goals of care including advanced directives with patient. During this dicussion we reviewed the current diagnosis, treatment plan, and likely prognosis. An explanation of advanced directives and MOLST was provided Patient wishes to be DNR with trial of intubation if necessary. She also requested to speak to her son, Rock Hoskins [phone] to confirm. Son also states patient is DNR with trial of intubation.

## 2023-07-03 NOTE — PATIENT PROFILE ADULT - FALL HARM RISK - HARM RISK INTERVENTIONS

## 2023-07-03 NOTE — H&P ADULT - PROBLEM SELECTOR PLAN 4
Cr of 2.52, discharged on 1.98, CKDIV, baseline creatinine 1.5 range    -Hold nephrotoxin agents, patient is on Torsemide at home   -F/u with kidney/bladder U/S  -Bladder scan Q6 for retention   -Treat underlying UTI   -Nephrology consulted

## 2023-07-03 NOTE — H&P ADULT - ASSESSMENT
89F with PMhx of HTN, HLD, CKD IV recent admit in April 2023 left hip fracture status post repair (On Eliquis for DVT PPx) and subsequent rehab admission, presents with shortness of breath, likely due to CHF exacerbation, r/o PE/DVT in setting of recent surgery/immobilization.

## 2023-07-04 DIAGNOSIS — I82.409 ACUTE EMBOLISM AND THROMBOSIS OF UNSPECIFIED DEEP VEINS OF UNSPECIFIED LOWER EXTREMITY: ICD-10-CM

## 2023-07-04 DIAGNOSIS — Z29.9 ENCOUNTER FOR PROPHYLACTIC MEASURES, UNSPECIFIED: ICD-10-CM

## 2023-07-04 LAB
ALBUMIN SERPL ELPH-MCNC: 3.2 G/DL — LOW (ref 3.3–5)
ALP SERPL-CCNC: 87 U/L — SIGNIFICANT CHANGE UP (ref 40–120)
ALT FLD-CCNC: 36 U/L — SIGNIFICANT CHANGE UP (ref 10–45)
ANION GAP SERPL CALC-SCNC: 17 MMOL/L — SIGNIFICANT CHANGE UP (ref 5–17)
APTT BLD: 102.1 SEC — HIGH (ref 27.5–35.5)
APTT BLD: 60.3 SEC — HIGH (ref 27.5–35.5)
AST SERPL-CCNC: 32 U/L — SIGNIFICANT CHANGE UP (ref 10–40)
BILIRUB SERPL-MCNC: 0.4 MG/DL — SIGNIFICANT CHANGE UP (ref 0.2–1.2)
BUN SERPL-MCNC: 59 MG/DL — HIGH (ref 7–23)
CALCIUM SERPL-MCNC: 9.3 MG/DL — SIGNIFICANT CHANGE UP (ref 8.4–10.5)
CHLORIDE SERPL-SCNC: 101 MMOL/L — SIGNIFICANT CHANGE UP (ref 96–108)
CO2 SERPL-SCNC: 21 MMOL/L — LOW (ref 22–31)
CREAT SERPL-MCNC: 2.13 MG/DL — HIGH (ref 0.5–1.3)
EGFR: 21 ML/MIN/1.73M2 — LOW
GLUCOSE SERPL-MCNC: 109 MG/DL — HIGH (ref 70–99)
HCT VFR BLD CALC: 28.8 % — LOW (ref 34.5–45)
HGB BLD-MCNC: 9.2 G/DL — LOW (ref 11.5–15.5)
MCHC RBC-ENTMCNC: 29.8 PG — SIGNIFICANT CHANGE UP (ref 27–34)
MCHC RBC-ENTMCNC: 31.9 GM/DL — LOW (ref 32–36)
MCV RBC AUTO: 93.2 FL — SIGNIFICANT CHANGE UP (ref 80–100)
NRBC # BLD: 0 /100 WBCS — SIGNIFICANT CHANGE UP (ref 0–0)
PLATELET # BLD AUTO: 128 K/UL — LOW (ref 150–400)
POTASSIUM SERPL-MCNC: 4 MMOL/L — SIGNIFICANT CHANGE UP (ref 3.5–5.3)
POTASSIUM SERPL-SCNC: 4 MMOL/L — SIGNIFICANT CHANGE UP (ref 3.5–5.3)
PROT SERPL-MCNC: 6.8 G/DL — SIGNIFICANT CHANGE UP (ref 6–8.3)
RBC # BLD: 3.09 M/UL — LOW (ref 3.8–5.2)
RBC # FLD: 14.5 % — SIGNIFICANT CHANGE UP (ref 10.3–14.5)
SODIUM SERPL-SCNC: 139 MMOL/L — SIGNIFICANT CHANGE UP (ref 135–145)
T4 FREE SERPL-MCNC: 1.4 NG/DL — SIGNIFICANT CHANGE UP (ref 0.9–1.8)
WBC # BLD: 7.58 K/UL — SIGNIFICANT CHANGE UP (ref 3.8–10.5)
WBC # FLD AUTO: 7.58 K/UL — SIGNIFICANT CHANGE UP (ref 3.8–10.5)

## 2023-07-04 PROCEDURE — 99232 SBSQ HOSP IP/OBS MODERATE 35: CPT

## 2023-07-04 RX ADMIN — HEPARIN SODIUM 1000 UNIT(S)/HR: 5000 INJECTION INTRAVENOUS; SUBCUTANEOUS at 08:59

## 2023-07-04 RX ADMIN — Medication 1 DROP(S): at 05:11

## 2023-07-04 RX ADMIN — CEFTRIAXONE 100 MILLIGRAM(S): 500 INJECTION, POWDER, FOR SOLUTION INTRAMUSCULAR; INTRAVENOUS at 13:27

## 2023-07-04 RX ADMIN — HEPARIN SODIUM 1100 UNIT(S)/HR: 5000 INJECTION INTRAVENOUS; SUBCUTANEOUS at 07:29

## 2023-07-04 RX ADMIN — Medication 100 MILLIGRAM(S): at 05:10

## 2023-07-04 RX ADMIN — Medication 1 DROP(S): at 17:25

## 2023-07-04 RX ADMIN — HEPARIN SODIUM 1000 UNIT(S)/HR: 5000 INJECTION INTRAVENOUS; SUBCUTANEOUS at 19:10

## 2023-07-04 RX ADMIN — CARVEDILOL PHOSPHATE 25 MILLIGRAM(S): 80 CAPSULE, EXTENDED RELEASE ORAL at 17:25

## 2023-07-04 RX ADMIN — BRIMONIDINE TARTRATE 1 DROP(S): 2 SOLUTION/ DROPS OPHTHALMIC at 17:25

## 2023-07-04 RX ADMIN — SENNA PLUS 2 TABLET(S): 8.6 TABLET ORAL at 21:08

## 2023-07-04 RX ADMIN — ATORVASTATIN CALCIUM 40 MILLIGRAM(S): 80 TABLET, FILM COATED ORAL at 21:08

## 2023-07-04 RX ADMIN — CARVEDILOL PHOSPHATE 25 MILLIGRAM(S): 80 CAPSULE, EXTENDED RELEASE ORAL at 05:10

## 2023-07-04 RX ADMIN — AMLODIPINE BESYLATE 10 MILLIGRAM(S): 2.5 TABLET ORAL at 05:11

## 2023-07-04 RX ADMIN — Medication 325 MILLIGRAM(S): at 11:46

## 2023-07-04 RX ADMIN — HEPARIN SODIUM 1000 UNIT(S)/HR: 5000 INJECTION INTRAVENOUS; SUBCUTANEOUS at 18:24

## 2023-07-04 RX ADMIN — BRIMONIDINE TARTRATE 1 DROP(S): 2 SOLUTION/ DROPS OPHTHALMIC at 05:11

## 2023-07-04 RX ADMIN — PANTOPRAZOLE SODIUM 40 MILLIGRAM(S): 20 TABLET, DELAYED RELEASE ORAL at 05:10

## 2023-07-04 RX ADMIN — Medication 1 TABLET(S): at 11:46

## 2023-07-04 NOTE — PROGRESS NOTE ADULT - PROBLEM SELECTOR PLAN 5
Cr of 2.52, discharged on 1.98, CKDIV, baseline creatinine 1.5 range    -Hold nephrotoxin agents, patient is on Torsemide at home   -F/u with kidney/bladder U/S  -Bladder scan Q6 for retention   -Treat underlying UTI   -Nephrology consulted Cr of 2.52, discharged on 1.98, CKDIV, baseline creatinine 1.5 range    -Hold nephrotoxin agents, patient is on Torsemide at home   - renal US neg for hydro; signs of parenchymal disease   -Bladder scan Q6 for retention   -Treat underlying UTI   -Nephrology consulted

## 2023-07-04 NOTE — PROGRESS NOTE ADULT - SUBJECTIVE AND OBJECTIVE BOX
[facility] Division of Hospital Medicine  Tristin Zeng MD  Available via MS Teams    SUBJECTIVE / OVERNIGHT EVENTS: No acute events overnight. Patient feeling well this morning. Denies any pain in legs. Denies any chest pain. SOB resolved. Denies dysuria. No other concerns or complaints at this time.     Review of Systems:   CONSTITUTIONAL: No fever   EYES: No eye pain, visual disturbances, or discharge  ENMT: No difficulty hearing   RESPIRATORY: No SOB. No cough   CARDIOVASCULAR: No chest pain   GASTROINTESTINAL: No abdominal or epigastric pain. No nausea, vomiting, or hematemesis; No diarrhea   GENITOURINARY: No dysuria   NEUROLOGICAL: No headaches   SKIN: No itching    MUSCULOSKELETAL: No joint pain or swelling; No muscle or back pain  PSYCHIATRIC: No depression or anxiety  HEME/LYMPH: No easy bruising or bleeding gums    MEDICATIONS  (STANDING):  amLODIPine   Tablet 10 milliGRAM(s) Oral daily  atorvastatin 40 milliGRAM(s) Oral at bedtime  brimonidine 0.2% Ophthalmic Solution 1 Drop(s) Both EYES two times a day  calcium carbonate 1250 mG  + Vitamin D (OsCal 500 + D) 1 Tablet(s) Oral daily  carvedilol 25 milliGRAM(s) Oral every 12 hours  cefTRIAXone   IVPB 1000 milliGRAM(s) IV Intermittent every 24 hours  ferrous    sulfate 325 milliGRAM(s) Oral daily  heparin  Infusion.  Unit(s)/Hr (11 mL/Hr) IV Continuous <Continuous>  hydrALAZINE 100 milliGRAM(s) Oral three times a day  pantoprazole    Tablet 40 milliGRAM(s) Oral before breakfast  senna 2 Tablet(s) Oral at bedtime  timolol 0.5% Solution 1 Drop(s) Both EYES two times a day    MEDICATIONS  (PRN):  acetaminophen     Tablet .. 650 milliGRAM(s) Oral every 6 hours PRN Temp greater or equal to 38C (100.4F), Mild Pain (1 - 3)  aluminum hydroxide/magnesium hydroxide/simethicone Suspension 30 milliLiter(s) Oral every 4 hours PRN Dyspepsia  bisacodyl Suppository 10 milliGRAM(s) Rectal daily PRN Constipation  melatonin 3 milliGRAM(s) Oral at bedtime PRN Insomnia  polyethylene glycol 3350 17 Gram(s) Oral daily PRN Constipation  traMADol 25 milliGRAM(s) Oral every 6 hours PRN Moderate Pain (4 - 6)  traMADol 50 milliGRAM(s) Oral every 6 hours PRN Severe Pain (7 - 10)      I&O's Summary    03 Jul 2023 07:01  -  04 Jul 2023 07:00  --------------------------------------------------------  IN: 721 mL / OUT: 400 mL / NET: 321 mL    04 Jul 2023 07:01  -  04 Jul 2023 13:56  --------------------------------------------------------  IN: 240 mL / OUT: 0 mL / NET: 240 mL        PHYSICAL EXAM:  Vital Signs Last 24 Hrs  T(C): 36.3 (04 Jul 2023 11:59), Max: 36.6 (04 Jul 2023 05:23)  T(F): 97.3 (04 Jul 2023 11:59), Max: 97.8 (04 Jul 2023 05:23)  HR: 59 (04 Jul 2023 11:59) (59 - 70)  BP: 108/57 (04 Jul 2023 11:59) (101/54 - 132/68)  RR: 18 (04 Jul 2023 11:59) (16 - 18)  SpO2: 96% (04 Jul 2023 11:59) (94% - 96%)    Parameters below as of 04 Jul 2023 11:59  Patient On (Oxygen Delivery Method): room air      CONSTITUTIONAL: NAD, well-developed   EYES: PERRLA; conjunctiva and sclera clear  ENMT: Moist oral mucosa, no pharyngeal injection or exudates   NECK: Supple   RESPIRATORY: Normal respiratory effort; lungs are clear to auscultation bilaterally  CARDIOVASCULAR: Regular rate and rhythm, normal S1 and S2, no murmur   ABDOMEN: Nontender to palpation, normoactive bowel sounds   MUSCULOSKELETAL: no clubbing or cyanosis of digits; no joint swelling or tenderness to palpation  PSYCH: A+O to person, place, and time; affect appropriate  NEUROLOGY: no gross sensory deficits   SKIN: No rashes     LABS:                        9.2    7.58  )-----------( 128      ( 04 Jul 2023 07:07 )             28.8     07-04    139  |  101  |  59<H>  ----------------------------<  109<H>  4.0   |  21<L>  |  2.13<H>    Ca    9.3      04 Jul 2023 07:05    TPro  6.8  /  Alb  3.2<L>  /  TBili  0.4  /  DBili  x   /  AST  32  /  ALT  36  /  AlkPhos  87  07-04    PT/INR - ( 02 Jul 2023 22:53 )   PT: 14.7 sec;   INR: 1.28 ratio         PTT - ( 04 Jul 2023 07:07 )  PTT:102.1 sec      Urinalysis Basic - ( 04 Jul 2023 07:05 )    Color: x / Appearance: x / SG: x / pH: x  Gluc: 109 mg/dL / Ketone: x  / Bili: x / Urobili: x   Blood: x / Protein: x / Nitrite: x   Leuk Esterase: x / RBC: x / WBC x   Sq Epi: x / Non Sq Epi: x / Bacteria: x        Culture - Urine (collected 03 Jul 2023 00:24)  Source: Clean Catch Clean Catch (Midstream)  Preliminary Report (04 Jul 2023 10:11):    >100,000 CFU/ml Gram Negative Rods      SARS-CoV-2: NotDetec (03 Jul 2023 00:24)  COVID-19 PCR: NotDetec (17 Apr 2023 06:36)  COVID-19 PCR: NotDetec (14 Apr 2023 01:21)      RADIOLOGY & ADDITIONAL TESTS:  New Imaging Personally Reviewed Today:  < from: US Kidney and Bladder (07.03.23 @ 14:50) >    IMPRESSION:    Renal parenchymal disease. No hydronephrosis. Bilateral renal cysts.    Layering debris posteriorly. Difficult to exclude mild posterior wall   thickening. Please correlate with urinalysis.    < end of copied text >  < from: VA Duplex Lower Ext Vein Scan, Bilat (07.03.23 @ 14:20) >  IMPRESSION:    There is bilateral acute above the knee DVT.    On the right, there is partially occlusive DVT affecting the right   popliteal vein and the right posterior tibial peroneal trunk.    On the left, there is occlusive DVT affecting the posterior tibial   peroneal trunk below the knee and the left popliteal, femoral and common   femoral veins above the knee.    < end of copied text >    New Electrocardiogram Personally Reviewed Today:  Other Results Reviewed Today:   Prior or Outpatient Records Reviewed Today with Summary:    COORDINATION OF CARE:  Consultant Communication and Details of Discussion (where applicable):

## 2023-07-04 NOTE — PROGRESS NOTE ADULT - PROBLEM SELECTOR PLAN 3
UA positive   -C/w CFTX   -F/u with UClx and Bclx   -Prior UClx negative, no suspected organism UA positive   -C/w CTX   -F/u with Ucx and Bclx   -Prior Ucx negative, no suspected organism

## 2023-07-04 NOTE — PROGRESS NOTE ADULT - PROBLEM SELECTOR PLAN 7
S/P an Open reduction and internal fixation of the left hip from April 14 2023    -C/w Tramadol PRN for moderate/severe pain (taking it at home of discharge)  -C/w Eliquis 2.5 mg BID for 6 weeks for post-operative DVT PPx   -PT eval S/P an Open reduction and internal fixation of the left hip from April 14 2023    -C/w Tramadol PRN for moderate/severe pain (taking it at home of discharge)  - was on Eliquis 2.5 mg BID for 6 weeks for post-operative DVT PPx; on heparin drip for now due to DVTs noted bilaterally   -PT eval

## 2023-07-04 NOTE — PHYSICAL THERAPY INITIAL EVALUATION ADULT - PERTINENT HX OF CURRENT PROBLEM, REHAB EVAL
89 y.old female with PMhx of HTN, HLD, CKD IV, anemia, recent admit in April 2023 left hip fracture status post repair, s/p left femoral neck fx, hip hemiarthroplasty 4/14 (On Eliquis for DVT PPx) and subsequent rehab admission, presents with shortness of breath and generalized weakness x2 days states that shortness of breath is worse on exertion. Pt with BLE DVT, started on heparin infusion 7/3/23 at 15:00. PT to see 24 hours after anticoagulation.    VA duplex BLE 7/3:  bilateral acute above the knee DVT. On the right, there is partially occlusive DVT affecting the right popliteal vein and the right posterior tibial peroneal trunk. On the left, there is occlusive DVT affecting the posterior tibial peroneal trunk below the knee and the left popliteal, femoral and common femoral veins above the knee.    US kidney and bladder 7/3: Renal parenchymal disease. No hydronephrosis. Bilateral renal cysts. Layering debris posteriorly. Difficult to exclude mild posterior wall thickening. Please correlate with urinalysis.    CXR: Clear lungs. 89 y.old female with PMhx of HTN, HLD, CKD IV, anemia, recent admit in April 2023 left hip fracture status post repair, s/p left femoral neck fx, hip hemiarthroplasty 4/14 (On Eliquis for DVT PPx) and subsequent rehab admission, presents with shortness of breath and generalized weakness x2 days states that shortness of breath is worse on exertion. Pt with BLE DVT, started on heparin infusion 7/3/23 at 15:00. PT to see 24 hours after anticoagulation.    VA duplex BLE 7/3:  bilateral acute above the knee DVT. On the right, there is partially occlusive DVT affecting the right popliteal vein and the right posterior tibial peroneal trunk. On the left, there is occlusive DVT affecting the posterior tibial peroneal trunk below the knee and the left popliteal, femoral and common femoral veins above the knee.          US kidney and bladder 7/3: Renal parenchymal disease. No hydronephrosis. Bilateral renal cysts. Layering debris posteriorly. Difficult to exclude mild posterior wall thickening. Please correlate with urinalysis.    CXR: Clear lungs.

## 2023-07-04 NOTE — PHYSICAL THERAPY INITIAL EVALUATION ADULT - ADDITIONAL COMMENTS
Per CM Note: Pt resides with caregiver/son (Gato Wilkerson [phone]). Pt states ambulates with a rolling walker at home and cane with 1 assist to use stairs, own a shower chair. Per CM Note: Pt resides with caregiver/son (Gato Wilkerson [phone]). Pt states ambulates with a rolling walker at home and cane with 1 assist to use stairs, own a shower chair. Pt lives in 2 floor house w/ 3 steps to enter, 12 steps inside. Pt can function on one level w/ son able to assist as needed.  Pt owns RW, SC, shower stool, and no other DMEs. No HHA services.

## 2023-07-04 NOTE — PROGRESS NOTE ADULT - PROBLEM SELECTOR PLAN 1
presents with shortness of breath,  worse with exertion, likely due to CHF exacerbation, r/o PE/DVT in setting of recent surgery/immobilization. CXR: negative for pleural effusion, consolidation. pro-BNP elevated. Trop 109-> 116. ECG with 1st degree AV block with QTC prolongation     -s/p Lasix 40mg IVP 1x in ED   -HOLD nephrotoxin agents in the setting of KAYLA, can give Bumex 1mg IVP for fluid overload  -F/u Doppler venous of b/l LE to r/o DVT. Taking Eliquis for DVT ppx post-op   -F/u with ECHO TTE to r/o wall motion abnormalities, valvular dysfunction, EF   -Tele monitoring   -Correct electrolytes   -Treat underlying infection, UTI   -PT eval presents with shortness of breath,  worse with exertion, likely due to CHF exacerbation, r/o PE/DVT in setting of recent surgery/immobilization. CXR: negative for pleural effusion, consolidation. pro-BNP elevated. Trop 109-> 116. ECG with 1st degree AV block with QTC prolongation     -s/p Lasix 40mg IVP 1x in ED   -HOLD nephrotoxin agents in the setting of KAYLA, can give Bumex 1mg IVP for fluid overload  -Doppler venous b/l LE noted to show acute bilateral above knee DVT; Taking Eliquis for DVT ppx post-op. continue with heparin drip for now; will hold off on CTA for now since with elevated creatinine level   -F/u with ECHO TTE to r/o wall motion abnormalities, valvular dysfunction, EF   -Tele monitoring   -Correct electrolytes   -Treat underlying infection, UTI   -PT eval pending  - trend troponins  - monitor O2 sats; currently on room air and saturating well

## 2023-07-04 NOTE — PHYSICAL THERAPY INITIAL EVALUATION ADULT - PATIENT/FAMILY AGREES WITH PLAN
Subacute Rehab; if home, pt would require assist with ALL ADL's and functional mobility and home PT. Pt would require assistance to get into home./yes

## 2023-07-05 DIAGNOSIS — I50.33 ACUTE ON CHRONIC DIASTOLIC (CONGESTIVE) HEART FAILURE: ICD-10-CM

## 2023-07-05 LAB
-  AMIKACIN: SIGNIFICANT CHANGE UP
-  AMOXICILLIN/CLAVULANIC ACID: SIGNIFICANT CHANGE UP
-  AMPICILLIN/SULBACTAM: SIGNIFICANT CHANGE UP
-  AMPICILLIN: SIGNIFICANT CHANGE UP
-  AZTREONAM: SIGNIFICANT CHANGE UP
-  CEFAZOLIN: SIGNIFICANT CHANGE UP
-  CEFEPIME: SIGNIFICANT CHANGE UP
-  CEFOXITIN: SIGNIFICANT CHANGE UP
-  CEFTRIAXONE: SIGNIFICANT CHANGE UP
-  CIPROFLOXACIN: SIGNIFICANT CHANGE UP
-  ERTAPENEM: SIGNIFICANT CHANGE UP
-  GENTAMICIN: SIGNIFICANT CHANGE UP
-  IMIPENEM: SIGNIFICANT CHANGE UP
-  LEVOFLOXACIN: SIGNIFICANT CHANGE UP
-  MEROPENEM: SIGNIFICANT CHANGE UP
-  NITROFURANTOIN: SIGNIFICANT CHANGE UP
-  PIPERACILLIN/TAZOBACTAM: SIGNIFICANT CHANGE UP
-  TOBRAMYCIN: SIGNIFICANT CHANGE UP
-  TRIMETHOPRIM/SULFAMETHOXAZOLE: SIGNIFICANT CHANGE UP
ALBUMIN SERPL ELPH-MCNC: 3.3 G/DL — SIGNIFICANT CHANGE UP (ref 3.3–5)
ALP SERPL-CCNC: 84 U/L — SIGNIFICANT CHANGE UP (ref 40–120)
ALT FLD-CCNC: 28 U/L — SIGNIFICANT CHANGE UP (ref 10–45)
ANION GAP SERPL CALC-SCNC: 14 MMOL/L — SIGNIFICANT CHANGE UP (ref 5–17)
APTT BLD: 109.9 SEC — HIGH (ref 27.5–35.5)
APTT BLD: 125.6 SEC — CRITICAL HIGH (ref 27.5–35.5)
APTT BLD: 61.1 SEC — HIGH (ref 27.5–35.5)
APTT BLD: 98.7 SEC — HIGH (ref 27.5–35.5)
AST SERPL-CCNC: 22 U/L — SIGNIFICANT CHANGE UP (ref 10–40)
BILIRUB SERPL-MCNC: 0.4 MG/DL — SIGNIFICANT CHANGE UP (ref 0.2–1.2)
BUN SERPL-MCNC: 53 MG/DL — HIGH (ref 7–23)
CALCIUM SERPL-MCNC: 9 MG/DL — SIGNIFICANT CHANGE UP (ref 8.4–10.5)
CHLORIDE SERPL-SCNC: 102 MMOL/L — SIGNIFICANT CHANGE UP (ref 96–108)
CO2 SERPL-SCNC: 26 MMOL/L — SIGNIFICANT CHANGE UP (ref 22–31)
CREAT SERPL-MCNC: 2.01 MG/DL — HIGH (ref 0.5–1.3)
CULTURE RESULTS: SIGNIFICANT CHANGE UP
CULTURE RESULTS: SIGNIFICANT CHANGE UP
EGFR: 22 ML/MIN/1.73M2 — LOW
GLUCOSE SERPL-MCNC: 111 MG/DL — HIGH (ref 70–99)
HCT VFR BLD CALC: 30.4 % — LOW (ref 34.5–45)
HGB BLD-MCNC: 9.4 G/DL — LOW (ref 11.5–15.5)
MAGNESIUM SERPL-MCNC: 2.3 MG/DL — SIGNIFICANT CHANGE UP (ref 1.6–2.6)
MCHC RBC-ENTMCNC: 29.5 PG — SIGNIFICANT CHANGE UP (ref 27–34)
MCHC RBC-ENTMCNC: 30.9 GM/DL — LOW (ref 32–36)
MCV RBC AUTO: 95.3 FL — SIGNIFICANT CHANGE UP (ref 80–100)
METHOD TYPE: SIGNIFICANT CHANGE UP
NRBC # BLD: 0 /100 WBCS — SIGNIFICANT CHANGE UP (ref 0–0)
NT-PROBNP SERPL-SCNC: HIGH PG/ML (ref 0–300)
ORGANISM # SPEC MICROSCOPIC CNT: SIGNIFICANT CHANGE UP
ORGANISM # SPEC MICROSCOPIC CNT: SIGNIFICANT CHANGE UP
PHOSPHATE SERPL-MCNC: 2.8 MG/DL — SIGNIFICANT CHANGE UP (ref 2.5–4.5)
PLATELET # BLD AUTO: 148 K/UL — LOW (ref 150–400)
POTASSIUM SERPL-MCNC: 3.7 MMOL/L — SIGNIFICANT CHANGE UP (ref 3.5–5.3)
POTASSIUM SERPL-SCNC: 3.7 MMOL/L — SIGNIFICANT CHANGE UP (ref 3.5–5.3)
PROT SERPL-MCNC: 6.7 G/DL — SIGNIFICANT CHANGE UP (ref 6–8.3)
RBC # BLD: 3.19 M/UL — LOW (ref 3.8–5.2)
RBC # FLD: 14.3 % — SIGNIFICANT CHANGE UP (ref 10.3–14.5)
SODIUM SERPL-SCNC: 142 MMOL/L — SIGNIFICANT CHANGE UP (ref 135–145)
SPECIMEN SOURCE: SIGNIFICANT CHANGE UP
SPECIMEN SOURCE: SIGNIFICANT CHANGE UP
TROPONIN T, HIGH SENSITIVITY RESULT: 88 NG/L — HIGH (ref 0–51)
WBC # BLD: 7.28 K/UL — SIGNIFICANT CHANGE UP (ref 3.8–10.5)
WBC # FLD AUTO: 7.28 K/UL — SIGNIFICANT CHANGE UP (ref 3.8–10.5)

## 2023-07-05 PROCEDURE — 99233 SBSQ HOSP IP/OBS HIGH 50: CPT

## 2023-07-05 RX ORDER — POTASSIUM CHLORIDE 20 MEQ
20 PACKET (EA) ORAL ONCE
Refills: 0 | Status: COMPLETED | OUTPATIENT
Start: 2023-07-05 | End: 2023-07-05

## 2023-07-05 RX ADMIN — CARVEDILOL PHOSPHATE 25 MILLIGRAM(S): 80 CAPSULE, EXTENDED RELEASE ORAL at 17:25

## 2023-07-05 RX ADMIN — BRIMONIDINE TARTRATE 1 DROP(S): 2 SOLUTION/ DROPS OPHTHALMIC at 05:03

## 2023-07-05 RX ADMIN — CARVEDILOL PHOSPHATE 25 MILLIGRAM(S): 80 CAPSULE, EXTENDED RELEASE ORAL at 05:02

## 2023-07-05 RX ADMIN — HEPARIN SODIUM 800 UNIT(S)/HR: 5000 INJECTION INTRAVENOUS; SUBCUTANEOUS at 22:26

## 2023-07-05 RX ADMIN — BRIMONIDINE TARTRATE 1 DROP(S): 2 SOLUTION/ DROPS OPHTHALMIC at 17:25

## 2023-07-05 RX ADMIN — HEPARIN SODIUM 800 UNIT(S)/HR: 5000 INJECTION INTRAVENOUS; SUBCUTANEOUS at 07:08

## 2023-07-05 RX ADMIN — HEPARIN SODIUM 800 UNIT(S)/HR: 5000 INJECTION INTRAVENOUS; SUBCUTANEOUS at 19:07

## 2023-07-05 RX ADMIN — Medication 20 MILLIEQUIVALENT(S): at 12:39

## 2023-07-05 RX ADMIN — Medication 325 MILLIGRAM(S): at 12:26

## 2023-07-05 RX ADMIN — HEPARIN SODIUM 800 UNIT(S)/HR: 5000 INJECTION INTRAVENOUS; SUBCUTANEOUS at 13:45

## 2023-07-05 RX ADMIN — ATORVASTATIN CALCIUM 40 MILLIGRAM(S): 80 TABLET, FILM COATED ORAL at 21:47

## 2023-07-05 RX ADMIN — PANTOPRAZOLE SODIUM 40 MILLIGRAM(S): 20 TABLET, DELAYED RELEASE ORAL at 05:01

## 2023-07-05 RX ADMIN — CEFTRIAXONE 100 MILLIGRAM(S): 500 INJECTION, POWDER, FOR SOLUTION INTRAMUSCULAR; INTRAVENOUS at 14:37

## 2023-07-05 RX ADMIN — AMLODIPINE BESYLATE 10 MILLIGRAM(S): 2.5 TABLET ORAL at 05:02

## 2023-07-05 RX ADMIN — HEPARIN SODIUM 800 UNIT(S)/HR: 5000 INJECTION INTRAVENOUS; SUBCUTANEOUS at 06:06

## 2023-07-05 RX ADMIN — Medication 1 DROP(S): at 05:03

## 2023-07-05 RX ADMIN — HEPARIN SODIUM 900 UNIT(S)/HR: 5000 INJECTION INTRAVENOUS; SUBCUTANEOUS at 00:56

## 2023-07-05 RX ADMIN — Medication 1 DROP(S): at 17:26

## 2023-07-05 RX ADMIN — Medication 1 TABLET(S): at 12:26

## 2023-07-05 NOTE — CONSULT NOTE ADULT - SUBJECTIVE AND OBJECTIVE BOX
[facility]  [phone]  NEPHROLOGY      INITIAL CONSULT NOTE  --------------------------------------------------------------------------------  HPI:    89 year old Female with PMhx of HTN, HLD, CKD IV, anemia, recent admit in April 2023 left hip fracture status post repair (On Eliquis for DVT PPx) and subsequent rehab admission, presents with shortness of breath and generalized weakness x2 days states that shortness of breath is worse on exertion. Noticed with abnormal creatinine, renal consult called.     PAST HISTORY  --------------------------------------------------------------------------------  PAST MEDICAL & SURGICAL HISTORY:  Hypertension      No significant past surgical history        FAMILY HISTORY:  No pertinent family history in first degree relatives      PAST SOCIAL HISTORY:  denies tobacco use    ALLERGIES & MEDICATIONS  --------------------------------------------------------------------------------  Allergies    No Known Allergies    Intolerances      Standing Inpatient Medications  amLODIPine   Tablet 10 milliGRAM(s) Oral daily  atorvastatin 40 milliGRAM(s) Oral at bedtime  brimonidine 0.2% Ophthalmic Solution 1 Drop(s) Both EYES two times a day  calcium carbonate 1250 mG  + Vitamin D (OsCal 500 + D) 1 Tablet(s) Oral daily  carvedilol 25 milliGRAM(s) Oral every 12 hours  cefTRIAXone   IVPB 1000 milliGRAM(s) IV Intermittent every 24 hours  ferrous    sulfate 325 milliGRAM(s) Oral daily  heparin  Infusion.  Unit(s)/Hr IV Continuous <Continuous>  hydrALAZINE 100 milliGRAM(s) Oral three times a day  pantoprazole    Tablet 40 milliGRAM(s) Oral before breakfast  senna 2 Tablet(s) Oral at bedtime  timolol 0.5% Solution 1 Drop(s) Both EYES two times a day    PRN Inpatient Medications  acetaminophen     Tablet .. 650 milliGRAM(s) Oral every 6 hours PRN  aluminum hydroxide/magnesium hydroxide/simethicone Suspension 30 milliLiter(s) Oral every 4 hours PRN  bisacodyl Suppository 10 milliGRAM(s) Rectal daily PRN  melatonin 3 milliGRAM(s) Oral at bedtime PRN  polyethylene glycol 3350 17 Gram(s) Oral daily PRN  traMADol 25 milliGRAM(s) Oral every 6 hours PRN  traMADol 50 milliGRAM(s) Oral every 6 hours PRN      REVIEW OF SYSTEMS  --------------------------------------------------------------------------------  Gen: No fevers/chills   Head/Eyes/Ears/Mouth: No headache; Normal hearing;    Respiratory: No dyspnea, cough, wheezing,   CV: No chest pain, orthopnea  GI: No abdominal pain, diarrhea, nausea, vomiting,  : No dysuria, decrease urination   MSK: No joint pain/swelling; no back pain  Neuro: No dizziness/lightheadedness, +weakness,   also with trace edema     VITALS/PHYSICAL EXAM  --------------------------------------------------------------------------------  T(C): 36.4 (07-05-23 @ 12:14), Max: 36.7 (07-05-23 @ 04:12)  HR: 57 (07-05-23 @ 12:14) (53 - 57)  BP: 123/65 (07-05-23 @ 12:14) (102/55 - 139/74)  RR: 18 (07-05-23 @ 12:14) (16 - 18)  SpO2: 97% (07-05-23 @ 12:14) (93% - 97%)  Wt(kg): --        07-04-23 @ 07:01  -  07-05-23 @ 07:00  --------------------------------------------------------  IN: 780 mL / OUT: 300 mL / NET: 480 mL    07-05-23 @ 07:01  -  07-05-23 @ 12:57  --------------------------------------------------------  IN: 240 mL / OUT: 250 mL / NET: -10 mL      Physical Exam:  	Gen: Non toxic comfortable appearing   	Pulm: decrease bs  no rales or ronchi or wheezing  	CV: Mild JVD. RRR, S1S2; no rub  	Back: No CVA tenderness; no sacral edema  	Abd: +BS, soft, nontender/nondistended  	: No suprapubic tenderness  	UE: Warm, no cyanosis  no clubbing,  no edema;  	LE: Warm, no cyanosis  no clubbing, trace edema  	Neuro: alert and oriented. speech coherent   	Skin: Warm, no decrease skin turgor     LABS/STUDIES  --------------------------------------------------------------------------------              9.4    7.28  >-----------<  148      [07-05-23 @ 05:34]              30.4     142  |  102  |  53  ----------------------------<  111      [07-05-23 @ 05:34]  3.7   |  26  |  2.01        Ca     9.0     [07-05-23 @ 05:34]      Mg     2.3     [07-05-23 @ 05:34]      Phos  2.8     [07-05-23 @ 05:34]    TPro  6.7  /  Alb  3.3  /  TBili  0.4  /  DBili  x   /  AST  22  /  ALT  28  /  AlkPhos  84  [07-05-23 @ 05:34]      PTT: 125.6      [07-05-23 @ 05:34]      Creatinine Trend:  SCr 2.01 [07-05 @ 05:34]  SCr 2.13 [07-04 @ 07:05]  SCr 2.64 [07-03 @ 00:24]  SCr 2.52 [07-02 @ 22:53]    Urinalysis - [07-05-23 @ 05:34]      Color  / Appearance  / SG  / pH       Gluc 111 / Ketone   / Bili  / Urobili        Blood  / Protein  / Leuk Est  / Nitrite       RBC  / WBC  / Hyaline  / Gran  / Sq Epi  / Non Sq Epi  / Bacteria       Iron 15, TIBC 152, %sat 10      [04-17-23 @ 05:40]  Ferritin 274      [04-17-23 @ 05:40]  PTH -- (Ca 9.1)      [04-18-23 @ 07:37]   128  TSH 5.03      [07-02-23 @ 22:53]      < from: TTE W or WO Ultrasound Enhancing Agent (07.03.23 @ 12:51) >  TRANSTHORACIC ECHOCARDIOGRAM REPORT  ________________________________________________________________________________                                      _______       Pt. Name:       FRANSISCO LANDERS Study Date:    7/3/2023  MRN:            [id number]    YOB: 1924  Accession #:    [id number]     Age:           89 years  Account#:       [id number]  Gender:        F  Heart Rate:                   Height:        67.00 in (170.18 cm)  Rhythm:                       Weight:        130.00 lb (58.97 kg)  Blood Pressure: 163/81 mmHg   BSA/BMI:       1.68 m² / 20.36 kg/m²  ________________________________________________________________________________________  Referring Physician:    [id number] Narendra Chandler  Interpreting Physician: Luis Enrique Mazariegos M.D.  Primary Sonographer:    Hilaria Reis RDCS    CPT:               ECHO TTE WO CON COMP W DOPP - 63927.m  Indication(s):     Heart failure, unspecified - I50.9  Procedure:         Transthoracic echocardiogram with 2-D, M-mode and complete                     spectral and color flow Doppler.  Ordering Location: [facility]  Study Information: Image quality for this study is fair.    _______________________________________________________________________________________  CONCLUSIONS:     1. Right ventricular volume and pressure overload. The left ventricular systolic function is normal with an ejection fraction of 61 % by Encarnacion's method of disks.   2. Reduced systolic right ventricular function. The tricuspid annular plane systolic excursion (TAPSE) is 1.3 cm (normal >=1.7 cm).   3. Moderate tricuspid regurgitation.   4. Estimated pulmonary artery systolic pressure is 86 mmHg, consistent with severe pulmonary hypertension.   5. Moderate-to-severe aortic regurgitation.   6. The inferior vena cava is dilated measuring 3.65 cm in diameter, (dilated >2.1cm) with abnormal inspiratory collapse (abnormal <50%) consistent with elevated right atrial pressure (~15, range 10-20mmHg).   7. There is normal LV mass and concentric remodeling.    ________________________________________________________________________________________  FINDINGS:     Left Ventricle:  The interventricular septum is flattened in systole and diastole consistent with right ventricular pressure and volume overload. The left ventricular systolic function is normal with a calculated ejection fraction of 61 % by the Encarnacion's biplane method of disks. There is normal LV mass and concentric remodeling.     Right Ventricle:  Moderately enlarged right ventricular cavity size, normal wall thickness and reduced right ventricular systolic function. Tricuspid annular plane systolic excursion (TAPSE) is 1.3 cm (normal >=1.7 cm).     Left Atrium:  The left atrium is normal with an indexed volume of 27.38 ml/m².     Right Atrium:  The right atrium is dilated.     Aortic Valve:  The aortic valve appears trileaflet with normal systolic excursion. There is moderate-to-severe aortic regurgitation.     Mitral Valve:  There is mitral valve thickening of the anterior and posterior leaflets. There is calcification of the mitral valve annulus.     Tricuspid Valve:  Structurally normal tricuspid valve with normal leaflet excursion. There is moderate tricuspid regurgitation. Estimated pulmonary artery systolic pressure is 86 mmHg, consistent with severe pulmonary hypertension.     Pulmonic Valve:  There is mild pulmonic regurgitation.     Aorta:  Borderline dilated proximal ascending aorta, measuring 3.70 cm (indexed 2.20 cm/m²).     Systemic Veins:  The inferior vena cava is dilated measuring 3.65 cm in diameter, (dilated >2.1cm) with abnormal inspiratory collapse (abnormal <50%) consistent with elevated right atrial pressure (~15, range 10-20mmHg).  ____________________________________________________________________  Quantitative Data:  Left Ventricle Measurements: (Indexed to BSA)     IVSd (2D):   1.0 cm  LVPWd (2D):  1.0 cm                           Strain Measurements:  LVIDd (2D):  3.7 cm                           Global Pk Long Strain:  -15.3 %  LVIDs (2D):  2.7 cm                           Endo Pk Strain A4C:     -9.7 %  LV Mass:     111 g  66.0 g/m²                 Endo Pk Strain A2C:     -8.6 %  BiPlane LV EF%: 61 %                          Endo Pk Strain A3C:     -27.6 %     MV E Vmax: 0.66 m/s  MV A Vmax:    0.83 m/s  MV E/A:       0.79  e' lateral:   6.74 cm/s  e' medial:    3.48 cm/s  E/e' lateral: 9.75  E/e' medial:  18.88  E/e' Average: 12.86  MV DT:        229 msec    Aorta Measurements:     Ao Root s, 2D: 3.6 cm  Ao Asc prox:   3.70 cm       Left Atrium Measurements: (Indexed to BSA)  LA Diam 2D: 3.20 cm    Right Ventricle Measurements:     TAPSE:           1.3 cm  TV Fina. S':      6.09 cm/s  RV Base (RVID1): 4.6 cm  RV Mid (RVID2):  3.7 cm       LVOT / RVOT/ Qp/Qs Data: (Indexed to BSA)  LVOT Vmax: 1.16 m/s  LVOT VTI:  23.50 cm    Mitral Valve Measurements:     MV E Vmax: 0.7 m/s  MV A Vmax: 0.8 m/s  MV E/A:    0.8       Tricuspid Valve Measurements:     TR Vmax:          4.2 m/s  TR Peak Gradient: 70.9 mmHg  RA Pressure:      15 mmHg  PASP:             86 mmHg    ________________________________________________________________________________________  Electronically signed on 7/3/2023 at 4:19:05 PM by Luis Enrique Mazariegos M.D.         *** Final ***    < end of copied text >  < from: US Kidney and Bladder (07.03.23 @ 14:50) >    ACC: [id number] EXAM:  US KIDNEYS AND BLADDER   ORDERED BY:  SHAMRIZ   NOHEMI     PROCEDURE DATE:  07/03/2023          INTERPRETATION:  CLINICAL INFORMATION: AK I on CK D. Creatinine 2.64.    COMPARISON: 4/18/2023..    TECHNIQUE: Sonography of the kidneys and bladder.    FINDINGS:  Right kidney: 9.5 cm. No renal mass, hydronephrosis or calculi. Mild   increased parenchymal echogenicity. The upper to midpole cyst measuring 7   mm x 8 mm x 7 mm. There is an upper pole cyst measuring 6 mm x 6 mm x 7   mm. Additional upper pole cyst measuring 1.3 cm x 9 mm x 1.3 cm. There is   a lower pole cyst measuring 5 mm x 6 mm x 6 mm. There is a lower pole   cyst measuring 5 mm x 6 mm x 6 mm.    Left kidney: 8.8 cm. No renal mass, hydronephrosis or calculi. Mild   increased parenchymal echogenicity. There is  an upper pole cyst   measuring 7 mm x 6 mm x 7 mm.    Urinary bladder: There is some layering debris posteriorly. Difficult to   assess movement of this debris with patient turning. Difficult to exclude   mild posterior wall thickening. Please correlate with urinalysis.    IMPRESSION:    Renal parenchymal disease. No hydronephrosis. Bilateral renal cysts.    Layering debris posteriorly. Difficult to exclude mild posterior wall   thickening. Please correlate with urinalysis.            --- End of Report ---          < end of copied text >

## 2023-07-05 NOTE — PROGRESS NOTE ADULT - PROBLEM SELECTOR PLAN 7
S/P an Open reduction and internal fixation of the left hip from April 14 2023  - C/w Tramadol PRN for moderate/severe pain (taking it at home of discharge)  - was on Eliquis 2.5 mg BID for 6 weeks for post-operative for DVT ppx

## 2023-07-05 NOTE — CONSULT NOTE ADULT - NS ATTEND AMEND GEN_ALL_CORE FT
Seen.    formulated plan with and  agree with above as scribed by NP Chantal [Elver]       c/o dysuria sx slightly better   + weakness   lungs decrease bs no rales   heart RRR  ext no edema    1- CKD IV  2- CHF   3- anemia   4- UTI     Maxi on ckd in setting of UTI likely  restart diuretics soon   cont rocephin 1 g daily   check retic ct /iron profile   cont hydralazine and coreg

## 2023-07-05 NOTE — PROGRESS NOTE ADULT - PROBLEM SELECTOR PLAN 5
Cr of 2.52, discharged on 1.98, CKD IV, baseline creatinine 1.5 range  - Hold nephrotoxin agents, patient is on Torsemide at home   - renal US neg for hydro; signs of parenchymal disease   - Bladder scan Q6 for retention   - Treat underlying UTI   - Nephrology consulted, f/u recs

## 2023-07-05 NOTE — PROGRESS NOTE ADULT - SUBJECTIVE AND OBJECTIVE BOX
[facility]  [phone]  NEPHROLOGY      INITIAL CONSULT NOTE  --------------------------------------------------------------------------------  HPI:    89 year old Female with PMhx of HTN, HLD, CKD IV, anemia, recent admit in April 2023 left hip fracture status post repair (On Eliquis for DVT PPx) and subsequent rehab admission, presents with shortness of breath and generalized weakness x2 days states that shortness of breath is worse on exertion. Noticed with abnormal creatinine, renal consult called.     PAST HISTORY  --------------------------------------------------------------------------------  PAST MEDICAL & SURGICAL HISTORY:  Hypertension      No significant past surgical history        FAMILY HISTORY:  No pertinent family history in first degree relatives      PAST SOCIAL HISTORY:  denies tobacco use    ALLERGIES & MEDICATIONS  --------------------------------------------------------------------------------  Allergies    No Known Allergies    Intolerances      Standing Inpatient Medications  amLODIPine   Tablet 10 milliGRAM(s) Oral daily  atorvastatin 40 milliGRAM(s) Oral at bedtime  brimonidine 0.2% Ophthalmic Solution 1 Drop(s) Both EYES two times a day  calcium carbonate 1250 mG  + Vitamin D (OsCal 500 + D) 1 Tablet(s) Oral daily  carvedilol 25 milliGRAM(s) Oral every 12 hours  cefTRIAXone   IVPB 1000 milliGRAM(s) IV Intermittent every 24 hours  ferrous    sulfate 325 milliGRAM(s) Oral daily  heparin  Infusion.  Unit(s)/Hr IV Continuous <Continuous>  hydrALAZINE 100 milliGRAM(s) Oral three times a day  pantoprazole    Tablet 40 milliGRAM(s) Oral before breakfast  senna 2 Tablet(s) Oral at bedtime  timolol 0.5% Solution 1 Drop(s) Both EYES two times a day    PRN Inpatient Medications  acetaminophen     Tablet .. 650 milliGRAM(s) Oral every 6 hours PRN  aluminum hydroxide/magnesium hydroxide/simethicone Suspension 30 milliLiter(s) Oral every 4 hours PRN  bisacodyl Suppository 10 milliGRAM(s) Rectal daily PRN  melatonin 3 milliGRAM(s) Oral at bedtime PRN  polyethylene glycol 3350 17 Gram(s) Oral daily PRN  traMADol 25 milliGRAM(s) Oral every 6 hours PRN  traMADol 50 milliGRAM(s) Oral every 6 hours PRN      REVIEW OF SYSTEMS  --------------------------------------------------------------------------------  Gen: No fevers/chills   Head/Eyes/Ears/Mouth: No headache; Normal hearing;    Respiratory: No dyspnea, cough, wheezing,   CV: No chest pain, orthopnea  GI: No abdominal pain, diarrhea, nausea, vomiting,  : No dysuria, decrease urination   MSK: No joint pain/swelling; no back pain  Neuro: No dizziness/lightheadedness, +weakness,   also with trace edema     VITALS/PHYSICAL EXAM  --------------------------------------------------------------------------------  T(C): 36.4 (07-05-23 @ 12:14), Max: 36.7 (07-05-23 @ 04:12)  HR: 57 (07-05-23 @ 12:14) (53 - 57)  BP: 123/65 (07-05-23 @ 12:14) (102/55 - 139/74)  RR: 18 (07-05-23 @ 12:14) (16 - 18)  SpO2: 97% (07-05-23 @ 12:14) (93% - 97%)  Wt(kg): --        07-04-23 @ 07:01  -  07-05-23 @ 07:00  --------------------------------------------------------  IN: 780 mL / OUT: 300 mL / NET: 480 mL    07-05-23 @ 07:01  -  07-05-23 @ 12:57  --------------------------------------------------------  IN: 240 mL / OUT: 250 mL / NET: -10 mL      Physical Exam:  	Gen: Non toxic comfortable appearing   	Pulm: decrease bs  no rales or ronchi or wheezing  	CV: Mild JVD. RRR, S1S2; no rub  	Back: No CVA tenderness; no sacral edema  	Abd: +BS, soft, nontender/nondistended  	: No suprapubic tenderness  	UE: Warm, no cyanosis  no clubbing,  no edema;  	LE: Warm, no cyanosis  no clubbing, trace edema  	Neuro: alert and oriented. speech coherent   	Skin: Warm, no decrease skin turgor     LABS/STUDIES  --------------------------------------------------------------------------------              9.4    7.28  >-----------<  148      [07-05-23 @ 05:34]              30.4     142  |  102  |  53  ----------------------------<  111      [07-05-23 @ 05:34]  3.7   |  26  |  2.01        Ca     9.0     [07-05-23 @ 05:34]      Mg     2.3     [07-05-23 @ 05:34]      Phos  2.8     [07-05-23 @ 05:34]    TPro  6.7  /  Alb  3.3  /  TBili  0.4  /  DBili  x   /  AST  22  /  ALT  28  /  AlkPhos  84  [07-05-23 @ 05:34]      PTT: 125.6      [07-05-23 @ 05:34]      Creatinine Trend:  SCr 2.01 [07-05 @ 05:34]  SCr 2.13 [07-04 @ 07:05]  SCr 2.64 [07-03 @ 00:24]  SCr 2.52 [07-02 @ 22:53]    Urinalysis - [07-05-23 @ 05:34]      Color  / Appearance  / SG  / pH       Gluc 111 / Ketone   / Bili  / Urobili        Blood  / Protein  / Leuk Est  / Nitrite       RBC  / WBC  / Hyaline  / Gran  / Sq Epi  / Non Sq Epi  / Bacteria       Iron 15, TIBC 152, %sat 10      [04-17-23 @ 05:40]  Ferritin 274      [04-17-23 @ 05:40]  PTH -- (Ca 9.1)      [04-18-23 @ 07:37]   128  TSH 5.03      [07-02-23 @ 22:53]      < from: TTE W or WO Ultrasound Enhancing Agent (07.03.23 @ 12:51) >  TRANSTHORACIC ECHOCARDIOGRAM REPORT  ________________________________________________________________________________                                      _______       Pt. Name:       FRANSISCO LANDERS Study Date:    7/3/2023  MRN:            [id number]    YOB: 1924  Accession #:    [id number]     Age:           89 years  Account#:       [id number]  Gender:        F  Heart Rate:                   Height:        67.00 in (170.18 cm)  Rhythm:                       Weight:        130.00 lb (58.97 kg)  Blood Pressure: 163/81 mmHg   BSA/BMI:       1.68 m² / 20.36 kg/m²  ________________________________________________________________________________________  Referring Physician:    [id number] Narendra Chandler  Interpreting Physician: Luis Enrique Mazariegos M.D.  Primary Sonographer:    Hilaria Reis RDCS    CPT:               ECHO TTE WO CON COMP W DOPP - 24788.m  Indication(s):     Heart failure, unspecified - I50.9  Procedure:         Transthoracic echocardiogram with 2-D, M-mode and complete                     spectral and color flow Doppler.  Ordering Location: [facility]  Study Information: Image quality for this study is fair.    _______________________________________________________________________________________  CONCLUSIONS:     1. Right ventricular volume and pressure overload. The left ventricular systolic function is normal with an ejection fraction of 61 % by Encarnacion's method of disks.   2. Reduced systolic right ventricular function. The tricuspid annular plane systolic excursion (TAPSE) is 1.3 cm (normal >=1.7 cm).   3. Moderate tricuspid regurgitation.   4. Estimated pulmonary artery systolic pressure is 86 mmHg, consistent with severe pulmonary hypertension.   5. Moderate-to-severe aortic regurgitation.   6. The inferior vena cava is dilated measuring 3.65 cm in diameter, (dilated >2.1cm) with abnormal inspiratory collapse (abnormal <50%) consistent with elevated right atrial pressure (~15, range 10-20mmHg).   7. There is normal LV mass and concentric remodeling.    ________________________________________________________________________________________  FINDINGS:     Left Ventricle:  The interventricular septum is flattened in systole and diastole consistent with right ventricular pressure and volume overload. The left ventricular systolic function is normal with a calculated ejection fraction of 61 % by the Encarnacion's biplane method of disks. There is normal LV mass and concentric remodeling.     Right Ventricle:  Moderately enlarged right ventricular cavity size, normal wall thickness and reduced right ventricular systolic function. Tricuspid annular plane systolic excursion (TAPSE) is 1.3 cm (normal >=1.7 cm).     Left Atrium:  The left atrium is normal with an indexed volume of 27.38 ml/m².     Right Atrium:  The right atrium is dilated.     Aortic Valve:  The aortic valve appears trileaflet with normal systolic excursion. There is moderate-to-severe aortic regurgitation.     Mitral Valve:  There is mitral valve thickening of the anterior and posterior leaflets. There is calcification of the mitral valve annulus.     Tricuspid Valve:  Structurally normal tricuspid valve with normal leaflet excursion. There is moderate tricuspid regurgitation. Estimated pulmonary artery systolic pressure is 86 mmHg, consistent with severe pulmonary hypertension.     Pulmonic Valve:  There is mild pulmonic regurgitation.     Aorta:  Borderline dilated proximal ascending aorta, measuring 3.70 cm (indexed 2.20 cm/m²).     Systemic Veins:  The inferior vena cava is dilated measuring 3.65 cm in diameter, (dilated >2.1cm) with abnormal inspiratory collapse (abnormal <50%) consistent with elevated right atrial pressure (~15, range 10-20mmHg).  ____________________________________________________________________  Quantitative Data:  Left Ventricle Measurements: (Indexed to BSA)     IVSd (2D):   1.0 cm  LVPWd (2D):  1.0 cm                           Strain Measurements:  LVIDd (2D):  3.7 cm                           Global Pk Long Strain:  -15.3 %  LVIDs (2D):  2.7 cm                           Endo Pk Strain A4C:     -9.7 %  LV Mass:     111 g  66.0 g/m²                 Endo Pk Strain A2C:     -8.6 %  BiPlane LV EF%: 61 %                          Endo Pk Strain A3C:     -27.6 %     MV E Vmax: 0.66 m/s  MV A Vmax:    0.83 m/s  MV E/A:       0.79  e' lateral:   6.74 cm/s  e' medial:    3.48 cm/s  E/e' lateral: 9.75  E/e' medial:  18.88  E/e' Average: 12.86  MV DT:        229 msec    Aorta Measurements:     Ao Root s, 2D: 3.6 cm  Ao Asc prox:   3.70 cm       Left Atrium Measurements: (Indexed to BSA)  LA Diam 2D: 3.20 cm    Right Ventricle Measurements:     TAPSE:           1.3 cm  TV Fina. S':      6.09 cm/s  RV Base (RVID1): 4.6 cm  RV Mid (RVID2):  3.7 cm       LVOT / RVOT/ Qp/Qs Data: (Indexed to BSA)  LVOT Vmax: 1.16 m/s  LVOT VTI:  23.50 cm    Mitral Valve Measurements:     MV E Vmax: 0.7 m/s  MV A Vmax: 0.8 m/s  MV E/A:    0.8       Tricuspid Valve Measurements:     TR Vmax:          4.2 m/s  TR Peak Gradient: 70.9 mmHg  RA Pressure:      15 mmHg  PASP:             86 mmHg    ________________________________________________________________________________________  Electronically signed on 7/3/2023 at 4:19:05 PM by Luis Enrique Mazariegos M.D.         *** Final ***    < end of copied text >  < from: US Kidney and Bladder (07.03.23 @ 14:50) >    ACC: [id number] EXAM:  US KIDNEYS AND BLADDER   ORDERED BY:  SHAMRIZ   NOHEMI     PROCEDURE DATE:  07/03/2023          INTERPRETATION:  CLINICAL INFORMATION: AK I on CK D. Creatinine 2.64.    COMPARISON: 4/18/2023..    TECHNIQUE: Sonography of the kidneys and bladder.    FINDINGS:  Right kidney: 9.5 cm. No renal mass, hydronephrosis or calculi. Mild   increased parenchymal echogenicity. The upper to midpole cyst measuring 7   mm x 8 mm x 7 mm. There is an upper pole cyst measuring 6 mm x 6 mm x 7   mm. Additional upper pole cyst measuring 1.3 cm x 9 mm x 1.3 cm. There is   a lower pole cyst measuring 5 mm x 6 mm x 6 mm. There is a lower pole   cyst measuring 5 mm x 6 mm x 6 mm.    Left kidney: 8.8 cm. No renal mass, hydronephrosis or calculi. Mild   increased parenchymal echogenicity. There is  an upper pole cyst   measuring 7 mm x 6 mm x 7 mm.    Urinary bladder: There is some layering debris posteriorly. Difficult to   assess movement of this debris with patient turning. Difficult to exclude   mild posterior wall thickening. Please correlate with urinalysis.    IMPRESSION:    Renal parenchymal disease. No hydronephrosis. Bilateral renal cysts.    Layering debris posteriorly. Difficult to exclude mild posterior wall   thickening. Please correlate with urinalysis.            --- End of Report ---          < end of copied text >

## 2023-07-05 NOTE — PROGRESS NOTE ADULT - PROBLEM SELECTOR PLAN 2
- Doppler venous b/l LE noted to show acute bilateral above knee DVT. suspect provoked in setting of recent surgery/immobilization  - TTE with RV volume and pressure overload noted  - c/w hep gtt eventual plan to transition to DOAC if no plans for further procedures  - Vascular Cardiology consulted, will f/u recs - Doppler venous b/l LE noted to show acute bilateral above knee DVT. suspect provoked in setting of recent surgery/immobilization  - TTE with RV volume and pressure overload noted  - c/w hep gtt eventual plan to transition to DOAC if no plans for further procedures  - Will consult hematology, f/u recs

## 2023-07-05 NOTE — CHART NOTE - NSCHARTNOTEFT_GEN_A_CORE
Pt with provoked DVTs, now started on AC. Heme called regarding role of IVC filter      IVC filters are typically considered in situations where there is a high risk of pulmonary embolism, and anticoagulant therapy (blood thinners) may not be sufficient or appropriate. Some situations where an IVC filter may be considered include:    1. Contraindication to anticoagulation: IVC filters are often considered in patients who have an acute deep vein thrombosis (DVT) or a high risk of DVT but cannot receive anticoagulation therapy due to contraindications. Contraindications may include active bleeding, recent major surgery, high risk of bleeding, or a known bleeding disorder.    2. Failed anticoagulation therapy: If a patient develops recurrent or new DVT despite being on therapeutic anticoagulation, an IVC filter may be considered as an adjunctive measure to prevent pulmonary embolism. This decision is typically made after careful evaluation of the patient's condition, including assessing adherence to anticoagulation therapy and evaluating any potential underlying causes for the recurrent clots.    3. Recurrent pulmonary embolism despite anticoagulation: In some instances, despite optimal anticoagulation therapy, a patient may continue to experience recurrent pulmonary embolism. In such cases, an IVC filter may be considered to prevent further embolic events.    4. Preoperative placement: In select cases, an IVC filter may be considered before high-risk surgeries, where the risk of DVT and pulmonary embolism is elevated, and anticoagulation is contraindicated or not feasible during the perioperative period. The filter may be removed once the risk has diminished.    The reliance on IVCFs for primary VTE prophylaxis in high-risk patients is not substantiated by the available literature  https://ashpublications.org/hematology/article/2020/1/619/567974/Inferior-vena-cava-filters-a-framework-for      As such from a heme perspective, IVC filter would not be routinely recommended in this scenario. However, can consider vascular cardiology/vascular consulted for further recs      ****************************************  Serg Portillo PGY6  Hematology/Oncology   [phone]  ****************************************

## 2023-07-05 NOTE — CONSULT NOTE ADULT - ASSESSMENT
89 year old Female with PMhx of HTN, HLD, CKD IV, anemia, recent admit in April 2023 left hip fracture status post repair (On Eliquis for DVT PPx) and subsequent rehab admission, presents with shortness of breath and generalized weakness x2 days states that shortness of breath is worse on exertion. Also found to have acute B/L DVT      1- KAYLA on CKDIV  2- UTI  3- CHF  4- anemia  5- HTN      KAYLA in setting of CHF exacerbation vs UTI infection.   baseline creatinine 1.9, GFR ~23  creatinine is improving near baseline.   renal us without obstruction  appears near euvolemic on exam  echo reviewed, will need to resume diuretics in near future  ceftriaxone 1G daily  amlodipine 10 mg daily  hydralazine 100 mg TID  carvedilol 25 mg BID  trend bp  anemia, trend hgb  check retic count and iron studies  continue ferrous sulfate 325 mg daily  strict I/O  trend creatinine and electrolytes daily

## 2023-07-05 NOTE — PROGRESS NOTE ADULT - SUBJECTIVE AND OBJECTIVE BOX
Mariia Blanco MD  Division of Hospital Medicine  [facility]   Available on Microsoft Teams (Mon-Fri 8am-5pm)    * messages preferred prior to calls  Other Times:  [phone]      Patient is a 89y old  Female who presents with a chief complaint of dyspnea (04 Jul 2023 12:52)      SUBJECTIVE / OVERNIGHT EVENTS: no acute events overnight. no fever, chills, chest pain. dyspnea improved since admission.  ADDITIONAL REVIEW OF SYSTEMS:    Tele reviewed: SR 1st deg HR 50-60s    MEDICATIONS  (STANDING):  amLODIPine   Tablet 10 milliGRAM(s) Oral daily  atorvastatin 40 milliGRAM(s) Oral at bedtime  brimonidine 0.2% Ophthalmic Solution 1 Drop(s) Both EYES two times a day  calcium carbonate 1250 mG  + Vitamin D (OsCal 500 + D) 1 Tablet(s) Oral daily  carvedilol 25 milliGRAM(s) Oral every 12 hours  cefTRIAXone   IVPB 1000 milliGRAM(s) IV Intermittent every 24 hours  ferrous    sulfate 325 milliGRAM(s) Oral daily  heparin  Infusion.  Unit(s)/Hr (11 mL/Hr) IV Continuous <Continuous>  hydrALAZINE 100 milliGRAM(s) Oral three times a day  pantoprazole    Tablet 40 milliGRAM(s) Oral before breakfast  potassium chloride    Tablet ER 20 milliEquivalent(s) Oral once  senna 2 Tablet(s) Oral at bedtime  timolol 0.5% Solution 1 Drop(s) Both EYES two times a day    MEDICATIONS  (PRN):  acetaminophen     Tablet .. 650 milliGRAM(s) Oral every 6 hours PRN Temp greater or equal to 38C (100.4F), Mild Pain (1 - 3)  aluminum hydroxide/magnesium hydroxide/simethicone Suspension 30 milliLiter(s) Oral every 4 hours PRN Dyspepsia  bisacodyl Suppository 10 milliGRAM(s) Rectal daily PRN Constipation  melatonin 3 milliGRAM(s) Oral at bedtime PRN Insomnia  polyethylene glycol 3350 17 Gram(s) Oral daily PRN Constipation  traMADol 50 milliGRAM(s) Oral every 6 hours PRN Severe Pain (7 - 10)  traMADol 25 milliGRAM(s) Oral every 6 hours PRN Moderate Pain (4 - 6)      CAPILLARY BLOOD GLUCOSE        I&O's Summary    04 Jul 2023 07:01  -  05 Jul 2023 07:00  --------------------------------------------------------  IN: 780 mL / OUT: 300 mL / NET: 480 mL    05 Jul 2023 07:01  -  05 Jul 2023 12:21  --------------------------------------------------------  IN: 240 mL / OUT: 250 mL / NET: -10 mL        PHYSICAL EXAM:  Vital Signs Last 24 Hrs  T(C): 36.4 (05 Jul 2023 12:14), Max: 36.7 (05 Jul 2023 04:12)  T(F): 97.5 (05 Jul 2023 12:14), Max: 98 (05 Jul 2023 04:12)  HR: 57 (05 Jul 2023 12:14) (53 - 57)  BP: 123/65 (05 Jul 2023 12:14) (102/55 - 139/74)  BP(mean): --  RR: 18 (05 Jul 2023 12:14) (16 - 18)  SpO2: 97% (05 Jul 2023 12:14) (93% - 97%)    Parameters below as of 05 Jul 2023 12:14  Patient On (Oxygen Delivery Method): room air    CONSTITUTIONAL: pleasant elderly female in NAD  EYES: PERRLA; conjunctiva and sclera clear  ENMT: Moist oral mucosa, no pharyngeal injection or exudates; normal dentition  NECK: Supple, no palpable masses; no thyromegaly  RESPIRATORY: Normal respiratory effort; lungs are clear to auscultation bilaterally, no crackles  CARDIOVASCULAR: Regular rate and rhythm, normal S1 and S2, no murmur/rub/gallop; +trace LE edema b/l  ABDOMEN: Soft, Nondistended, Nontender to palpation, normoactive bowel sounds  MUSCULOSKELETAL: No clubbing or cyanosis of digits; no joint swelling or tenderness to palpation  PSYCH: A+O to person, place, and time; affect appropriate  NEUROLOGY: CN 2-12 are intact and symmetric; no gross sensory deficits   SKIN: No rashes; no palpable lesions    LABS:                        9.4    7.28  )-----------( 148      ( 05 Jul 2023 05:34 )             30.4     07-05    142  |  102  |  53<H>  ----------------------------<  111<H>  3.7   |  26  |  2.01<H>    Ca    9.0      05 Jul 2023 05:34  Phos  2.8     07-05  Mg     2.3     07-05    TPro  6.7  /  Alb  3.3  /  TBili  0.4  /  DBili  x   /  AST  22  /  ALT  28  /  AlkPhos  84  07-05    PTT - ( 05 Jul 2023 05:34 )  PTT:125.6 sec      Urinalysis Basic - ( 05 Jul 2023 05:34 )    Color: x / Appearance: x / SG: x / pH: x  Gluc: 111 mg/dL / Ketone: x  / Bili: x / Urobili: x   Blood: x / Protein: x / Nitrite: x   Leuk Esterase: x / RBC: x / WBC x   Sq Epi: x / Non Sq Epi: x / Bacteria: x        Culture - Blood (collected 03 Jul 2023 12:28)  Source: .Blood Blood-Peripheral  Preliminary Report (04 Jul 2023 19:02):    No growth at 24 hours    Culture - Urine (collected 03 Jul 2023 10:32)  Source: Clean Catch Clean Catch (Midstream)  Final Report (05 Jul 2023 09:02):    >=3 organisms. Probable collection contamination.    Culture - Blood (collected 03 Jul 2023 10:17)  Source: .Blood Blood-Peripheral  Preliminary Report (04 Jul 2023 14:01):    No growth at 24 hours    Culture - Urine (collected 03 Jul 2023 00:24)  Source: Clean Catch Clean Catch (Midstream)  Preliminary Report (04 Jul 2023 22:50):    >100,000 CFU/ml Klebsiella oxytoca/Raoultella ornithinolytica      RADIOLOGY & ADDITIONAL TESTS:  Results Reviewed: Cr stable, H/H stable, urine cx growing klebsiella  Imaging Personally Reviewed:  7/5/23 TTE:    CONCLUSIONS:      1. Right ventricular volume and pressure overload. The left ventricular systolic function is normal with an ejection fraction of 61 % by Encarnacion's method of disks.   2. Reduced systolic right ventricular function. The tricuspid annular plane systolic excursion (TAPSE) is 1.3 cm (normal >=1.7 cm).   3. Moderate tricuspid regurgitation.   4. Estimated pulmonary artery systolic pressure is 86 mmHg, consistent with severe pulmonary hypertension.   5. Moderate-to-severe aortic regurgitation.   6. The inferior vena cava is dilated measuring 3.65 cm in diameter, (dilated >2.1cm) with abnormal inspiratory collapse (abnormal <50%) consistent with elevated right atrial pressure (~15, range 10-20mmHg).   7. There is normal LV mass and concentric remodeling.    Electrocardiogram Personally Reviewed:    COORDINATION OF CARE:  Care Discussed with Consultants/Other Providers [Y]: medicine CRUZ Reagan  Prior or Outpatient Records Reviewed [Y/N]:

## 2023-07-05 NOTE — PROGRESS NOTE ADULT - PROBLEM SELECTOR PLAN 1
presents with dyspnea on exertion likely 2/2 CHf exacerbation +/- PE  - pro-BNP elevated to 59K on admission  - CXR with no pleural effusion/consolidation  - trops 109-->116-->88, EKG with 1st deg AV block, QTc prolongation but no ST-T changes  - s/p IV lasix now on hold in setting of KAYLA on CKD  - Doppler venous b/l LE noted to show acute bilateral above knee DVT. suspect provoked in setting of recent surgery/immobilization  - hold off CTA chest to evaluate for PE given KAYLA on CKD   - TTE with EF 61% and RV pressure and overload, mod-severe AR, mod TR  - satting well on RA

## 2023-07-05 NOTE — PROGRESS NOTE ADULT - PROBLEM SELECTOR PLAN 4
UA positive   - urine cx growing Klebsiella oxytoca, sensitivties pending  - c/w ceftriaxone x 3 day course to end 7/5  - f/u blood cx - NGTD

## 2023-07-05 NOTE — PROGRESS NOTE ADULT - PROBLEM SELECTOR PLAN 3
- appears more euvolemic on exam now  - BNP downtrending  - TTE with EF 61% and RV pressure and overload, mod-severe AR, mod TR  - s/p IV lasix on hold due to worsening Cr  - c/w carvedilol   - torsemide on hold  - strict I/Os, daily weights

## 2023-07-05 NOTE — PROGRESS NOTE ADULT - NSPROGADDITIONALINFOA_GEN_ALL_CORE
.  Mariia Blanco MD  Division of Hospital Medicine  [facility]   Available on Microsoft Teams - messages preferred prior to calls.    Plan discussed with patient, michelle Duffyin [place], and medicine CRUZ Reagan.

## 2023-07-06 ENCOUNTER — APPOINTMENT (OUTPATIENT)
Dept: ORTHOPEDIC SURGERY | Facility: CLINIC | Age: 88
End: 2023-07-06

## 2023-07-06 LAB
ANION GAP SERPL CALC-SCNC: 15 MMOL/L — SIGNIFICANT CHANGE UP (ref 5–17)
APTT BLD: 37 SEC — HIGH (ref 27.5–35.5)
APTT BLD: 45.1 SEC — HIGH (ref 27.5–35.5)
APTT BLD: 68.4 SEC — HIGH (ref 27.5–35.5)
BUN SERPL-MCNC: 42 MG/DL — HIGH (ref 7–23)
CALCIUM SERPL-MCNC: 9.2 MG/DL — SIGNIFICANT CHANGE UP (ref 8.4–10.5)
CHLORIDE SERPL-SCNC: 102 MMOL/L — SIGNIFICANT CHANGE UP (ref 96–108)
CO2 SERPL-SCNC: 25 MMOL/L — SIGNIFICANT CHANGE UP (ref 22–31)
CREAT SERPL-MCNC: 1.72 MG/DL — HIGH (ref 0.5–1.3)
EGFR: 27 ML/MIN/1.73M2 — LOW
FERRITIN SERPL-MCNC: 232 NG/ML — SIGNIFICANT CHANGE UP (ref 13–330)
GLUCOSE SERPL-MCNC: 107 MG/DL — HIGH (ref 70–99)
HCT VFR BLD CALC: 34.2 % — LOW (ref 34.5–45)
HGB BLD-MCNC: 10.5 G/DL — LOW (ref 11.5–15.5)
IRON SATN MFR SERPL: 17 % — SIGNIFICANT CHANGE UP (ref 14–50)
IRON SATN MFR SERPL: 36 UG/DL — SIGNIFICANT CHANGE UP (ref 30–160)
MAGNESIUM SERPL-MCNC: 2.4 MG/DL — SIGNIFICANT CHANGE UP (ref 1.6–2.6)
MCHC RBC-ENTMCNC: 29.7 PG — SIGNIFICANT CHANGE UP (ref 27–34)
MCHC RBC-ENTMCNC: 30.7 GM/DL — LOW (ref 32–36)
MCV RBC AUTO: 96.6 FL — SIGNIFICANT CHANGE UP (ref 80–100)
NRBC # BLD: 0 /100 WBCS — SIGNIFICANT CHANGE UP (ref 0–0)
PHOSPHATE SERPL-MCNC: 2.6 MG/DL — SIGNIFICANT CHANGE UP (ref 2.5–4.5)
PLATELET # BLD AUTO: 167 K/UL — SIGNIFICANT CHANGE UP (ref 150–400)
POTASSIUM SERPL-MCNC: 4.1 MMOL/L — SIGNIFICANT CHANGE UP (ref 3.5–5.3)
POTASSIUM SERPL-SCNC: 4.1 MMOL/L — SIGNIFICANT CHANGE UP (ref 3.5–5.3)
RBC # BLD: 3.54 M/UL — LOW (ref 3.8–5.2)
RBC # BLD: 3.54 M/UL — LOW (ref 3.8–5.2)
RBC # FLD: 15 % — HIGH (ref 10.3–14.5)
RETICS #: 169.9 K/UL — HIGH (ref 25–125)
RETICS/RBC NFR: 4.8 % — HIGH (ref 0.5–2.5)
SODIUM SERPL-SCNC: 142 MMOL/L — SIGNIFICANT CHANGE UP (ref 135–145)
TIBC SERPL-MCNC: 214 UG/DL — LOW (ref 220–430)
UIBC SERPL-MCNC: 178 UG/DL — SIGNIFICANT CHANGE UP (ref 110–370)
WBC # BLD: 7 K/UL — SIGNIFICANT CHANGE UP (ref 3.8–10.5)
WBC # FLD AUTO: 7 K/UL — SIGNIFICANT CHANGE UP (ref 3.8–10.5)

## 2023-07-06 PROCEDURE — 99232 SBSQ HOSP IP/OBS MODERATE 35: CPT

## 2023-07-06 RX ORDER — APIXABAN 2.5 MG/1
2.5 TABLET, FILM COATED ORAL
Refills: 0 | Status: DISCONTINUED | OUTPATIENT
Start: 2023-07-06 | End: 2023-07-06

## 2023-07-06 RX ORDER — APIXABAN 2.5 MG/1
5 TABLET, FILM COATED ORAL EVERY 12 HOURS
Refills: 0 | Status: DISCONTINUED | OUTPATIENT
Start: 2023-07-07 | End: 2023-07-10

## 2023-07-06 RX ADMIN — CARVEDILOL PHOSPHATE 25 MILLIGRAM(S): 80 CAPSULE, EXTENDED RELEASE ORAL at 05:01

## 2023-07-06 RX ADMIN — SENNA PLUS 2 TABLET(S): 8.6 TABLET ORAL at 21:41

## 2023-07-06 RX ADMIN — Medication 1 TABLET(S): at 11:24

## 2023-07-06 RX ADMIN — ATORVASTATIN CALCIUM 40 MILLIGRAM(S): 80 TABLET, FILM COATED ORAL at 21:41

## 2023-07-06 RX ADMIN — APIXABAN 2.5 MILLIGRAM(S): 2.5 TABLET, FILM COATED ORAL at 17:57

## 2023-07-06 RX ADMIN — PANTOPRAZOLE SODIUM 40 MILLIGRAM(S): 20 TABLET, DELAYED RELEASE ORAL at 05:01

## 2023-07-06 RX ADMIN — Medication 1 DROP(S): at 05:02

## 2023-07-06 RX ADMIN — AMLODIPINE BESYLATE 10 MILLIGRAM(S): 2.5 TABLET ORAL at 05:01

## 2023-07-06 RX ADMIN — BRIMONIDINE TARTRATE 1 DROP(S): 2 SOLUTION/ DROPS OPHTHALMIC at 17:01

## 2023-07-06 RX ADMIN — BRIMONIDINE TARTRATE 1 DROP(S): 2 SOLUTION/ DROPS OPHTHALMIC at 05:01

## 2023-07-06 RX ADMIN — CARVEDILOL PHOSPHATE 25 MILLIGRAM(S): 80 CAPSULE, EXTENDED RELEASE ORAL at 17:01

## 2023-07-06 RX ADMIN — Medication 325 MILLIGRAM(S): at 11:24

## 2023-07-06 RX ADMIN — HEPARIN SODIUM 900 UNIT(S)/HR: 5000 INJECTION INTRAVENOUS; SUBCUTANEOUS at 14:07

## 2023-07-06 RX ADMIN — HEPARIN SODIUM 900 UNIT(S)/HR: 5000 INJECTION INTRAVENOUS; SUBCUTANEOUS at 07:11

## 2023-07-06 RX ADMIN — Medication 1 DROP(S): at 17:01

## 2023-07-06 RX ADMIN — HEPARIN SODIUM 900 UNIT(S)/HR: 5000 INJECTION INTRAVENOUS; SUBCUTANEOUS at 06:21

## 2023-07-06 NOTE — CHART NOTE - NSCHARTNOTEFT_GEN_A_CORE
Medicine PA Episodic Note    Patient is a 89y old  Female who presents with a chief complaint of dyspnea (05 Jul 2023 13:09)    Notified by RN patient woke up confused and removed IV. Patient seen and examined at bedside. Patient at baseline mental status, AxO4. Patient states she woke up confused, having to go to the bathroom. Patient states she was aware that she pulled out her IV but was unsure why. Patient denies headache, dizziness, chest pain, SOB.       Vital Signs Last 24 Hrs  T(C): 36.4 (05 Jul 2023 20:18), Max: 36.7 (05 Jul 2023 04:12)  T(F): 97.5 (05 Jul 2023 20:18), Max: 98 (05 Jul 2023 04:12)  HR: 70 (05 Jul 2023 22:00) (53 - 70)  BP: 130/64 (05 Jul 2023 22:00) (110/58 - 139/74)  BP(mean): --  RR: 18 (05 Jul 2023 20:18) (18 - 18)  SpO2: 93% (05 Jul 2023 20:18) (93% - 97%)    Parameters below as of 05 Jul 2023 20:18  Patient On (Oxygen Delivery Method): room air          Labs:                          9.4    7.28  )-----------( 148      ( 05 Jul 2023 05:34 )             30.4     07-05    142  |  102  |  53<H>  ----------------------------<  111<H>  3.7   |  26  |  2.01<H>    Ca    9.0      05 Jul 2023 05:34  Phos  2.8     07-05  Mg     2.3     07-05    TPro  6.7  /  Alb  3.3  /  TBili  0.4  /  DBili  x   /  AST  22  /  ALT  28  /  AlkPhos  84  07-05        Radiology:    Physical Exam:  General: WN/WD NAD  Neurology: A&Ox4, nonfocal  Head:  Normocephalic, atraumatic  MSK: FROM all 4 extremity    Assessment & Plan:  HPI:  89F with PMhx of HTN, HLD, CKD IV, anemia, recent admit in April 2023 left hip fracture status post repair (On Eliquis for DVT PPx) and subsequent rehab admission, presents with shortness of breath and generalized weakness x2 days states that shortness of breath is worse on exertion. Denies chest pain fevers, chills, cough, HA, dizziness, syncope, chest palpitations, abd pain, n/v/d, urinary or bowel changes, active sites of bleeding or any other symptoms at this time.  Patient ambulates with a cane since discharge.  (03 Jul 2023 09:16)    A/P: Notified by RN patient woke up with confusion and pulled out IV. RN concerned because patient is not confused at baseline and is currently on a heparin drip for B/L DVT. Patient seen and examined at bedside. Patient answered all questions appropriately and is at her baseline mentation, AxO4. Patient 89yo and currently being treated for UTI with antibiotics. Patient was aware that she was acutely confused when she woke up, but denied any other associated symptoms. Neuro exam was unremarkable.       Plan:  #Confusion  - IV replaced  -  Will continue heparin as no indication to d/c at this time   - Will continue to monitor  - Will endorse to day team  - Would consider CT head if change in mental status       Follow up with Attending in AM.    Lazara Rivera PA-C  Department of Medicine  [id number]

## 2023-07-06 NOTE — PROGRESS NOTE ADULT - PROBLEM SELECTOR PLAN 2
- Doppler venous b/l LE noted to show acute bilateral above knee DVT. suspect provoked in setting of recent surgery/immobilization  - TTE with RV volume and pressure overload noted  - transition hep gtt to eliquis   - Hematology recs appreciated, no need for IVC filter placement at this time given can tolerate full a/c

## 2023-07-06 NOTE — PROGRESS NOTE ADULT - PROBLEM SELECTOR PLAN 3
- appears more euvolemic on exam now  - BNP downtrending  - TTE with EF 61% and RV pressure and overload, mod-severe AR, mod TR  - s/p IV lasix on hold due to worsening Cr (now improved  - c/w carvedilol   - will resume torsemide 20mg BID on 7/7 as per discussion with Nephrology Attending Dr. Kurtz  - strict I/Os, daily weights

## 2023-07-06 NOTE — PROGRESS NOTE ADULT - PROBLEM SELECTOR PLAN 4
UA positive   - urine cx growing Klebsiella oxytoca, sensitivities noted  - completed ceftriaxone x 3 day course on 7/5  - f/u blood cx - NGTD

## 2023-07-06 NOTE — PROVIDER CONTACT NOTE (OTHER) - ASSESSMENT
Patient is A&Ox4, appears to be slightly confused, pt reported she felt confused and pulled IV out. Pt reports no distress, no dizziness, no SOB, VSS, see in flow sheets.

## 2023-07-06 NOTE — PROGRESS NOTE ADULT - SUBJECTIVE AND OBJECTIVE BOX
[facility]   [phone]  RENAL FOLLOW UP NOTE  --------------------------------------------------------------------------------  Chief Complaint:    24 hour events/subjective:    patient seen and examined.   denies sob    PAST HISTORY  --------------------------------------------------------------------------------  No significant changes to PMH, PSH, FHx, SHx, unless otherwise noted    ALLERGIES & MEDICATIONS  --------------------------------------------------------------------------------  Allergies    No Known Allergies    Intolerances      Standing Inpatient Medications  amLODIPine   Tablet 10 milliGRAM(s) Oral daily  atorvastatin 40 milliGRAM(s) Oral at bedtime  brimonidine 0.2% Ophthalmic Solution 1 Drop(s) Both EYES two times a day  calcium carbonate 1250 mG  + Vitamin D (OsCal 500 + D) 1 Tablet(s) Oral daily  carvedilol 25 milliGRAM(s) Oral every 12 hours  ferrous    sulfate 325 milliGRAM(s) Oral daily  heparin  Infusion.  Unit(s)/Hr IV Continuous <Continuous>  hydrALAZINE 100 milliGRAM(s) Oral three times a day  pantoprazole    Tablet 40 milliGRAM(s) Oral before breakfast  senna 2 Tablet(s) Oral at bedtime  timolol 0.5% Solution 1 Drop(s) Both EYES two times a day    PRN Inpatient Medications  acetaminophen     Tablet .. 650 milliGRAM(s) Oral every 6 hours PRN  aluminum hydroxide/magnesium hydroxide/simethicone Suspension 30 milliLiter(s) Oral every 4 hours PRN  bisacodyl Suppository 10 milliGRAM(s) Rectal daily PRN  melatonin 3 milliGRAM(s) Oral at bedtime PRN  polyethylene glycol 3350 17 Gram(s) Oral daily PRN  traMADol 50 milliGRAM(s) Oral every 6 hours PRN  traMADol 25 milliGRAM(s) Oral every 6 hours PRN      REVIEW OF SYSTEMS  --------------------------------------------------------------------------------    Gen: denies fevers/chills,  CVS: denies chest pain/palpitations  Resp: denies SOB/Cough  GI: Denies N/V/Abd pain  : Denies dysuria    VITALS/PHYSICAL EXAM  --------------------------------------------------------------------------------  T(C): 36.4 (07-06-23 @ 11:22), Max: 37 (07-06-23 @ 03:20)  HR: 62 (07-06-23 @ 11:22) (62 - 73)  BP: 109/54 (07-06-23 @ 11:22) (109/54 - 140/70)  RR: 18 (07-06-23 @ 11:33) (18 - 18)  SpO2: 93% (07-06-23 @ 11:33) (90% - 97%)  Wt(kg): --        07-05-23 @ 07:01  -  07-06-23 @ 07:00  --------------------------------------------------------  IN: 700 mL / OUT: 525 mL / NET: 175 mL    07-06-23 @ 07:01  -  07-06-23 @ 12:56  --------------------------------------------------------  IN: 240 mL / OUT: 250 mL / NET: -10 mL      Physical Exam:  	  	Gen: Non toxic comfortable appearing   	Pulm: decrease bs  no rales or ronchi or wheezing  	CV: Mild JVD. RRR, S1S2; no rub  	Back: No CVA tenderness; no sacral edema  	Abd: +BS, soft, nontender/nondistended  	: No suprapubic tenderness  	UE: Warm, no cyanosis  no clubbing,  no edema;  	LE: Warm, no cyanosis  no clubbing, trace edema  	Neuro: alert and oriented. speech coherent   	Skin: Warm, no decrease skin turgor     LABS/STUDIES  --------------------------------------------------------------------------------              10.5   7.00  >-----------<  167      [07-06-23 @ 05:27]              34.2     142  |  102  |  42  ----------------------------<  107      [07-06-23 @ 05:28]  4.1   |  25  |  1.72        Ca     9.2     [07-06-23 @ 05:28]      Mg     2.4     [07-06-23 @ 05:28]      Phos  2.6     [07-06-23 @ 05:28]    TPro  6.7  /  Alb  3.3  /  TBili  0.4  /  DBili  x   /  AST  22  /  ALT  28  /  AlkPhos  84  [07-05-23 @ 05:34]      PTT: 45.1       [07-06-23 @ 05:29]      Creatinine Trend:  SCr 1.72 [07-06 @ 05:28]  SCr 2.01 [07-05 @ 05:34]  SCr 2.13 [07-04 @ 07:05]  SCr 2.64 [07-03 @ 00:24]  SCr 2.52 [07-02 @ 22:53]              Urinalysis - [07-06-23 @ 05:28]      Color  / Appearance  / SG  / pH       Gluc 107 / Ketone   / Bili  / Urobili        Blood  / Protein  / Leuk Est  / Nitrite       RBC  / WBC  / Hyaline  / Gran  / Sq Epi  / Non Sq Epi  / Bacteria       Iron 36, TIBC 214, %sat 17      [07-06-23 @ 05:29]  Ferritin 232      [07-06-23 @ 05:29]  PTH -- (Ca 9.1)      [04-18-23 @ 07:37]   128  TSH 5.03      [07-02-23 @ 22:53]

## 2023-07-06 NOTE — PROGRESS NOTE ADULT - PROBLEM SELECTOR PLAN 1
presents with dyspnea on exertion likely 2/2 CHf exacerbation +/- PE given DVT  - pro-BNP elevated to 59K on admission  - CXR with no pleural effusion/consolidation  - trops 109-->116-->88, EKG with 1st deg AV block, QTc prolongation but no ST-T changes  - s/p IV lasix now on hold in setting of KAYLA on CKD  - Doppler venous b/l LE noted to show acute bilateral above knee DVT. suspect provoked in setting of recent surgery/immobilization  - hold off CTA chest to evaluate for PE given KAYLA on CKD and as it would not  at this time  - TTE with EF 61% and RV pressure and overload, mod-severe AR, mod TR  - satting well on RA

## 2023-07-06 NOTE — PROGRESS NOTE ADULT - PROBLEM SELECTOR PLAN 5
Cr of 2.52, discharged on 1.98, CKD IV, baseline creatinine 1.5 range  - Cr improving  - renal US negative for hydro; renal parenchymal disease  - treat UTI as above  - Nephrology consulted, recs appreciated  - will continue to hold torsemide for additional day with plan to resume tomorrow 7/7  - renally dose meds to GFR, avoid nephrotoxic agents

## 2023-07-06 NOTE — PROGRESS NOTE ADULT - SUBJECTIVE AND OBJECTIVE BOX
Mariia Blanco MD  Division of Hospital Medicine  [facility]   Available on [employer] Teams (Mon-Fri 8am-5pm)    * messages preferred prior to calls  Other Times:  [phone]      Patient is a 89y old  Female who presents with a chief complaint of dyspnea (06 Jul 2023 12:55)      SUBJECTIVE / OVERNIGHT EVENTS: brief episode of confusion overnight. forgot her surroundings and pulled out her IV. mental status back to baseline at time of my exam. denies any dyspnea, chest pain, abd pain, n/v.   ADDITIONAL REVIEW OF SYSTEMS:    Tele reviewed: 1st deg, SR 50-60s    MEDICATIONS  (STANDING):  amLODIPine   Tablet 10 milliGRAM(s) Oral daily  atorvastatin 40 milliGRAM(s) Oral at bedtime  brimonidine 0.2% Ophthalmic Solution 1 Drop(s) Both EYES two times a day  calcium carbonate 1250 mG  + Vitamin D (OsCal 500 + D) 1 Tablet(s) Oral daily  carvedilol 25 milliGRAM(s) Oral every 12 hours  ferrous    sulfate 325 milliGRAM(s) Oral daily  heparin  Infusion.  Unit(s)/Hr (11 mL/Hr) IV Continuous <Continuous>  hydrALAZINE 100 milliGRAM(s) Oral three times a day  pantoprazole    Tablet 40 milliGRAM(s) Oral before breakfast  senna 2 Tablet(s) Oral at bedtime  timolol 0.5% Solution 1 Drop(s) Both EYES two times a day    MEDICATIONS  (PRN):  acetaminophen     Tablet .. 650 milliGRAM(s) Oral every 6 hours PRN Temp greater or equal to 38C (100.4F), Mild Pain (1 - 3)  aluminum hydroxide/magnesium hydroxide/simethicone Suspension 30 milliLiter(s) Oral every 4 hours PRN Dyspepsia  bisacodyl Suppository 10 milliGRAM(s) Rectal daily PRN Constipation  melatonin 3 milliGRAM(s) Oral at bedtime PRN Insomnia  polyethylene glycol 3350 17 Gram(s) Oral daily PRN Constipation  traMADol 50 milliGRAM(s) Oral every 6 hours PRN Severe Pain (7 - 10)  traMADol 25 milliGRAM(s) Oral every 6 hours PRN Moderate Pain (4 - 6)      CAPILLARY BLOOD GLUCOSE        I&O's Summary    05 Jul 2023 07:01  -  06 Jul 2023 07:00  --------------------------------------------------------  IN: 700 mL / OUT: 525 mL / NET: 175 mL    06 Jul 2023 07:01  -  06 Jul 2023 14:09  --------------------------------------------------------  IN: 240 mL / OUT: 250 mL / NET: -10 mL        PHYSICAL EXAM:  Vital Signs Last 24 Hrs  T(C): 36.4 (06 Jul 2023 11:22), Max: 37 (06 Jul 2023 03:20)  T(F): 97.6 (06 Jul 2023 11:22), Max: 98.6 (06 Jul 2023 03:20)  HR: 62 (06 Jul 2023 11:22) (62 - 73)  BP: 109/54 (06 Jul 2023 11:22) (109/54 - 140/70)  BP(mean): --  RR: 18 (06 Jul 2023 11:33) (18 - 18)  SpO2: 93% (06 Jul 2023 11:33) (90% - 97%)    Parameters below as of 06 Jul 2023 11:33  Patient On (Oxygen Delivery Method): room air      CONSTITUTIONAL: pleasant elderly female in NAD  EYES: PERRLA; conjunctiva and sclera clear  ENMT: Moist oral mucosa, no pharyngeal injection or exudates; normal dentition  NECK: Supple, no palpable masses; no thyromegaly  RESPIRATORY: Normal respiratory effort; lungs are clear to auscultation bilaterally, no crackles  CARDIOVASCULAR: Regular rate and rhythm, normal S1 and S2, no murmur/rub/gallop; +trace LE edema b/l  ABDOMEN: Soft, Nondistended, Nontender to palpation, normoactive bowel sounds  MUSCULOSKELETAL: No clubbing or cyanosis of digits; no joint swelling or tenderness to palpation  PSYCH: A+O to person, place, and time; affect appropriate  NEUROLOGY: CN 2-12 are intact and symmetric; no gross sensory deficits   SKIN: No rashes; no palpable lesions    LABS:                        10.5   7.00  )-----------( 167      ( 06 Jul 2023 05:27 )             34.2     07-06    142  |  102  |  42<H>  ----------------------------<  107<H>  4.1   |  25  |  1.72<H>    Ca    9.2      06 Jul 2023 05:28  Phos  2.6     07-06  Mg     2.4     07-06    TPro  6.7  /  Alb  3.3  /  TBili  0.4  /  DBili  x   /  AST  22  /  ALT  28  /  AlkPhos  84  07-05    PTT - ( 06 Jul 2023 12:01 )  PTT:68.4 sec      Urinalysis Basic - ( 06 Jul 2023 05:28 )    Color: x / Appearance: x / SG: x / pH: x  Gluc: 107 mg/dL / Ketone: x  / Bili: x / Urobili: x   Blood: x / Protein: x / Nitrite: x   Leuk Esterase: x / RBC: x / WBC x   Sq Epi: x / Non Sq Epi: x / Bacteria: x          RADIOLOGY & ADDITIONAL TESTS:  Results Reviewed: Cr improved, H/H stable  Imaging Personally Reviewed:  Electrocardiogram Personally Reviewed:    COORDINATION OF CARE:  Care Discussed with Consultants/Other Providers [Y]: Nephrology Attending Dr. Kurtz, medicine NP Devorah  Prior or Outpatient Records Reviewed [Y/N]:

## 2023-07-06 NOTE — PROGRESS NOTE ADULT - NSPROGADDITIONALINFOA_GEN_ALL_CORE
.  Mariia Blanco MD  Division of Hospital Medicine  [facility]   Available on Microsoft Teams - messages preferred prior to calls.    Plan discussed with patient, michelle Wilkerson via phone, PANCHO Bermeo, and medicine NP Kannan

## 2023-07-07 ENCOUNTER — TRANSCRIPTION ENCOUNTER (OUTPATIENT)
Age: 88
End: 2023-07-07

## 2023-07-07 LAB
ANION GAP SERPL CALC-SCNC: 12 MMOL/L — SIGNIFICANT CHANGE UP (ref 5–17)
BUN SERPL-MCNC: 32 MG/DL — HIGH (ref 7–23)
CALCIUM SERPL-MCNC: 8.9 MG/DL — SIGNIFICANT CHANGE UP (ref 8.4–10.5)
CHLORIDE SERPL-SCNC: 103 MMOL/L — SIGNIFICANT CHANGE UP (ref 96–108)
CO2 SERPL-SCNC: 26 MMOL/L — SIGNIFICANT CHANGE UP (ref 22–31)
CREAT SERPL-MCNC: 1.42 MG/DL — HIGH (ref 0.5–1.3)
EGFR: 33 ML/MIN/1.73M2 — LOW
GLUCOSE SERPL-MCNC: 103 MG/DL — HIGH (ref 70–99)
HCT VFR BLD CALC: 32.5 % — LOW (ref 34.5–45)
HGB BLD-MCNC: 10 G/DL — LOW (ref 11.5–15.5)
MCHC RBC-ENTMCNC: 29.1 PG — SIGNIFICANT CHANGE UP (ref 27–34)
MCHC RBC-ENTMCNC: 30.8 GM/DL — LOW (ref 32–36)
MCV RBC AUTO: 94.5 FL — SIGNIFICANT CHANGE UP (ref 80–100)
NRBC # BLD: 0 /100 WBCS — SIGNIFICANT CHANGE UP (ref 0–0)
PLATELET # BLD AUTO: 152 K/UL — SIGNIFICANT CHANGE UP (ref 150–400)
POTASSIUM SERPL-MCNC: 3.7 MMOL/L — SIGNIFICANT CHANGE UP (ref 3.5–5.3)
POTASSIUM SERPL-SCNC: 3.7 MMOL/L — SIGNIFICANT CHANGE UP (ref 3.5–5.3)
RBC # BLD: 3.44 M/UL — LOW (ref 3.8–5.2)
RBC # FLD: 15.2 % — HIGH (ref 10.3–14.5)
SODIUM SERPL-SCNC: 141 MMOL/L — SIGNIFICANT CHANGE UP (ref 135–145)
WBC # BLD: 7.65 K/UL — SIGNIFICANT CHANGE UP (ref 3.8–10.5)
WBC # FLD AUTO: 7.65 K/UL — SIGNIFICANT CHANGE UP (ref 3.8–10.5)

## 2023-07-07 PROCEDURE — 99232 SBSQ HOSP IP/OBS MODERATE 35: CPT

## 2023-07-07 RX ORDER — POLYETHYLENE GLYCOL 3350 17 G/17G
17 POWDER, FOR SOLUTION ORAL DAILY
Refills: 0 | Status: DISCONTINUED | OUTPATIENT
Start: 2023-07-07 | End: 2023-07-10

## 2023-07-07 RX ORDER — POTASSIUM CHLORIDE 20 MEQ
40 PACKET (EA) ORAL ONCE
Refills: 0 | Status: COMPLETED | OUTPATIENT
Start: 2023-07-07 | End: 2023-07-07

## 2023-07-07 RX ADMIN — Medication 1 TABLET(S): at 12:48

## 2023-07-07 RX ADMIN — ATORVASTATIN CALCIUM 40 MILLIGRAM(S): 80 TABLET, FILM COATED ORAL at 21:57

## 2023-07-07 RX ADMIN — CARVEDILOL PHOSPHATE 25 MILLIGRAM(S): 80 CAPSULE, EXTENDED RELEASE ORAL at 06:32

## 2023-07-07 RX ADMIN — Medication 325 MILLIGRAM(S): at 12:48

## 2023-07-07 RX ADMIN — POLYETHYLENE GLYCOL 3350 17 GRAM(S): 17 POWDER, FOR SOLUTION ORAL at 12:49

## 2023-07-07 RX ADMIN — PANTOPRAZOLE SODIUM 40 MILLIGRAM(S): 20 TABLET, DELAYED RELEASE ORAL at 06:33

## 2023-07-07 RX ADMIN — APIXABAN 5 MILLIGRAM(S): 2.5 TABLET, FILM COATED ORAL at 17:04

## 2023-07-07 RX ADMIN — Medication 20 MILLIGRAM(S): at 08:44

## 2023-07-07 RX ADMIN — Medication 100 MILLIGRAM(S): at 06:32

## 2023-07-07 RX ADMIN — APIXABAN 5 MILLIGRAM(S): 2.5 TABLET, FILM COATED ORAL at 06:35

## 2023-07-07 RX ADMIN — BRIMONIDINE TARTRATE 1 DROP(S): 2 SOLUTION/ DROPS OPHTHALMIC at 17:04

## 2023-07-07 RX ADMIN — Medication 40 MILLIEQUIVALENT(S): at 08:44

## 2023-07-07 RX ADMIN — Medication 1 DROP(S): at 17:04

## 2023-07-07 RX ADMIN — BRIMONIDINE TARTRATE 1 DROP(S): 2 SOLUTION/ DROPS OPHTHALMIC at 06:33

## 2023-07-07 RX ADMIN — SENNA PLUS 2 TABLET(S): 8.6 TABLET ORAL at 21:57

## 2023-07-07 RX ADMIN — CARVEDILOL PHOSPHATE 25 MILLIGRAM(S): 80 CAPSULE, EXTENDED RELEASE ORAL at 17:04

## 2023-07-07 RX ADMIN — Medication 20 MILLIGRAM(S): at 17:04

## 2023-07-07 RX ADMIN — AMLODIPINE BESYLATE 10 MILLIGRAM(S): 2.5 TABLET ORAL at 06:32

## 2023-07-07 RX ADMIN — Medication 1 DROP(S): at 06:32

## 2023-07-07 NOTE — DISCHARGE NOTE NURSING/CASE MANAGEMENT/SOCIAL WORK - NSDCPEFALRISK_GEN_ALL_CORE
[facility]ed [facility] and spoke with Daisy Colin in admissions and they do not have a bed available. Called [facility] and spoke with Stephen in admissions and their skilled beds are held for their AL residents in the hospital at this time. Called pt's sister to update that this worker is still awaiting a call back from [address] [place] [address] update to [facility] requesting a call back if pt can admit tomorrow.  Radha Adams, MSW, LSW For information on Fall & Injury Prevention, visit: [email or web address] OR  [email or web address] OR  https://www.cdc.gov/steadi/patient.html

## 2023-07-07 NOTE — PROGRESS NOTE ADULT - SUBJECTIVE AND OBJECTIVE BOX
[facility]   [phone]  RENAL FOLLOW UP NOTE  --------------------------------------------------------------------------------  Chief Complaint:    24 hour events/subjective:    patient seen and examined.   states she had sob overnight, non currently    PAST HISTORY  --------------------------------------------------------------------------------  No significant changes to PMH, PSH, FHx, SHx, unless otherwise noted    ALLERGIES & MEDICATIONS  --------------------------------------------------------------------------------  Allergies    No Known Allergies    Intolerances      Standing Inpatient Medications  amLODIPine   Tablet 10 milliGRAM(s) Oral daily  apixaban 5 milliGRAM(s) Oral every 12 hours  atorvastatin 40 milliGRAM(s) Oral at bedtime  brimonidine 0.2% Ophthalmic Solution 1 Drop(s) Both EYES two times a day  calcium carbonate 1250 mG  + Vitamin D (OsCal 500 + D) 1 Tablet(s) Oral daily  carvedilol 25 milliGRAM(s) Oral every 12 hours  ferrous    sulfate 325 milliGRAM(s) Oral daily  hydrALAZINE 100 milliGRAM(s) Oral three times a day  pantoprazole    Tablet 40 milliGRAM(s) Oral before breakfast  senna 2 Tablet(s) Oral at bedtime  timolol 0.5% Solution 1 Drop(s) Both EYES two times a day  torsemide 20 milliGRAM(s) Oral every 12 hours    PRN Inpatient Medications  acetaminophen     Tablet .. 650 milliGRAM(s) Oral every 6 hours PRN  aluminum hydroxide/magnesium hydroxide/simethicone Suspension 30 milliLiter(s) Oral every 4 hours PRN  bisacodyl Suppository 10 milliGRAM(s) Rectal daily PRN  melatonin 3 milliGRAM(s) Oral at bedtime PRN  polyethylene glycol 3350 17 Gram(s) Oral daily PRN  traMADol 25 milliGRAM(s) Oral every 6 hours PRN  traMADol 50 milliGRAM(s) Oral every 6 hours PRN      REVIEW OF SYSTEMS  --------------------------------------------------------------------------------    Gen: denies fevers/chills,  CVS: denies chest pain/palpitations  Resp: denies SOB/Cough  GI: Denies N/V/Abd pain  : Denies dysuria    VITALS/PHYSICAL EXAM  --------------------------------------------------------------------------------  T(C): 36.4 (07-07-23 @ 04:30), Max: 36.7 (07-06-23 @ 16:50)  HR: 59 (07-07-23 @ 04:30) (59 - 97)  BP: 128/68 (07-07-23 @ 04:30) (109/54 - 128/68)  RR: 18 (07-07-23 @ 04:30) (16 - 18)  SpO2: 94% (07-07-23 @ 04:30) (90% - 99%)  Wt(kg): --        07-06-23 @ 07:01  -  07-07-23 @ 07:00  --------------------------------------------------------  IN: 819 mL / OUT: 850 mL / NET: -31 mL      Physical Exam:  	  	Gen: Non toxic comfortable appearing   	Pulm: decrease bs  no rales or ronchi or wheezing  	CV: +JVD. RRR, S1S2; no rub  	Abd: +BS, soft, nontender/nondistended  	: No suprapubic tenderness  	UE: Warm, no cyanosis  no clubbing,  no edema;  	LE: Warm, no cyanosis  no clubbing, trace edema    LABS/STUDIES  --------------------------------------------------------------------------------              10.0   7.65  >-----------<  152      [07-07-23 @ 06:48]              32.5     141  |  103  |  32  ----------------------------<  103      [07-07-23 @ 06:48]  3.7   |  26  |  1.42        Ca     8.9     [07-07-23 @ 06:48]      Mg     2.4     [07-06-23 @ 05:28]      Phos  2.6     [07-06-23 @ 05:28]        PTT: 37.0       [07-06-23 @ 20:30]      Creatinine Trend:  SCr 1.42 [07-07 @ 06:48]  SCr 1.72 [07-06 @ 05:28]  SCr 2.01 [07-05 @ 05:34]  SCr 2.13 [07-04 @ 07:05]  SCr 2.64 [07-03 @ 00:24]              Urinalysis - [07-07-23 @ 06:48]      Color  / Appearance  / SG  / pH       Gluc 103 / Ketone   / Bili  / Urobili        Blood  / Protein  / Leuk Est  / Nitrite       RBC  / WBC  / Hyaline  / Gran  / Sq Epi  / Non Sq Epi  / Bacteria       Iron 36, TIBC 214, %sat 17      [07-06-23 @ 05:29]  Ferritin 232      [07-06-23 @ 05:29]  PTH -- (Ca 9.1)      [04-18-23 @ 07:37]   128  TSH 5.03      [07-02-23 @ 22:53]

## 2023-07-07 NOTE — PROGRESS NOTE ADULT - NSPROGADDITIONALINFOA_GEN_ALL_CORE
.  Mariia Blanco MD  Division of Hospital Medicine  [facility]   Available on Microsoft Teams - messages preferred prior to calls.    Plan discussed with patient, michelle Wilkerson via phone on 7/6, Nephrology NP John, clinical pharmacist Yin, and medicine NP Devorah.

## 2023-07-07 NOTE — PROGRESS NOTE ADULT - PROBLEM SELECTOR PLAN 1
presents with dyspnea on exertion likely 2/2 CHf exacerbation +/- PE given known acute DVT  - pro-BNP elevated to 59K on admission  - CXR with no pleural effusion/consolidation  - trops 109-->116-->88, EKG with 1st deg AV block, QTc prolongation but no ST-T changes  - s/p IV lasix now, held for KAYLA, with no PO diuretics resumed  - Doppler venous b/l LE noted to show acute bilateral above knee DVT. suspect provoked in setting of recent surgery/immobilization  - hold off CTA chest to evaluate for PE given KAYLA on CKD and as it would not  at this time  - TTE with EF 61% and RV pressure and overload, mod-severe AR, mod TR  - satting well on RA presents with dyspnea on exertion likely 2/2 CHf exacerbation +/- ?PE given known acute DVT  - pro-BNP elevated to 59K on admission, improving  - trops 109-->116-->88, EKG with 1st deg AV block, QTc prolongation but no ST-T changes  - CXR with no pleural effusion/consolidation  - s/p IV lasix now, held for KAYLA, with now PO diuretics resumed  - duplex of LE revealed acute bilateral above knee DVT. suspect provoked in setting of recent surgery/immobilization  - hold off CTA chest to evaluate for PE given KAYLA on CKD and as it would not  at this time  - TTE with EF 61% and RV pressure and overload, mod-severe AR, mod TR  - satting well on RA

## 2023-07-07 NOTE — PROGRESS NOTE ADULT - PROBLEM SELECTOR PLAN 4
UA positive   - urine cx growing Klebsiella oxytoca, sensitivities noted  - completed ceftriaxone x 3 day course on 7/5  - blood cx - NGTD

## 2023-07-07 NOTE — PROGRESS NOTE ADULT - PROBLEM SELECTOR PLAN 7
S/P an Open reduction and internal fixation of the left hip from April 14 2023  - c/w Tramadol PRN for moderate/severe pain (taking it at home of discharge)  - was on Eliquis 2.5 mg BID for 6 weeks for post-operative for DVT ppx S/P an open reduction and internal fixation of the left hip from April 14, 2023  - c/w Tramadol PRN for moderate/severe pain (taking it at home of discharge)  - was on Eliquis 2.5 mg BID for 6 weeks for post-operative for DVT ppx

## 2023-07-07 NOTE — PROGRESS NOTE ADULT - PROBLEM SELECTOR PLAN 8
DVT ppx: eliquis    Dispo: PT recs [facility] but patient and son want home with home PT DVT ppx: eliquis for DVT    Dispo: PT recs [facility] but patient and son want home with home PT

## 2023-07-07 NOTE — PROGRESS NOTE ADULT - SUBJECTIVE AND OBJECTIVE BOX
Mariia Blanco MD  Division of Hospital Medicine  [facility]   Available on Microsoft Teams (Mon-Fri 8am-5pm)    * messages preferred prior to calls  Other Times:  [phone]      Patient is a 89y old  Female who presents with a chief complaint of dyspnea (07 Jul 2023 10:32)      SUBJECTIVE / OVERNIGHT EVENTS: increased shortness of breath overnight transiently requiring supplemental O2. improved by time of my exam, back on RA. no fever, chills, chest pain, palpitations. +constipation but passing flatus  ADDITIONAL REVIEW OF SYSTEMS:    Tele reviewed: SB/Sr with HR 50-70s    MEDICATIONS  (STANDING):  amLODIPine   Tablet 10 milliGRAM(s) Oral daily  apixaban 5 milliGRAM(s) Oral every 12 hours  atorvastatin 40 milliGRAM(s) Oral at bedtime  brimonidine 0.2% Ophthalmic Solution 1 Drop(s) Both EYES two times a day  calcium carbonate 1250 mG  + Vitamin D (OsCal 500 + D) 1 Tablet(s) Oral daily  carvedilol 25 milliGRAM(s) Oral every 12 hours  ferrous    sulfate 325 milliGRAM(s) Oral daily  hydrALAZINE 100 milliGRAM(s) Oral three times a day  pantoprazole    Tablet 40 milliGRAM(s) Oral before breakfast  polyethylene glycol 3350 17 Gram(s) Oral daily  senna 2 Tablet(s) Oral at bedtime  timolol 0.5% Solution 1 Drop(s) Both EYES two times a day  torsemide 20 milliGRAM(s) Oral every 12 hours    MEDICATIONS  (PRN):  acetaminophen     Tablet .. 650 milliGRAM(s) Oral every 6 hours PRN Temp greater or equal to 38C (100.4F), Mild Pain (1 - 3)  aluminum hydroxide/magnesium hydroxide/simethicone Suspension 30 milliLiter(s) Oral every 4 hours PRN Dyspepsia  bisacodyl Suppository 10 milliGRAM(s) Rectal daily PRN Constipation  melatonin 3 milliGRAM(s) Oral at bedtime PRN Insomnia  traMADol 25 milliGRAM(s) Oral every 6 hours PRN Moderate Pain (4 - 6)  traMADol 50 milliGRAM(s) Oral every 6 hours PRN Severe Pain (7 - 10)      CAPILLARY BLOOD GLUCOSE        I&O's Summary    06 Jul 2023 07:01  -  07 Jul 2023 07:00  --------------------------------------------------------  IN: 819 mL / OUT: 850 mL / NET: -31 mL    07 Jul 2023 07:01  -  07 Jul 2023 12:53  --------------------------------------------------------  IN: 360 mL / OUT: 200 mL / NET: 160 mL        PHYSICAL EXAM:  Vital Signs Last 24 Hrs  T(C): 36.6 (07 Jul 2023 11:22), Max: 36.7 (06 Jul 2023 16:50)  T(F): 97.8 (07 Jul 2023 11:22), Max: 98.1 (06 Jul 2023 16:50)  HR: 60 (07 Jul 2023 11:22) (59 - 97)  BP: 103/60 (07 Jul 2023 11:22) (103/60 - 128/68)  BP(mean): --  RR: 18 (07 Jul 2023 11:22) (16 - 18)  SpO2: 90% (07 Jul 2023 11:22) (90% - 99%)    Parameters below as of 07 Jul 2023 11:22  Patient On (Oxygen Delivery Method): room air    CONSTITUTIONAL: pleasant elderly female in NAD  EYES: PERRLA; conjunctiva and sclera clear  ENMT: Moist oral mucosa, no pharyngeal injection or exudates; normal dentition  NECK: Supple, no palpable masses; no thyromegaly  RESPIRATORY: Normal respiratory effort; lungs are clear to auscultation bilaterally, no crackles  CARDIOVASCULAR: Regular rate and rhythm, normal S1 and S2, no murmur/rub/gallop; +trace LE edema b/l  ABDOMEN: Soft, Nondistended, Nontender to palpation, normoactive bowel sounds  MUSCULOSKELETAL: No clubbing or cyanosis of digits; no joint swelling or tenderness to palpation  PSYCH: A+O to person, place, and time; affect appropriate  NEUROLOGY: CN 2-12 are intact and symmetric; no gross sensory deficits   SKIN: No rashes; no palpable lesions    LABS:                        10.0   7.65  )-----------( 152      ( 07 Jul 2023 06:48 )             32.5     07-07    141  |  103  |  32<H>  ----------------------------<  103<H>  3.7   |  26  |  1.42<H>    Ca    8.9      07 Jul 2023 06:48  Phos  2.6     07-06  Mg     2.4     07-06      PTT - ( 06 Jul 2023 20:30 )  PTT:37.0 sec      Urinalysis Basic - ( 07 Jul 2023 06:48 )    Color: x / Appearance: x / SG: x / pH: x  Gluc: 103 mg/dL / Ketone: x  / Bili: x / Urobili: x   Blood: x / Protein: x / Nitrite: x   Leuk Esterase: x / RBC: x / WBC x   Sq Epi: x / Non Sq Epi: x / Bacteria: x      RADIOLOGY & ADDITIONAL TESTS:  Results Reviewed: no leukocytosis, H/H stable, Cr improved  Imaging Personally Reviewed:  Electrocardiogram Personally Reviewed:    COORDINATION OF CARE:  Care Discussed with Consultants/Other Providers [Y]: Nephrology NP John, medicine NP Devorah  Prior or Outpatient Records Reviewed [Y/N]:

## 2023-07-07 NOTE — PROGRESS NOTE ADULT - PROBLEM SELECTOR PLAN 5
Cr of 2.52, discharged on 1.98, CKD IV, baseline creatinine 1.5 range  - Cr improving, back to baseline if not better  - renal US negative for hydro; renal parenchymal disease  - UTI treated as above as above  - Nephrology consulted, recs appreciated  - resume torsemide 20mg PO BID  - renally dose meds to GFR, avoid nephrotoxic agents Cr of 2.52, discharged on 1.98, CKD IV, baseline creatinine 1.5 range  - Cr improving, back to baseline if not better  - renal US negative for hydro; renal parenchymal disease  - UTI treated as above as above  - Nephrology consulted, recs appreciated  - resumed torsemide 20mg PO BID  - renally dose meds to GFR, avoid nephrotoxic agents

## 2023-07-07 NOTE — DISCHARGE NOTE NURSING/CASE MANAGEMENT/SOCIAL WORK - PATIENT PORTAL LINK FT
You can access the FollowMyHealth Patient Portal offered by [facility] by registering at the following website: [email or web address]. By joining [employer]’s FollowMyHealth portal, you will also be able to view your health information using other applications (apps) compatible with our system.

## 2023-07-07 NOTE — PROGRESS NOTE ADULT - PROBLEM SELECTOR PLAN 2
- Doppler venous b/l LE noted to show acute bilateral above knee DVT. suspect provoked in setting of recent surgery/immobilization  - TTE with RV volume and pressure overload noted  - c/w eliquis 5mg BID with no run in - discusesd with clinical pharmacist  - Hematology recs appreciated, no need for IVC filter placement at this time given can tolerate full a/c  - will hold off CTA chest to eval for PE as will not  and patient with baseline CKD - duplex of LE revealed acute bilateral above knee DVT. suspect provoked in setting of recent surgery/immobilization  - TTE with RV volume and pressure overload noted  - c/w eliquis 5mg BID with no run in given renal fxn/age - discussed with clinical pharmacist  - Hematology recs appreciated, no need for IVC filter placement at this time given can tolerate full a/c  - will hold off CTA chest to eval for PE as will not  and patient with baseline CKD

## 2023-07-07 NOTE — PROGRESS NOTE ADULT - PROBLEM SELECTOR PLAN 3
- appears more euvolemic on exam now  - BNP downtrending  - TTE with EF 61% and RV pressure and overload, mod-severe AR, mod TR  - s/p IV lasix on hold due to worsening Cr (now improved)  - c/w carvedilol 25mg BID  - resume torsemide 20mg PO BID   - strict I/Os, daily weights - appears more euvolemic on exam now  - BNP downtrending  - TTE with EF 61% and RV pressure and overload, mod-severe AR, mod TR  - s/p IV lasix subsequently held due to worsening Cr (now improved)  - c/w carvedilol 25mg BID  - resumed torsemide 20mg PO BID on 7/7  - strict I/Os, daily weights

## 2023-07-08 ENCOUNTER — TRANSCRIPTION ENCOUNTER (OUTPATIENT)
Age: 88
End: 2023-07-08

## 2023-07-08 LAB
ANION GAP SERPL CALC-SCNC: 13 MMOL/L — SIGNIFICANT CHANGE UP (ref 5–17)
BUN SERPL-MCNC: 36 MG/DL — HIGH (ref 7–23)
CALCIUM SERPL-MCNC: 9.2 MG/DL — SIGNIFICANT CHANGE UP (ref 8.4–10.5)
CHLORIDE SERPL-SCNC: 101 MMOL/L — SIGNIFICANT CHANGE UP (ref 96–108)
CO2 SERPL-SCNC: 25 MMOL/L — SIGNIFICANT CHANGE UP (ref 22–31)
CREAT SERPL-MCNC: 1.85 MG/DL — HIGH (ref 0.5–1.3)
CULTURE RESULTS: SIGNIFICANT CHANGE UP
CULTURE RESULTS: SIGNIFICANT CHANGE UP
EGFR: 24 ML/MIN/1.73M2 — LOW
GLUCOSE SERPL-MCNC: 97 MG/DL — SIGNIFICANT CHANGE UP (ref 70–99)
HCT VFR BLD CALC: 30.8 % — LOW (ref 34.5–45)
HGB BLD-MCNC: 9.4 G/DL — LOW (ref 11.5–15.5)
MAGNESIUM SERPL-MCNC: 2.3 MG/DL — SIGNIFICANT CHANGE UP (ref 1.6–2.6)
MCHC RBC-ENTMCNC: 29.3 PG — SIGNIFICANT CHANGE UP (ref 27–34)
MCHC RBC-ENTMCNC: 30.5 GM/DL — LOW (ref 32–36)
MCV RBC AUTO: 96 FL — SIGNIFICANT CHANGE UP (ref 80–100)
NRBC # BLD: 0 /100 WBCS — SIGNIFICANT CHANGE UP (ref 0–0)
NT-PROBNP SERPL-SCNC: HIGH PG/ML (ref 0–300)
PHOSPHATE SERPL-MCNC: 2.8 MG/DL — SIGNIFICANT CHANGE UP (ref 2.5–4.5)
PLATELET # BLD AUTO: 147 K/UL — LOW (ref 150–400)
POTASSIUM SERPL-MCNC: 4.6 MMOL/L — SIGNIFICANT CHANGE UP (ref 3.5–5.3)
POTASSIUM SERPL-SCNC: 4.6 MMOL/L — SIGNIFICANT CHANGE UP (ref 3.5–5.3)
RBC # BLD: 3.21 M/UL — LOW (ref 3.8–5.2)
RBC # FLD: 15.3 % — HIGH (ref 10.3–14.5)
SODIUM SERPL-SCNC: 139 MMOL/L — SIGNIFICANT CHANGE UP (ref 135–145)
SPECIMEN SOURCE: SIGNIFICANT CHANGE UP
SPECIMEN SOURCE: SIGNIFICANT CHANGE UP
WBC # BLD: 6.66 K/UL — SIGNIFICANT CHANGE UP (ref 3.8–10.5)
WBC # FLD AUTO: 6.66 K/UL — SIGNIFICANT CHANGE UP (ref 3.8–10.5)

## 2023-07-08 PROCEDURE — 78582 LUNG VENTILAT&PERFUS IMAGING: CPT | Mod: 26

## 2023-07-08 PROCEDURE — 71045 X-RAY EXAM CHEST 1 VIEW: CPT | Mod: 26

## 2023-07-08 PROCEDURE — 99232 SBSQ HOSP IP/OBS MODERATE 35: CPT

## 2023-07-08 RX ORDER — HYDRALAZINE HCL 50 MG
50 TABLET ORAL EVERY 8 HOURS
Refills: 0 | Status: DISCONTINUED | OUTPATIENT
Start: 2023-07-09 | End: 2023-07-09

## 2023-07-08 RX ORDER — APIXABAN 2.5 MG/1
1 TABLET, FILM COATED ORAL
Qty: 60 | Refills: 0
Start: 2023-07-08 | End: 2023-08-06

## 2023-07-08 RX ORDER — CARVEDILOL PHOSPHATE 80 MG/1
12.5 CAPSULE, EXTENDED RELEASE ORAL EVERY 12 HOURS
Refills: 0 | Status: DISCONTINUED | OUTPATIENT
Start: 2023-07-08 | End: 2023-07-09

## 2023-07-08 RX ADMIN — Medication 1 TABLET(S): at 11:50

## 2023-07-08 RX ADMIN — PANTOPRAZOLE SODIUM 40 MILLIGRAM(S): 20 TABLET, DELAYED RELEASE ORAL at 06:07

## 2023-07-08 RX ADMIN — ATORVASTATIN CALCIUM 40 MILLIGRAM(S): 80 TABLET, FILM COATED ORAL at 21:15

## 2023-07-08 RX ADMIN — Medication 20 MILLIGRAM(S): at 17:50

## 2023-07-08 RX ADMIN — Medication 1 DROP(S): at 06:00

## 2023-07-08 RX ADMIN — AMLODIPINE BESYLATE 10 MILLIGRAM(S): 2.5 TABLET ORAL at 06:01

## 2023-07-08 RX ADMIN — BRIMONIDINE TARTRATE 1 DROP(S): 2 SOLUTION/ DROPS OPHTHALMIC at 17:48

## 2023-07-08 RX ADMIN — POLYETHYLENE GLYCOL 3350 17 GRAM(S): 17 POWDER, FOR SOLUTION ORAL at 11:51

## 2023-07-08 RX ADMIN — BRIMONIDINE TARTRATE 1 DROP(S): 2 SOLUTION/ DROPS OPHTHALMIC at 06:01

## 2023-07-08 RX ADMIN — Medication 1 DROP(S): at 17:48

## 2023-07-08 RX ADMIN — Medication 325 MILLIGRAM(S): at 11:50

## 2023-07-08 RX ADMIN — Medication 20 MILLIGRAM(S): at 06:05

## 2023-07-08 RX ADMIN — APIXABAN 5 MILLIGRAM(S): 2.5 TABLET, FILM COATED ORAL at 17:50

## 2023-07-08 RX ADMIN — CARVEDILOL PHOSPHATE 25 MILLIGRAM(S): 80 CAPSULE, EXTENDED RELEASE ORAL at 06:00

## 2023-07-08 RX ADMIN — CARVEDILOL PHOSPHATE 12.5 MILLIGRAM(S): 80 CAPSULE, EXTENDED RELEASE ORAL at 17:50

## 2023-07-08 RX ADMIN — APIXABAN 5 MILLIGRAM(S): 2.5 TABLET, FILM COATED ORAL at 06:02

## 2023-07-08 NOTE — DISCHARGE NOTE PROVIDER - NSDCFUADDAPPT_GEN_ALL_CORE_FT
APPTS ARE READY TO BE MADE: [X] YES    Best Family or Patient Contact (if needed):    Additional Information about above appointments (if needed):    1: PCP  2:  3:

## 2023-07-08 NOTE — DISCHARGE NOTE PROVIDER - NSDCMRMEDTOKEN_GEN_ALL_CORE_FT
acetaminophen 325 mg oral tablet: 3 tab(s) orally every 8 hours  amLODIPine 10 mg oral tablet: 1 orally once a day  apixaban 2.5 mg oral tablet: 1 tab(s) orally 2 times a day x 6 weeks post operative  for DVT ppx  apixaban 5 mg oral tablet: 1 tab(s) orally every 12 hours  bisacodyl 10 mg rectal suppository: 1 suppository(ies) rectal once a day As needed If no bowel movement  brimonidine 0.2% ophthalmic solution: 1 in each affected eye 2 times a day to each eye  calcium-vitamin D 500 mg-5 mcg (200 intl units) oral tablet: 1 tab(s) orally once a day  cholecalciferol oral tablet: 2000 unit(s) orally once a day  Coreg 25 mg oral tablet: 1 orally 2 times a day  ferrous sulfate 325 mg (65 mg elemental iron) oral tablet: 1 tab(s) orally once a day  hydrALAZINE 100 mg oral tablet: 1 orally 3 times a day  magnesium hydroxide 8% oral suspension: 30 milliliter(s) orally once a day As needed Constipation  melatonin 3 mg oral tablet: 1 tab(s) orally once a day (at bedtime) As needed Insomnia  pantoprazole 40 mg oral delayed release tablet: 1 tab(s) orally once a day (before a meal)  polyethylene glycol 3350 oral powder for reconstitution: 17 gram(s) orally once a day  senna leaf extract oral tablet: 2 tab(s) orally once a day (at bedtime)  simvastatin 10 mg oral tablet: 1 orally once a day (at bedtime)  timolol maleate 0.5% ophthalmic solution: 1 in each affected eye 2 times a day Each eye  torsemide 20 mg oral tablet: 1 tablet orally 2 times a day TO BE RESTARTED  ON 4/21/23 as per neprhology. (Hold 4/19 and 4/20)  traMADol 50 mg oral tablet: 1 tab(s) orally every 6 hours As needed Severe Pain (7 - 10)  traMADol 50 mg oral tablet: 0.5 tab(s) orally every 6 hours As needed Moderate Pain (4 - 6)   acetaminophen 325 mg oral tablet: 3 tab(s) orally every 8 hours  apixaban 5 mg oral tablet: 1 tab(s) orally every 12 hours  bisacodyl 10 mg rectal suppository: 1 suppository(ies) rectal once a day As needed If no bowel movement  brimonidine 0.2% ophthalmic solution: 1 in each affected eye 2 times a day to each eye  calcium-vitamin D 500 mg-5 mcg (200 intl units) oral tablet: 1 tab(s) orally once a day  cholecalciferol oral tablet: 2000 unit(s) orally once a day  ferrous sulfate 325 mg (65 mg elemental iron) oral tablet: 1 tab(s) orally once a day  magnesium hydroxide 8% oral suspension: 30 milliliter(s) orally once a day As needed Constipation  melatonin 3 mg oral tablet: 1 tab(s) orally once a day (at bedtime) As needed Insomnia  pantoprazole 40 mg oral delayed release tablet: 1 tab(s) orally once a day (before a meal)  polyethylene glycol 3350 oral powder for reconstitution: 17 gram(s) orally once a day  senna leaf extract oral tablet: 2 tab(s) orally once a day (at bedtime)  simvastatin 10 mg oral tablet: 1 orally once a day (at bedtime)  timolol maleate 0.5% ophthalmic solution: 1 in each affected eye 2 times a day Each eye  torsemide 20 mg oral tablet: 1 tab(s) orally every 12 hours  traMADol 50 mg oral tablet: 1 tab(s) orally every 6 hours As needed Severe Pain (7 - 10)  traMADol 50 mg oral tablet: 0.5 tab(s) orally every 6 hours As needed Moderate Pain (4 - 6)   acetaminophen 325 mg oral tablet: 3 tab(s) orally every 8 hours  apixaban 5 mg oral tablet: 1 tab(s) orally every 12 hours  bisacodyl 10 mg rectal suppository: 1 suppository(ies) rectal once a day As needed If no bowel movement  brimonidine 0.2% ophthalmic solution: 1 in each affected eye 2 times a day to each eye  calcium-vitamin D 500 mg-5 mcg (200 intl units) oral tablet: 1 tab(s) orally once a day  carvedilol 6.25 mg oral tablet: 1 tab(s) orally every 12 hours  cholecalciferol oral tablet: 2000 unit(s) orally once a day  ferrous sulfate 325 mg (65 mg elemental iron) oral tablet: 1 tab(s) orally once a day  hydrALAZINE 25 mg oral tablet: 1 tab(s) orally 3 times a day  magnesium hydroxide 8% oral suspension: 30 milliliter(s) orally once a day As needed Constipation  melatonin 3 mg oral tablet: 1 tab(s) orally once a day (at bedtime) As needed Insomnia  pantoprazole 40 mg oral delayed release tablet: 1 tab(s) orally once a day (before a meal)  polyethylene glycol 3350 oral powder for reconstitution: 17 gram(s) orally once a day  senna leaf extract oral tablet: 2 tab(s) orally once a day (at bedtime)  simvastatin 10 mg oral tablet: 1 orally once a day (at bedtime)  timolol maleate 0.5% ophthalmic solution: 1 in each affected eye 2 times a day Each eye  torsemide 20 mg oral tablet: 1 tab(s) orally every 12 hours  traMADol 50 mg oral tablet: 1 tab(s) orally every 6 hours As needed Severe Pain (7 - 10)  traMADol 50 mg oral tablet: 0.5 tab(s) orally every 6 hours As needed Moderate Pain (4 - 6)

## 2023-07-08 NOTE — PROGRESS NOTE ADULT - PROBLEM SELECTOR PLAN 5
Cr of 2.52, discharged on 1.98, CKD IV, baseline creatinine 1.6-1.9  range  - Cr improving, back to baseline if not better  - renal US negative for hydro; renal parenchymal disease  - UTI treated as above as above  - Nephrology consulted, recs appreciated  - resumed torsemide 20mg PO BID  - renally dose meds to GFR, avoid nephrotoxic agents

## 2023-07-08 NOTE — PROGRESS NOTE ADULT - SUBJECTIVE AND OBJECTIVE BOX
[facility]   [phone]  RENAL FOLLOW UP NOTE  --------------------------------------------------------------------------------  Chief Complaint:    24 hour events/subjective:    patient seen and examined.   denies sob    PAST HISTORY  --------------------------------------------------------------------------------  No significant changes to PMH, PSH, FHx, SHx, unless otherwise noted    ALLERGIES & MEDICATIONS  --------------------------------------------------------------------------------  Allergies    No Known Allergies    Intolerances      Standing Inpatient Medications  amLODIPine   Tablet 10 milliGRAM(s) Oral daily  apixaban 5 milliGRAM(s) Oral every 12 hours  atorvastatin 40 milliGRAM(s) Oral at bedtime  brimonidine 0.2% Ophthalmic Solution 1 Drop(s) Both EYES two times a day  calcium carbonate 1250 mG  + Vitamin D (OsCal 500 + D) 1 Tablet(s) Oral daily  carvedilol 25 milliGRAM(s) Oral every 12 hours  ferrous    sulfate 325 milliGRAM(s) Oral daily  hydrALAZINE 100 milliGRAM(s) Oral three times a day  pantoprazole    Tablet 40 milliGRAM(s) Oral before breakfast  polyethylene glycol 3350 17 Gram(s) Oral daily  senna 2 Tablet(s) Oral at bedtime  timolol 0.5% Solution 1 Drop(s) Both EYES two times a day  torsemide 20 milliGRAM(s) Oral every 12 hours    PRN Inpatient Medications  acetaminophen     Tablet .. 650 milliGRAM(s) Oral every 6 hours PRN  aluminum hydroxide/magnesium hydroxide/simethicone Suspension 30 milliLiter(s) Oral every 4 hours PRN  bisacodyl Suppository 10 milliGRAM(s) Rectal daily PRN  melatonin 3 milliGRAM(s) Oral at bedtime PRN  traMADol 50 milliGRAM(s) Oral every 6 hours PRN  traMADol 25 milliGRAM(s) Oral every 6 hours PRN      REVIEW OF SYSTEMS  --------------------------------------------------------------------------------    Gen: denies fevers/chills,  CVS: denies chest pain/palpitations  Resp: denies SOB/Cough  GI: Denies N/V/Abd pain  : Denies dysuria/oliguria/hematuria    VITALS/PHYSICAL EXAM  --------------------------------------------------------------------------------  T(C): 36.4 (07-08-23 @ 11:50), Max: 36.4 (07-07-23 @ 20:22)  HR: 53 (07-08-23 @ 11:50) (52 - 56)  BP: 101/51 (07-08-23 @ 11:50) (90/51 - 108/66)  RR: 18 (07-08-23 @ 11:50) (16 - 18)  SpO2: 92% (07-08-23 @ 11:50) (91% - 98%)  Wt(kg): --        07-07-23 @ 07:01  -  07-08-23 @ 07:00  --------------------------------------------------------  IN: 1140 mL / OUT: 550 mL / NET: 590 mL    07-08-23 @ 07:01  -  07-08-23 @ 13:18  --------------------------------------------------------  IN: 240 mL / OUT: 200 mL / NET: 40 mL      Physical Exam:  	  	Gen: Non toxic comfortable appearing   	Pulm: decrease bs  no rales or ronchi or wheezing  	CV: +JVD. RRR, S1S2; no rub  	Abd: +BS, soft, nontender/nondistended  	: No suprapubic tenderness  	UE: Warm, no cyanosis  no clubbing,  no edema;  	LE: Warm, no cyanosis  no clubbing, 1+ edema    LABS/STUDIES  --------------------------------------------------------------------------------              9.4    6.66  >-----------<  147      [07-08-23 @ 06:09]              30.8     139  |  101  |  36  ----------------------------<  97      [07-08-23 @ 06:09]  4.6   |  25  |  1.85        Ca     9.2     [07-08-23 @ 06:09]      Mg     2.3     [07-08-23 @ 06:09]      Phos  2.8     [07-08-23 @ 06:09]        PTT: 37.0       [07-06-23 @ 20:30]      Creatinine Trend:  SCr 1.85 [07-08 @ 06:09]  SCr 1.42 [07-07 @ 06:48]  SCr 1.72 [07-06 @ 05:28]  SCr 2.01 [07-05 @ 05:34]  SCr 2.13 [07-04 @ 07:05]              Urinalysis - [07-08-23 @ 06:09]      Color  / Appearance  / SG  / pH       Gluc 97 / Ketone   / Bili  / Urobili        Blood  / Protein  / Leuk Est  / Nitrite       RBC  / WBC  / Hyaline  / Gran  / Sq Epi  / Non Sq Epi  / Bacteria       Iron 36, TIBC 214, %sat 17      [07-06-23 @ 05:29]  Ferritin 232      [07-06-23 @ 05:29]  PTH -- (Ca 9.1)      [04-18-23 @ 07:37]   128  TSH 5.03      [07-02-23 @ 22:53]

## 2023-07-08 NOTE — PROGRESS NOTE ADULT - PROBLEM SELECTOR PLAN 7
S/P an open reduction and internal fixation of the left hip from April 14, 2023  - c/w Tramadol PRN for moderate/severe pain (taking it at home of discharge)  - was on Eliquis 2.5 mg BID for 6 weeks for post-operative for DVT ppx

## 2023-07-08 NOTE — CHART NOTE - NSCHARTNOTEFT_GEN_A_CORE
Patient requiring home oxygen continuously due to Heart failure, ambulating on room air 87%, ambulation at rest on room air 89%, ambulation with 2lnc 98%.

## 2023-07-08 NOTE — DISCHARGE NOTE PROVIDER - CARE PROVIDER_API CALL
Juanita Marie  [facility]  [address] [place] [address]  Phone: [phone]  Fax: [phone]  Follow Up Time: 1 week

## 2023-07-08 NOTE — PROGRESS NOTE ADULT - PROBLEM SELECTOR PLAN 1
presents with dyspnea on exertion likely 2/2 CHf exacerbation +/- ?PE given known acute DVT  - pro-BNP elevated to 59K on admission, improving  - trops 109-->116-->88, EKG with 1st deg AV block, QTc prolongation but no ST-T changes  - CXR with no pleural effusion/consolidation  - s/p IV lasix now, held for KAYLA, with now PO diuretics resumed  - duplex of LE revealed acute bilateral above knee DVT. suspect provoked in setting of recent surgery/immobilization  - hold off CTA chest to evaluate for PE given KAYLA on CKD and as it would not  at this time  - TTE with EF 61% and RV pressure and overload, mod-severe AR, mod TR  - desatting on RA requiring supplemental O2, SpO2 87% on RA with ambulation - will need home O2 set-up prior to d/c  - will check VQ scan to evaluate for PE

## 2023-07-08 NOTE — PROGRESS NOTE ADULT - PROBLEM SELECTOR PLAN 6
BP marginal  - decrease carvedilol to 12.5mg q12h  - decrease hydralazine to 50mg TID  - stop amlodipine  - monitor vitals

## 2023-07-08 NOTE — PROGRESS NOTE ADULT - NSPROGADDITIONALINFOA_GEN_ALL_CORE
.  aMriia Blanco MD  Division of Hospital Medicine  [facility]   Available on [employer] Teams - messages preferred prior to calls.    Plan discussed with patient, michelle Wilkerson via phone, Nephrology NP John, and medicine NP Devorah.

## 2023-07-08 NOTE — DISCHARGE NOTE PROVIDER - HOSPITAL COURSE
89 yo F with PMH of HTN, HLD, CKD IV recent admit in April 2023 left hip fracture status post repair (On Eliquis for DVT PPx) and subsequent rehab admission who presents with shortness of breath likely 2/2 CHF exac in setting of acute b/l DVT in setting of recent surgery/immobilization now transitioned to PO eliquis.     Acute on chronic diastolic congestive heart failure.   ·  Plan: - appears more euvolemic on exam now  - BNP downtrending  - TTE with EF 61% and RV pressure and overload, mod-severe AR, mod TR  - s/p IV lasix subsequently held due to worsening Cr (now improved)  - decrease carvedilol to 12.5mg BID  - resumed torsemide 20mg PO BID on 7/7  - strict I/Os, daily weights.    Dyspnea   ·  Plan: presents with dyspnea on exertion likely 2/2 CHf exacerbation +/- ?PE given known acute DVT  - pro-BNP elevated to 59K on admission, improving  - trops 109-->116-->88, EKG with 1st deg AV block, QTc prolongation but no ST-T changes  - CXR with no pleural effusion/consolidation  - s/p IV lasix now, held for KAYLA, with now PO diuretics resumed  - duplex of LE revealed acute bilateral above knee DVT. suspect provoked in setting of recent surgery/immobilization-> now on eliquis   - hold off CTA chest to evaluate for PE given KAYLA on CKD and as it would not  at this time  - TTE with EF 61% and RV pressure and overload, mod-severe AR, mod TR  - desatting on RA requiring supplemental O2, SpO2 87% on RA with ambulation - will need home O2 set-up prior to d/c   VQ scan to evaluate for PE-> abnormal       Acute deep vein thrombosis (DVT).   ·  Plan: - duplex of LE revealed acute bilateral above knee DVT. suspect provoked in setting of recent surgery/immobilization  - TTE with RV volume and pressure overload noted  - c/w eliquis 5mg BID with no run in given renal fxn/age - discussed with clinical pharmacist  - Hematology recs appreciated, no need for IVC filter placement at this time given can tolerate full a/c  - will hold off CTA chest to eval for PE as will not  and patient with baseline CKD     Acute UTI.   ·  Plan: UA positive   - urine cx growing Klebsiella oxytoca, sensitivities noted  - completed ceftriaxone x 3 day course on 7/5  - blood cx - NGTD.     Acute kidney injury superimposed on CKD.   ·  Plan: Cr of 2.52, discharged on 1.98, CKD IV, baseline creatinine 1.6-1.9  range  - Cr improving, back to baseline if not better  - renal US negative for hydro; renal parenchymal disease  - UTI treated as above as above  - Nephrology consulted, recs appreciated  - resumed torsemide 20mg PO BID  - renally dose meds to GFR, avoid nephrotoxic agents.     HTN (hypertension).   ·  Plan: BP marginal  - decrease carvedilol to 12.5mg q12h  - decrease hydralazine to 50mg TID  - stop amlodipine  - monitor vitals.       Fracture of left hip requiring operative repair.   ·  Plan: S/P an open reduction and internal fixation of the left hip from April 14, 2023  - c/w Tramadol PRN for moderate/severe pain (taking it at home of discharge)  - was on Eliquis 2.5 mg BID for 6 weeks for post-operative for DVT ppx.      Dispo: PT recs [facility] but patient and son want home with home PT. 89 yo F with PMH of HTN, HLD, CKD IV recent admit in April 2023 left hip fracture status post repair (On Eliquis for DVT PPx) and subsequent rehab admission who presents with shortness of breath likely 2/2 CHF exac in setting of acute b/l DVT in setting of recent surgery/immobilization now transitioned to PO eliquis.     Acute on chronic diastolic congestive heart failure.   ·  Plan: - appears more euvolemic on exam now  - BNP downtrending  - TTE with EF 61% and RV pressure and overload, mod-severe AR, mod TR  - s/p IV lasix subsequently held due to worsening Cr (now improved)  - decrease carvedilol to 12.5mg BID  - resumed torsemide 20mg PO BID on 7/7  - strict I/Os, daily weights.    Dyspnea   ·  Plan: presents with dyspnea on exertion likely 2/2 CHf exacerbation +/- ?PE given known acute DVT  - pro-BNP elevated to 59K on admission, improving  - trops 109-->116-->88, EKG with 1st deg AV block, QTc prolongation but no ST-T changes  - CXR with no pleural effusion/consolidation  - s/p IV lasix now, held for KAYLA, with now PO diuretics resumed  - duplex of LE revealed acute bilateral above knee DVT. suspect provoked in setting of recent surgery/immobilization-> now on eliquis   - hold off CTA chest to evaluate for PE given KAYLA on CKD and as it would not  at this time  - TTE with EF 61% and RV pressure and overload, mod-severe AR, mod TR  - desatting on RA requiring supplemental O2, SpO2 87% on RA with ambulation - will need home O2 set-up prior to d/c   VQ scan to evaluate for PE-> abnormal       Acute deep vein thrombosis (DVT).   ·  Plan: - duplex of LE revealed acute bilateral above knee DVT. suspect provoked in setting of recent surgery/immobilization  - TTE with RV volume and pressure overload noted  - c/w eliquis 5mg BID with no run in given renal fxn/age - discussed with clinical pharmacist  - Hematology recs appreciated, no need for IVC filter placement at this time given can tolerate full a/c  - will hold off CTA chest to eval for PE as will not  and patient with baseline CKD     Acute UTI.   ·  Plan: UA positive   - urine cx growing Klebsiella oxytoca, sensitivities noted  - completed ceftriaxone x 3 day course on 7/5  - blood cx - NGTD.     Acute kidney injury superimposed on CKD.   ·  Plan: Cr of 2.52, discharged on 1.98, CKD IV, baseline creatinine 1.6-1.9  range  - Cr improving, back to baseline if not better  - renal US negative for hydro; renal parenchymal disease  - UTI treated as above as above  - Nephrology consulted, recs appreciated  - resumed torsemide 20mg PO BID  - renally dose meds to GFR, avoid nephrotoxic agents.     HTN (hypertension).   ·  Plan: BP marginal  - decrease carvedilol to 12.5mg q12h  - decrease hydralazine to 50mg TID  - stop amlodipine  - monitor vitals.       Fracture of left hip requiring operative repair.   ·  Plan: S/P an open reduction and internal fixation of the left hip from April 14, 2023  - c/w Tramadol PRN for moderate/severe pain (taking it at home of discharge)  - was on Eliquis 2.5 mg BID for 6 weeks for post-operative for DVT ppx.      Dispo: PT recs [facility] but patient and son want home with home PT.      Patient is medically cleared for discharge. Medication reconciliation reviewed, revised, and resolved with Dr. Daniels who had medically cleared patient for discharge with follow-up as advised. Please refer to discharge note for detailed hospital course. Patient is currently stable for discharge to home at this time.

## 2023-07-08 NOTE — PROGRESS NOTE ADULT - PROBLEM SELECTOR PLAN 3
- appears more euvolemic on exam now  - BNP downtrending  - TTE with EF 61% and RV pressure and overload, mod-severe AR, mod TR  - s/p IV lasix subsequently held due to worsening Cr (now improved)  - decrease carvedilol to 12.5mg BID  - resumed torsemide 20mg PO BID on 7/7  - strict I/Os, daily weights

## 2023-07-08 NOTE — PROGRESS NOTE ADULT - PROBLEM SELECTOR PLAN 2
- duplex of LE revealed acute bilateral above knee DVT. suspect provoked in setting of recent surgery/immobilization  - TTE with RV volume and pressure overload noted  - c/w eliquis 5mg BID with no run in given renal fxn/age - discussed with clinical pharmacist  - Hematology recs appreciated, no need for IVC filter placement at this time given can tolerate full a/c  - will hold off CTA chest to eval for PE as will not  and patient with baseline CKD  - however given hew supplemental O2 requirements. will check VQ scan to eval for PE

## 2023-07-08 NOTE — PROGRESS NOTE ADULT - SUBJECTIVE AND OBJECTIVE BOX
Mariia Blanco MD  Division of Hospital Medicine  [facility]   Available on Microsoft Teams (Mon-Fri 8am-5pm)    * messages preferred prior to calls  Other Times:  [phone]      Patient is a 89y old  Female who presents with a chief complaint of dyspnea (08 Jul 2023 13:17)      SUBJECTIVE / OVERNIGHT EVENTS: dyspnea overnight requiring supplemental O2. resting O2 sat low 90s but desats to 87% with ambulation. will need home O2 on discharge. patient otherwise feels okay. no dizziness, chest pain, abd pain, n/v.  ADDITIONAL REVIEW OF SYSTEMS:    MEDICATIONS  (STANDING):  apixaban 5 milliGRAM(s) Oral every 12 hours  atorvastatin 40 milliGRAM(s) Oral at bedtime  brimonidine 0.2% Ophthalmic Solution 1 Drop(s) Both EYES two times a day  calcium carbonate 1250 mG  + Vitamin D (OsCal 500 + D) 1 Tablet(s) Oral daily  carvedilol 25 milliGRAM(s) Oral every 12 hours  ferrous    sulfate 325 milliGRAM(s) Oral daily  pantoprazole    Tablet 40 milliGRAM(s) Oral before breakfast  polyethylene glycol 3350 17 Gram(s) Oral daily  senna 2 Tablet(s) Oral at bedtime  timolol 0.5% Solution 1 Drop(s) Both EYES two times a day  torsemide 20 milliGRAM(s) Oral every 12 hours    MEDICATIONS  (PRN):  acetaminophen     Tablet .. 650 milliGRAM(s) Oral every 6 hours PRN Temp greater or equal to 38C (100.4F), Mild Pain (1 - 3)  aluminum hydroxide/magnesium hydroxide/simethicone Suspension 30 milliLiter(s) Oral every 4 hours PRN Dyspepsia  bisacodyl Suppository 10 milliGRAM(s) Rectal daily PRN Constipation  melatonin 3 milliGRAM(s) Oral at bedtime PRN Insomnia  traMADol 50 milliGRAM(s) Oral every 6 hours PRN Severe Pain (7 - 10)  traMADol 25 milliGRAM(s) Oral every 6 hours PRN Moderate Pain (4 - 6)      CAPILLARY BLOOD GLUCOSE        I&O's Summary    07 Jul 2023 07:01  -  08 Jul 2023 07:00  --------------------------------------------------------  IN: 1140 mL / OUT: 550 mL / NET: 590 mL    08 Jul 2023 07:01  -  08 Jul 2023 17:10  --------------------------------------------------------  IN: 480 mL / OUT: 200 mL / NET: 280 mL        PHYSICAL EXAM:  Vital Signs Last 24 Hrs  T(C): 36.4 (08 Jul 2023 11:50), Max: 36.4 (07 Jul 2023 20:22)  T(F): 97.5 (08 Jul 2023 11:50), Max: 97.5 (07 Jul 2023 20:22)  HR: 53 (08 Jul 2023 11:50) (52 - 56)  BP: 101/51 (08 Jul 2023 11:50) (90/51 - 108/66)  BP(mean): --  RR: 18 (08 Jul 2023 11:50) (16 - 18)  SpO2: 87% (08 Jul 2023 14:00) (87% - 98%)    Parameters below as of 08 Jul 2023 14:00  Patient On (Oxygen Delivery Method): room air    CONSTITUTIONAL: pleasant elderly female in NAD  EYES: PERRLA; conjunctiva and sclera clear  ENMT: Moist oral mucosa, no pharyngeal injection or exudates; normal dentition  NECK: Supple, no palpable masses; no thyromegaly  RESPIRATORY: Normal respiratory effort; lungs are clear to auscultation bilaterally, no crackles  CARDIOVASCULAR: Regular rate and rhythm, normal S1 and S2, no murmur/rub/gallop; +trace LE edema b/l  ABDOMEN: Soft, Nondistended, Nontender to palpation, normoactive bowel sounds  MUSCULOSKELETAL: No clubbing or cyanosis of digits; no joint swelling or tenderness to palpation  PSYCH: A+O to person, place, and time; affect appropriate  NEUROLOGY: CN 2-12 are intact and symmetric; no gross sensory deficits   SKIN: No rashes; no palpable lesions      LABS:                        9.4    6.66  )-----------( 147      ( 08 Jul 2023 06:09 )             30.8     07-08    139  |  101  |  36<H>  ----------------------------<  97  4.6   |  25  |  1.85<H>    Ca    9.2      08 Jul 2023 06:09  Phos  2.8     07-08  Mg     2.3     07-08      PTT - ( 06 Jul 2023 20:30 )  PTT:37.0 sec      Urinalysis Basic - ( 08 Jul 2023 06:09 )    Color: x / Appearance: x / SG: x / pH: x  Gluc: 97 mg/dL / Ketone: x  / Bili: x / Urobili: x   Blood: x / Protein: x / Nitrite: x   Leuk Esterase: x / RBC: x / WBC x   Sq Epi: x / Non Sq Epi: x / Bacteria: x      RADIOLOGY & ADDITIONAL TESTS:  Results Reviewed: no leukocytosis, H/H stable, Cr uptrending  Imaging Personally Reviewed:  Electrocardiogram Personally Reviewed:    COORDINATION OF CARE:  Care Discussed with Consultants/Other Providers [Y]: Nephrology NP John, medicine NP Devorah  Prior or Outpatient Records Reviewed [Y/N]:

## 2023-07-08 NOTE — CHART NOTE - NSCHARTNOTEFT_GEN_A_CORE
Patient s/p VQ scan for r/o PE iso increased oxygen requirement today.   Notified by Nuclear Medicine Attending, s/p scan with result of high probability for PE.\  Patient currently on Eliquis 5mg BID.     Jaida Vickers PA-C  Dept. of Medicine  Spectra x [phone] Patient s/p VQ scan for r/o PE iso +DVT and increased oxygen requirement today.   Notified by Nuclear Medicine Attending, s/p scan with abnormal result of high probability for PE.  Patient seen at bedside, in NAD, offers no complaints. Denies chest pain, SOB, palpitations. VSS as below. Saturating well on 1LNC.   Patient currently on Eliquis 5mg BID. C/w Eliquis.    Will order repeat troponin.   Will continue to monitor patient for hemodynamic stability.   If patient becomes hemodynamically unstable/acute change, will reach out to PERC team for any recommendations.   [facility]   Will endorse to AM team. Attending to follow.     Vital Signs Last 24 Hrs  T(C): 36.5 (09 Jul 2023 00:02), Max: 36.5 (09 Jul 2023 00:02)  T(F): 97.7 (09 Jul 2023 00:02), Max: 97.7 (09 Jul 2023 00:02)  HR: 56 (09 Jul 2023 00:02) (52 - 56)  BP: 124/54 (09 Jul 2023 00:02) (101/51 - 124/54)  BP(mean): 77 (09 Jul 2023 00:02) (77 - 77)  RR: 18 (09 Jul 2023 00:02) (18 - 18)  SpO2: 97% (09 Jul 2023 00:02) (87% - 98%)    Parameters below as of 09 Jul 2023 00:02  Patient On (Oxygen Delivery Method): nasal cannula  O2 Flow (L/min): 1      Jaida Vickers PA-C  Dept. of Medicine  Spectra x [phone] Patient s/p VQ scan for r/o PE iso +DVT and increased oxygen requirement today.   Notified by Nuclear Medicine Attending, s/p scan with abnormal result of high probability for PE.  Patient seen at bedside, in NAD, offers no complaints. Denies chest pain, SOB, palpitations. VSS as below. Saturating well on 1LNC.   Patient currently on Eliquis 5mg BID. C/w Eliquis.    Will order repeat troponin.   Will continue to monitor patient for hemodynamic stability.   If patient becomes hemodynamically unstable/acute change, will reach out to PE response team for any recommendations.   [facility]   Will endorse to AM team. Attending to follow.     Vital Signs Last 24 Hrs  T(C): 36.5 (09 Jul 2023 00:02), Max: 36.5 (09 Jul 2023 00:02)  T(F): 97.7 (09 Jul 2023 00:02), Max: 97.7 (09 Jul 2023 00:02)  HR: 56 (09 Jul 2023 00:02) (52 - 56)  BP: 124/54 (09 Jul 2023 00:02) (101/51 - 124/54)  BP(mean): 77 (09 Jul 2023 00:02) (77 - 77)  RR: 18 (09 Jul 2023 00:02) (18 - 18)  SpO2: 97% (09 Jul 2023 00:02) (87% - 98%)    Parameters below as of 09 Jul 2023 00:02  Patient On (Oxygen Delivery Method): nasal cannula  O2 Flow (L/min): 1      Jaida Vickers PA-C  Dept. of Medicine  Spectra x [phone]

## 2023-07-09 LAB
ANION GAP SERPL CALC-SCNC: 11 MMOL/L — SIGNIFICANT CHANGE UP (ref 5–17)
BUN SERPL-MCNC: 40 MG/DL — HIGH (ref 7–23)
CALCIUM SERPL-MCNC: 9.1 MG/DL — SIGNIFICANT CHANGE UP (ref 8.4–10.5)
CHLORIDE SERPL-SCNC: 100 MMOL/L — SIGNIFICANT CHANGE UP (ref 96–108)
CO2 SERPL-SCNC: 26 MMOL/L — SIGNIFICANT CHANGE UP (ref 22–31)
CREAT SERPL-MCNC: 1.9 MG/DL — HIGH (ref 0.5–1.3)
EGFR: 24 ML/MIN/1.73M2 — LOW
GLUCOSE SERPL-MCNC: 97 MG/DL — SIGNIFICANT CHANGE UP (ref 70–99)
MAGNESIUM SERPL-MCNC: 2.4 MG/DL — SIGNIFICANT CHANGE UP (ref 1.6–2.6)
PHOSPHATE SERPL-MCNC: 2.7 MG/DL — SIGNIFICANT CHANGE UP (ref 2.5–4.5)
POTASSIUM SERPL-MCNC: 4.8 MMOL/L — SIGNIFICANT CHANGE UP (ref 3.5–5.3)
POTASSIUM SERPL-SCNC: 4.8 MMOL/L — SIGNIFICANT CHANGE UP (ref 3.5–5.3)
SODIUM SERPL-SCNC: 137 MMOL/L — SIGNIFICANT CHANGE UP (ref 135–145)
TROPONIN T, HIGH SENSITIVITY RESULT: 49 NG/L — SIGNIFICANT CHANGE UP (ref 0–51)

## 2023-07-09 PROCEDURE — 93010 ELECTROCARDIOGRAM REPORT: CPT

## 2023-07-09 PROCEDURE — 99233 SBSQ HOSP IP/OBS HIGH 50: CPT

## 2023-07-09 RX ORDER — CARVEDILOL PHOSPHATE 80 MG/1
6.25 CAPSULE, EXTENDED RELEASE ORAL EVERY 12 HOURS
Refills: 0 | Status: DISCONTINUED | OUTPATIENT
Start: 2023-07-09 | End: 2023-07-10

## 2023-07-09 RX ORDER — HYDRALAZINE HCL 50 MG
25 TABLET ORAL
Refills: 0 | Status: DISCONTINUED | OUTPATIENT
Start: 2023-07-09 | End: 2023-07-10

## 2023-07-09 RX ADMIN — BRIMONIDINE TARTRATE 1 DROP(S): 2 SOLUTION/ DROPS OPHTHALMIC at 18:51

## 2023-07-09 RX ADMIN — Medication 50 MILLIGRAM(S): at 05:08

## 2023-07-09 RX ADMIN — Medication 325 MILLIGRAM(S): at 12:31

## 2023-07-09 RX ADMIN — APIXABAN 5 MILLIGRAM(S): 2.5 TABLET, FILM COATED ORAL at 05:07

## 2023-07-09 RX ADMIN — Medication 20 MILLIGRAM(S): at 18:53

## 2023-07-09 RX ADMIN — Medication 1 TABLET(S): at 12:32

## 2023-07-09 RX ADMIN — Medication 1 DROP(S): at 05:07

## 2023-07-09 RX ADMIN — CARVEDILOL PHOSPHATE 12.5 MILLIGRAM(S): 80 CAPSULE, EXTENDED RELEASE ORAL at 05:08

## 2023-07-09 RX ADMIN — Medication 1 DROP(S): at 18:51

## 2023-07-09 RX ADMIN — APIXABAN 5 MILLIGRAM(S): 2.5 TABLET, FILM COATED ORAL at 18:51

## 2023-07-09 RX ADMIN — Medication 20 MILLIGRAM(S): at 05:08

## 2023-07-09 RX ADMIN — Medication 25 MILLIGRAM(S): at 18:49

## 2023-07-09 RX ADMIN — PANTOPRAZOLE SODIUM 40 MILLIGRAM(S): 20 TABLET, DELAYED RELEASE ORAL at 05:08

## 2023-07-09 RX ADMIN — BRIMONIDINE TARTRATE 1 DROP(S): 2 SOLUTION/ DROPS OPHTHALMIC at 05:07

## 2023-07-09 RX ADMIN — ATORVASTATIN CALCIUM 40 MILLIGRAM(S): 80 TABLET, FILM COATED ORAL at 22:13

## 2023-07-09 RX ADMIN — POLYETHYLENE GLYCOL 3350 17 GRAM(S): 17 POWDER, FOR SOLUTION ORAL at 12:32

## 2023-07-09 NOTE — PROGRESS NOTE ADULT - PROBLEM SELECTOR PLAN 4
UA positive   - urine cx growing Klebsiella oxytoca, sensitivities noted  - completed ceftriaxone x 3 day course on 7/5  - blood cx negative

## 2023-07-09 NOTE — PROGRESS NOTE ADULT - PROBLEM SELECTOR PLAN 1
presents with dyspnea on exertion 2/2 CHF exacerbation and PE   - pro-BNP elevated to 59K on admission, improving  - trops 109-->116-->88, EKG with 1st deg AV block, QTc prolongation but no ST-T changes  - CXR with no pleural effusion/consolidation  - s/p IV lasix now, held for KAYLA, with now PO diuretics resumed  - duplex of LE revealed acute bilateral above knee DVT. suspect provoked in setting of recent surgery/immobilization  - held off CTA chest to evaluate for PE given KAYLA on CKD and as it would not  at this time  - TTE with EF 61% and RV pressure and overload, mod-severe AR, mod TR  - however, new desat on RA requiring supplemental O2, SpO2 87% on RA with ambulation - will need home O2 set-up prior to d/c  - VQ scan confirmed high probability PE

## 2023-07-09 NOTE — PROGRESS NOTE ADULT - PROBLEM SELECTOR PLAN 8
DVT ppx: eliquis for DVT    Dispo: PT recs [facility] but patient and son want home with home PT DVT ppx: eliquis for DVT    Dispo: PT recs [facility] but patient and son want home with home PT  CM working on home O2 set-up, likely to be delivered late tonight vs. early tomorrow

## 2023-07-09 NOTE — PROGRESS NOTE ADULT - NSPROGADDITIONALINFOA_GEN_ALL_CORE
.  Mariia Blanco MD  Division of Hospital Medicine  [facility]   Available on Microsoft Teams - messages preferred prior to calls.    Plan discussed with patient, michelle Wilkerson via phone, Nephrology NP John, and medicine NP Devorah. .  Mariia Blanco MD  Division of Hospital Medicine  [facility]   Available on Microsoft Teams - messages preferred prior to calls.    Anticipate DC home tomorrow pending home O2 delivery and final adjustments of her BP regimen.    Plan discussed with patient, son Rock maxwell, and medicine CRUZ Kaur.

## 2023-07-09 NOTE — PROGRESS NOTE ADULT - SUBJECTIVE AND OBJECTIVE BOX
Mariia Blanco MD  Division of Hospital Medicine  [facility]   Available on Microsoft Teams (Mon-Fri 8am-5pm)    * messages preferred prior to calls  Other Times:  [phone]      Patient is a 89y old  Female who presents with a chief complaint of dyspnea (08 Jul 2023 17:10)      SUBJECTIVE / OVERNIGHT EVENTS: no acute events overnight. dyspnea improving especially with supplemental O2. reports robust UOP with torsemide. no fever, chills, dizziness/lightheadedness, cough, abd pain, n/v, dysuria. overall doing well.  ADDITIONAL REVIEW OF SYSTEMS:    Tele reviewed: SB/SR with HR 55-60s, occ 48 overnight, 7 beats WCT    MEDICATIONS  (STANDING):  apixaban 5 milliGRAM(s) Oral every 12 hours  atorvastatin 40 milliGRAM(s) Oral at bedtime  brimonidine 0.2% Ophthalmic Solution 1 Drop(s) Both EYES two times a day  calcium carbonate 1250 mG  + Vitamin D (OsCal 500 + D) 1 Tablet(s) Oral daily  carvedilol 6.25 milliGRAM(s) Oral every 12 hours  ferrous    sulfate 325 milliGRAM(s) Oral daily  hydrALAZINE 25 milliGRAM(s) Oral two times a day  pantoprazole    Tablet 40 milliGRAM(s) Oral before breakfast  polyethylene glycol 3350 17 Gram(s) Oral daily  senna 2 Tablet(s) Oral at bedtime  timolol 0.5% Solution 1 Drop(s) Both EYES two times a day  torsemide 20 milliGRAM(s) Oral every 12 hours    MEDICATIONS  (PRN):  acetaminophen     Tablet .. 650 milliGRAM(s) Oral every 6 hours PRN Temp greater or equal to 38C (100.4F), Mild Pain (1 - 3)  aluminum hydroxide/magnesium hydroxide/simethicone Suspension 30 milliLiter(s) Oral every 4 hours PRN Dyspepsia  bisacodyl Suppository 10 milliGRAM(s) Rectal daily PRN Constipation  melatonin 3 milliGRAM(s) Oral at bedtime PRN Insomnia  traMADol 25 milliGRAM(s) Oral every 6 hours PRN Moderate Pain (4 - 6)  traMADol 50 milliGRAM(s) Oral every 6 hours PRN Severe Pain (7 - 10)      CAPILLARY BLOOD GLUCOSE        I&O's Summary    08 Jul 2023 07:01  -  09 Jul 2023 07:00  --------------------------------------------------------  IN: 720 mL / OUT: 600 mL / NET: 120 mL    09 Jul 2023 07:01  -  09 Jul 2023 17:32  --------------------------------------------------------  IN: 365 mL / OUT: 600 mL / NET: -235 mL        PHYSICAL EXAM:  Vital Signs Last 24 Hrs  T(C): 36.6 (09 Jul 2023 12:49), Max: 36.6 (09 Jul 2023 12:49)  T(F): 97.9 (09 Jul 2023 12:49), Max: 97.9 (09 Jul 2023 12:49)  HR: 58 (09 Jul 2023 14:28) (52 - 58)  BP: 103/53 (09 Jul 2023 14:28) (102/56 - 124/54)  BP(mean): 77 (09 Jul 2023 00:02) (77 - 77)  RR: 18 (09 Jul 2023 12:49) (18 - 18)  SpO2: 98% (09 Jul 2023 12:49) (95% - 98%)    Parameters below as of 09 Jul 2023 12:49  Patient On (Oxygen Delivery Method): nasal cannula    CONSTITUTIONAL: pleasant elderly female in NAD  EYES: PERRLA; conjunctiva and sclera clear  ENMT: Moist oral mucosa, no pharyngeal injection or exudates; normal dentition  NECK: Supple, no palpable masses; no thyromegaly  RESPIRATORY: Normal respiratory effort; speaking in full sentences with no accessory muscle use, no crackles on exam, though diminished at bases  CARDIOVASCULAR: +bradycardic, normal S1 and S2, no murmur/rub/gallop; +trace LE edema b/l  ABDOMEN: Soft, Nondistended, Nontender to palpation, normoactive bowel sounds  MUSCULOSKELETAL: No clubbing or cyanosis of digits; no joint swelling or tenderness to palpation  PSYCH: A+O to person, place, and time; affect appropriate  NEUROLOGY: CN 2-12 are intact and symmetric; no gross sensory deficits   SKIN: No rashes; no palpable lesions    LABS:                        9.4    6.66  )-----------( 147      ( 08 Jul 2023 06:09 )             30.8     07-09    137  |  100  |  40<H>  ----------------------------<  97  4.8   |  26  |  1.90<H>    Ca    9.1      09 Jul 2023 05:25  Phos  2.7     07-09  Mg     2.4     07-09      Urinalysis Basic - ( 09 Jul 2023 05:25 )    Color: x / Appearance: x / SG: x / pH: x  Gluc: 97 mg/dL / Ketone: x  / Bili: x / Urobili: x   Blood: x / Protein: x / Nitrite: x   Leuk Esterase: x / RBC: x / WBC x   Sq Epi: x / Non Sq Epi: x / Bacteria: x          RADIOLOGY & ADDITIONAL TESTS:  Results Reviewed: BUN/Cr uptrended but at baseline  Imaging Personally Reviewed:  7/9/23 CXR:  FINDINGS:    The heart is unchanged in size.  There are asymmetric perihilar interstitial opacities left greater than   right  There are no pleural effusions.  There is no pneumothorax.    IMPRESSION:    Asymmetric interstitial opacities left greater than right which may   represent asymmetric pulmonary edema versus infection.    7/9/23 VQ Scan:  FINDINGS: There are multiple mismatched ventilation/perfusion defects in   the apical segment of the right upper lobe, anterior segment of the right   upper lobe, lateral segment of the right middle lobe, and basilar   segments of the right lower lobe with no corresponding chest x-ray   abnormality. There is heterogeneous distribution of radiopharmaceutical   in the remainder of both lungs on the ventilation and perfusion images.    IMPRESSION: Abnormal ventilation/perfusion lung scan. High probability of   pulmonary embolus.    Electrocardiogram Personally Reviewed:    COORDINATION OF CARE:  Care Discussed with Consultants/Other Providers [Y]: Nephrology NP John, medicine PA Vincent  Prior or Outpatient Records Reviewed [Y/N]:

## 2023-07-09 NOTE — PROGRESS NOTE ADULT - PROBLEM SELECTOR PLAN 2
- duplex of LE revealed acute bilateral above knee DVT. suspect provoked in setting of recent surgery/immobilization  - TTE with RV volume and pressure overload noted  - c/w eliquis 5mg BID with no run in given renal fxn/age - discussed with clinical pharmacist  - Hematology recs appreciated, no need for IVC filter placement at this time given can tolerate full a/c  - will hold off CTA chest to eval for PE as will not  and patient with baseline CKD  - however given hew supplemental O2 requirements, VQ scan checked and confirming high probability PE

## 2023-07-09 NOTE — PROGRESS NOTE ADULT - PROBLEM SELECTOR PLAN 7
S/P an open reduction and internal fixation of the left hip from April 14, 2023  - c/w Tramadol PRN for moderate/severe pain (taking it at home of discharge)  - was on Eliquis 2.5 mg BID for 6 weeks for post-operative for DVT ppx S/P an open reduction and internal fixation of the left hip from April 14, 2023  - c/w Tramadol PRN for moderate/severe pain (taking it at home of discharge)  - was on Eliquis 2.5 mg BID for 6 weeks for post-operative for DVT ppx though still developed DVT/PE

## 2023-07-09 NOTE — CHART NOTE - NSCHARTNOTEFT_GEN_A_CORE
Notified by RN for 7 beats WCT, HR to 230 on telemetry, first time event. Patient was resting in bed at time of event.   Pt seen at the bedside. In NAD, asymptomatic. Denies chest pain, SOB, palpitations.      Vital Signs Last 24 Hrs  T(C): 36.4 (09 Jul 2023 03:55), Max: 36.5 (09 Jul 2023 00:02)  T(F): 97.5 (09 Jul 2023 03:55), Max: 97.7 (09 Jul 2023 00:02)  HR: 55 (09 Jul 2023 05:05) (53 - 56)  BP: 116/54 (09 Jul 2023 05:05) (101/51 - 124/54)  BP(mean): 77 (09 Jul 2023 00:02) (77 - 77)  RR: 18 (09 Jul 2023 03:55) (18 - 18)  SpO2: 95% (09 Jul 2023 03:55) (87% - 97%)    Parameters below as of 09 Jul 2023 03:55  Patient On (Oxygen Delivery Method): nasal cannula  O2 Flow (L/min): 1      A/P:   #WCT  - patient asymptomatic and hemodynamically stable  - telemetry reviewed - sinus debbie with first degree heart block, HR mid 40-50s  - EKG done - demonstrates sinus  bradycardia with 1st degree AV block, no acute ST/T changes compared to prior EKG   - Labs: BMP, Mg, Phos - to f/u electrolytes and replete if needed   - C/w monitor on tele   - C/w carvedilol 12.5mg BID  - Will continue to monitor   - Will endorse to AM team. Attending to follow.       Jaida Vickers PA-C  Dept. of Medicine  Spectra x [phone] Notified by RN for 7 beats WCT, HR to 230 on telemetry, first time event. Patient was resting in bed at time of event.   Pt seen at the bedside. In NAD, asymptomatic. Denies chest pain, SOB, palpitations.      Vital Signs Last 24 Hrs  T(C): 36.4 (09 Jul 2023 03:55), Max: 36.5 (09 Jul 2023 00:02)  T(F): 97.5 (09 Jul 2023 03:55), Max: 97.7 (09 Jul 2023 00:02)  HR: 55 (09 Jul 2023 05:05) (53 - 56)  BP: 116/54 (09 Jul 2023 05:05) (101/51 - 124/54)  BP(mean): 77 (09 Jul 2023 00:02) (77 - 77)  RR: 18 (09 Jul 2023 03:55) (18 - 18)  SpO2: 95% (09 Jul 2023 03:55) (87% - 97%)    Parameters below as of 09 Jul 2023 03:55  Patient On (Oxygen Delivery Method): nasal cannula  O2 Flow (L/min): 1      A/P:   #WCT  - patient asymptomatic and hemodynamically stable  - telemetry reviewed - sinus debbie with first degree heart block, HR mid 40-50s  - EKG done - demonstrates sinus  bradycardia with 1st degree AV block, no acute ST/T changes compared to prior EKG   - Labs: BMP, Mg, Phos - to f/u electrolytes and replete if needed   - C/w monitor on tele   - C/w carvedilol 12.5mg BID with hold parameters   - Consider cardiology consult in AM  - Will continue to monitor   - Will endorse to AM team. Attending to follow.       Jaida Vickers PA-C  Dept. of Medicine  Spectra x [phone]

## 2023-07-09 NOTE — PROGRESS NOTE ADULT - PROBLEM SELECTOR PLAN 3
- appears more euvolemic on exam now  - BNP downtrending  - TTE with EF 61% and RV pressure and overload, mod-severe AR, mod TR  - s/p IV lasix subsequently held due to worsening Cr (now improved)  - decrease carvedilol to 12.5mg BID  - resumed torsemide 20mg PO BID on 7/7  - strict I/Os, daily weights - appears more euvolemic on exam now  - BNP downtrending  - TTE with EF 61% and RV pressure and overload, mod-severe AR, mod TR  - s/p IV lasix subsequently held due to worsening Cr (now improved)  - decrease carvedilol to 6.25mg BID, decrease hydralazine to 25mg BID  - resumed torsemide 20mg PO BID on 7/7  - strict I/Os, daily weights

## 2023-07-09 NOTE — PROGRESS NOTE ADULT - PROBLEM SELECTOR PLAN 5
Cr of 2.52, discharged on 1.98, CKD IV, baseline creatinine 1.6-1.9 range  - Cr improved. mildly increased but at baseline  - renal US negative for hydro; renal parenchymal disease  - UTI treated as above as above  - Nephrology consulted, recs appreciated  - resumed torsemide 20mg PO BID which she has been tolerating  - renally dose meds to GFR, avoid nephrotoxic agents

## 2023-07-09 NOTE — PROGRESS NOTE ADULT - PROBLEM SELECTOR PLAN 6
BP marginal with bradycardia to HR 50s on tele  - decrease carvedilol to 6.25mg q12h  - decrease hydralazine to 25mg BID  -  - stop amlodipine  - monitor vitals BP marginal with bradycardia to HR 50s on tele  - decrease carvedilol to 6.25mg q12h  - decrease hydralazine to 25mg BID  - stop amlodipine, last dose was 7/8  - monitor vitals/HR on tele

## 2023-07-10 VITALS
RESPIRATION RATE: 18 BRPM | HEART RATE: 63 BPM | DIASTOLIC BLOOD PRESSURE: 55 MMHG | OXYGEN SATURATION: 96 % | SYSTOLIC BLOOD PRESSURE: 115 MMHG | TEMPERATURE: 98 F

## 2023-07-10 LAB
ANION GAP SERPL CALC-SCNC: 12 MMOL/L — SIGNIFICANT CHANGE UP (ref 5–17)
BUN SERPL-MCNC: 45 MG/DL — HIGH (ref 7–23)
CALCIUM SERPL-MCNC: 9.2 MG/DL — SIGNIFICANT CHANGE UP (ref 8.4–10.5)
CHLORIDE SERPL-SCNC: 102 MMOL/L — SIGNIFICANT CHANGE UP (ref 96–108)
CO2 SERPL-SCNC: 26 MMOL/L — SIGNIFICANT CHANGE UP (ref 22–31)
CREAT SERPL-MCNC: 2.1 MG/DL — HIGH (ref 0.5–1.3)
EGFR: 21 ML/MIN/1.73M2 — LOW
GLUCOSE SERPL-MCNC: 95 MG/DL — SIGNIFICANT CHANGE UP (ref 70–99)
HCT VFR BLD CALC: 32.4 % — LOW (ref 34.5–45)
HGB BLD-MCNC: 9.9 G/DL — LOW (ref 11.5–15.5)
MCHC RBC-ENTMCNC: 29.6 PG — SIGNIFICANT CHANGE UP (ref 27–34)
MCHC RBC-ENTMCNC: 30.6 GM/DL — LOW (ref 32–36)
MCV RBC AUTO: 96.7 FL — SIGNIFICANT CHANGE UP (ref 80–100)
NRBC # BLD: 0 /100 WBCS — SIGNIFICANT CHANGE UP (ref 0–0)
PLATELET # BLD AUTO: 166 K/UL — SIGNIFICANT CHANGE UP (ref 150–400)
POTASSIUM SERPL-MCNC: 4.5 MMOL/L — SIGNIFICANT CHANGE UP (ref 3.5–5.3)
POTASSIUM SERPL-SCNC: 4.5 MMOL/L — SIGNIFICANT CHANGE UP (ref 3.5–5.3)
RBC # BLD: 3.35 M/UL — LOW (ref 3.8–5.2)
RBC # FLD: 15.4 % — HIGH (ref 10.3–14.5)
SODIUM SERPL-SCNC: 140 MMOL/L — SIGNIFICANT CHANGE UP (ref 135–145)
WBC # BLD: 6.52 K/UL — SIGNIFICANT CHANGE UP (ref 3.8–10.5)
WBC # FLD AUTO: 6.52 K/UL — SIGNIFICANT CHANGE UP (ref 3.8–10.5)

## 2023-07-10 PROCEDURE — 93005 ELECTROCARDIOGRAM TRACING: CPT

## 2023-07-10 PROCEDURE — 84484 ASSAY OF TROPONIN QUANT: CPT

## 2023-07-10 PROCEDURE — 86901 BLOOD TYPING SEROLOGIC RH(D): CPT

## 2023-07-10 PROCEDURE — 85027 COMPLETE CBC AUTOMATED: CPT

## 2023-07-10 PROCEDURE — 97162 PT EVAL MOD COMPLEX 30 MIN: CPT

## 2023-07-10 PROCEDURE — 96374 THER/PROPH/DIAG INJ IV PUSH: CPT

## 2023-07-10 PROCEDURE — 83550 IRON BINDING TEST: CPT

## 2023-07-10 PROCEDURE — 84443 ASSAY THYROID STIM HORMONE: CPT

## 2023-07-10 PROCEDURE — 99285 EMERGENCY DEPT VISIT HI MDM: CPT

## 2023-07-10 PROCEDURE — 85025 COMPLETE CBC W/AUTO DIFF WBC: CPT

## 2023-07-10 PROCEDURE — 71045 X-RAY EXAM CHEST 1 VIEW: CPT

## 2023-07-10 PROCEDURE — 82728 ASSAY OF FERRITIN: CPT

## 2023-07-10 PROCEDURE — 80048 BASIC METABOLIC PNL TOTAL CA: CPT

## 2023-07-10 PROCEDURE — 87040 BLOOD CULTURE FOR BACTERIA: CPT

## 2023-07-10 PROCEDURE — A9540: CPT

## 2023-07-10 PROCEDURE — A9567: CPT

## 2023-07-10 PROCEDURE — 81001 URINALYSIS AUTO W/SCOPE: CPT

## 2023-07-10 PROCEDURE — 97116 GAIT TRAINING THERAPY: CPT

## 2023-07-10 PROCEDURE — 80053 COMPREHEN METABOLIC PANEL: CPT

## 2023-07-10 PROCEDURE — 87086 URINE CULTURE/COLONY COUNT: CPT

## 2023-07-10 PROCEDURE — 82803 BLOOD GASES ANY COMBINATION: CPT

## 2023-07-10 PROCEDURE — 97530 THERAPEUTIC ACTIVITIES: CPT

## 2023-07-10 PROCEDURE — 85610 PROTHROMBIN TIME: CPT

## 2023-07-10 PROCEDURE — 86900 BLOOD TYPING SEROLOGIC ABO: CPT

## 2023-07-10 PROCEDURE — 86850 RBC ANTIBODY SCREEN: CPT

## 2023-07-10 PROCEDURE — 83540 ASSAY OF IRON: CPT

## 2023-07-10 PROCEDURE — 85045 AUTOMATED RETICULOCYTE COUNT: CPT

## 2023-07-10 PROCEDURE — 84100 ASSAY OF PHOSPHORUS: CPT

## 2023-07-10 PROCEDURE — 78582 LUNG VENTILAT&PERFUS IMAGING: CPT

## 2023-07-10 PROCEDURE — 93306 TTE W/DOPPLER COMPLETE: CPT

## 2023-07-10 PROCEDURE — 83735 ASSAY OF MAGNESIUM: CPT

## 2023-07-10 PROCEDURE — 93970 EXTREMITY STUDY: CPT

## 2023-07-10 PROCEDURE — 99239 HOSP IP/OBS DSCHRG MGMT >30: CPT

## 2023-07-10 PROCEDURE — 36415 COLL VENOUS BLD VENIPUNCTURE: CPT

## 2023-07-10 PROCEDURE — 85730 THROMBOPLASTIN TIME PARTIAL: CPT

## 2023-07-10 PROCEDURE — 87186 SC STD MICRODIL/AGAR DIL: CPT

## 2023-07-10 PROCEDURE — 83880 ASSAY OF NATRIURETIC PEPTIDE: CPT

## 2023-07-10 PROCEDURE — 87077 CULTURE AEROBIC IDENTIFY: CPT

## 2023-07-10 PROCEDURE — 84439 ASSAY OF FREE THYROXINE: CPT

## 2023-07-10 PROCEDURE — 76770 US EXAM ABDO BACK WALL COMP: CPT

## 2023-07-10 PROCEDURE — 0225U NFCT DS DNA&RNA 21 SARSCOV2: CPT

## 2023-07-10 RX ORDER — CARVEDILOL PHOSPHATE 80 MG/1
1 CAPSULE, EXTENDED RELEASE ORAL
Qty: 60 | Refills: 0
Start: 2023-07-10 | End: 2023-08-08

## 2023-07-10 RX ORDER — AMLODIPINE BESYLATE 2.5 MG/1
1 TABLET ORAL
Refills: 0 | DISCHARGE

## 2023-07-10 RX ORDER — HYDRALAZINE HCL 50 MG
1 TABLET ORAL
Qty: 90 | Refills: 0
Start: 2023-07-10 | End: 2023-08-08

## 2023-07-10 RX ORDER — CARVEDILOL PHOSPHATE 80 MG/1
1 CAPSULE, EXTENDED RELEASE ORAL
Refills: 0 | DISCHARGE

## 2023-07-10 RX ORDER — APIXABAN 2.5 MG/1
1 TABLET, FILM COATED ORAL
Qty: 60 | Refills: 0
Start: 2023-07-10 | End: 2023-08-08

## 2023-07-10 RX ORDER — HYDRALAZINE HCL 50 MG
1 TABLET ORAL
Refills: 0 | DISCHARGE

## 2023-07-10 RX ADMIN — BRIMONIDINE TARTRATE 1 DROP(S): 2 SOLUTION/ DROPS OPHTHALMIC at 05:25

## 2023-07-10 RX ADMIN — Medication 20 MILLIGRAM(S): at 05:25

## 2023-07-10 RX ADMIN — PANTOPRAZOLE SODIUM 40 MILLIGRAM(S): 20 TABLET, DELAYED RELEASE ORAL at 05:25

## 2023-07-10 RX ADMIN — Medication 325 MILLIGRAM(S): at 12:31

## 2023-07-10 RX ADMIN — Medication 25 MILLIGRAM(S): at 05:27

## 2023-07-10 RX ADMIN — Medication 1 DROP(S): at 05:24

## 2023-07-10 RX ADMIN — CARVEDILOL PHOSPHATE 6.25 MILLIGRAM(S): 80 CAPSULE, EXTENDED RELEASE ORAL at 10:23

## 2023-07-10 RX ADMIN — POLYETHYLENE GLYCOL 3350 17 GRAM(S): 17 POWDER, FOR SOLUTION ORAL at 12:31

## 2023-07-10 RX ADMIN — Medication 1 TABLET(S): at 12:32

## 2023-07-10 RX ADMIN — APIXABAN 5 MILLIGRAM(S): 2.5 TABLET, FILM COATED ORAL at 05:25

## 2023-07-10 NOTE — PROGRESS NOTE ADULT - SUBJECTIVE AND OBJECTIVE BOX
[facility] Division of Hospital Medicine  Olivia Daniels MD  Pager (MINDI-DANA, 0H-3T): [phone]  Other Times:  [phone]    Patient is a 89y old  Female who presents with a chief complaint of dyspnea (09 Jul 2023 17:32)      SUBJECTIVE / OVERNIGHT EVENTS:  feeling well. denies any complaints. no     ADDITIONAL REVIEW OF SYSTEMS:    MEDICATIONS  (STANDING):  apixaban 5 milliGRAM(s) Oral every 12 hours  atorvastatin 40 milliGRAM(s) Oral at bedtime  brimonidine 0.2% Ophthalmic Solution 1 Drop(s) Both EYES two times a day  calcium carbonate 1250 mG  + Vitamin D (OsCal 500 + D) 1 Tablet(s) Oral daily  carvedilol 6.25 milliGRAM(s) Oral every 12 hours  ferrous    sulfate 325 milliGRAM(s) Oral daily  hydrALAZINE 25 milliGRAM(s) Oral two times a day  pantoprazole    Tablet 40 milliGRAM(s) Oral before breakfast  polyethylene glycol 3350 17 Gram(s) Oral daily  senna 2 Tablet(s) Oral at bedtime  timolol 0.5% Solution 1 Drop(s) Both EYES two times a day  torsemide 20 milliGRAM(s) Oral every 12 hours    MEDICATIONS  (PRN):  acetaminophen     Tablet .. 650 milliGRAM(s) Oral every 6 hours PRN Temp greater or equal to 38C (100.4F), Mild Pain (1 - 3)  aluminum hydroxide/magnesium hydroxide/simethicone Suspension 30 milliLiter(s) Oral every 4 hours PRN Dyspepsia  bisacodyl Suppository 10 milliGRAM(s) Rectal daily PRN Constipation  melatonin 3 milliGRAM(s) Oral at bedtime PRN Insomnia  traMADol 25 milliGRAM(s) Oral every 6 hours PRN Moderate Pain (4 - 6)  traMADol 50 milliGRAM(s) Oral every 6 hours PRN Severe Pain (7 - 10)      CAPILLARY BLOOD GLUCOSE        I&O's Summary    09 Jul 2023 07:01  -  10 Jul 2023 07:00  --------------------------------------------------------  IN: 365 mL / OUT: 1250 mL / NET: -885 mL        PHYSICAL EXAM:  Vital Signs Last 24 Hrs  T(C): 36.4 (10 Jul 2023 11:54), Max: 36.8 (09 Jul 2023 20:23)  T(F): 97.6 (10 Jul 2023 11:54), Max: 98.3 (09 Jul 2023 20:23)  HR: 63 (10 Jul 2023 11:54) (54 - 65)  BP: 115/55 (10 Jul 2023 11:54) (103/53 - 135/70)  BP(mean): 87 (09 Jul 2023 20:23) (87 - 87)  RR: 18 (10 Jul 2023 11:54) (18 - 18)  SpO2: 96% (10 Jul 2023 11:54) (95% - 97%)    Parameters below as of 10 Jul 2023 11:54  Patient On (Oxygen Delivery Method): nasal cannula        CONSTITUTIONAL: NAD, well-groomed  EYES:  conjunctiva and sclera clear  ENMT: Moist oral mucosa  NECK: Supple, no palpable masses; no JVD  RESPIRATORY: Normal respiratory effort; lungs are clear to auscultation bilaterally  CARDIOVASCULAR: Regular rate and rhythm, normal S1 and S2, no murmur/rub/gallop; No lower extremity edema  ABDOMEN: Nontender to palpation, normoactive bowel sounds, no rebound/guarding  MUSCULOSKELETAL:  no clubbing or cyanosis of digits; no joint swelling or tenderness to palpation  PSYCH: A+O to person, place, and time; affect appropriate  SKIN: No rashes; no palpable lesions    LABS:                        9.9    6.52  )-----------( 166      ( 10 Jul 2023 07:15 )             32.4     07-10    140  |  102  |  45<H>  ----------------------------<  95  4.5   |  26  |  2.10<H>    Ca    9.2      10 Jul 2023 07:10  Phos  2.7     07-09  Mg     2.4     07-09            Urinalysis Basic - ( 10 Jul 2023 07:10 )    Color: x / Appearance: x / SG: x / pH: x  Gluc: 95 mg/dL / Ketone: x  / Bili: x / Urobili: x   Blood: x / Protein: x / Nitrite: x   Leuk Esterase: x / RBC: x / WBC x   Sq Epi: x / Non Sq Epi: x / Bacteria: x          RADIOLOGY & ADDITIONAL TESTS:  Results Reviewed:   Imaging Personally Reviewed:  Electrocardiogram Personally Reviewed:    COORDINATION OF CARE:  Care Discussed with Consultants/Other Providers [Y/N]:  Prior or Outpatient Records Reviewed [Y/N]:   [facility] Division of Hospital Medicine  Olivia Daniels MD  Pager (MINDI-F, 8A-5P): [phone]  Other Times:  [phone]    Patient is a 89y old  Female who presents with a chief complaint of dyspnea (09 Jul 2023 17:32)      SUBJECTIVE / OVERNIGHT EVENTS:  feeling well. denies any complaints. 7 beats of WCT early in am 7/9.   no events overnight     ADDITIONAL REVIEW OF SYSTEMS: otherwise neg    MEDICATIONS  (STANDING):  apixaban 5 milliGRAM(s) Oral every 12 hours  atorvastatin 40 milliGRAM(s) Oral at bedtime  brimonidine 0.2% Ophthalmic Solution 1 Drop(s) Both EYES two times a day  calcium carbonate 1250 mG  + Vitamin D (OsCal 500 + D) 1 Tablet(s) Oral daily  carvedilol 6.25 milliGRAM(s) Oral every 12 hours  ferrous    sulfate 325 milliGRAM(s) Oral daily  hydrALAZINE 25 milliGRAM(s) Oral two times a day  pantoprazole    Tablet 40 milliGRAM(s) Oral before breakfast  polyethylene glycol 3350 17 Gram(s) Oral daily  senna 2 Tablet(s) Oral at bedtime  timolol 0.5% Solution 1 Drop(s) Both EYES two times a day  torsemide 20 milliGRAM(s) Oral every 12 hours    MEDICATIONS  (PRN):  acetaminophen     Tablet .. 650 milliGRAM(s) Oral every 6 hours PRN Temp greater or equal to 38C (100.4F), Mild Pain (1 - 3)  aluminum hydroxide/magnesium hydroxide/simethicone Suspension 30 milliLiter(s) Oral every 4 hours PRN Dyspepsia  bisacodyl Suppository 10 milliGRAM(s) Rectal daily PRN Constipation  melatonin 3 milliGRAM(s) Oral at bedtime PRN Insomnia  traMADol 25 milliGRAM(s) Oral every 6 hours PRN Moderate Pain (4 - 6)  traMADol 50 milliGRAM(s) Oral every 6 hours PRN Severe Pain (7 - 10)      CAPILLARY BLOOD GLUCOSE        I&O's Summary    09 Jul 2023 07:01  -  10 Jul 2023 07:00  --------------------------------------------------------  IN: 365 mL / OUT: 1250 mL / NET: -885 mL        PHYSICAL EXAM:  Vital Signs Last 24 Hrs  T(C): 36.4 (10 Jul 2023 11:54), Max: 36.8 (09 Jul 2023 20:23)  T(F): 97.6 (10 Jul 2023 11:54), Max: 98.3 (09 Jul 2023 20:23)  HR: 63 (10 Jul 2023 11:54) (54 - 65)  BP: 115/55 (10 Jul 2023 11:54) (103/53 - 135/70)  BP(mean): 87 (09 Jul 2023 20:23) (87 - 87)  RR: 18 (10 Jul 2023 11:54) (18 - 18)  SpO2: 96% (10 Jul 2023 11:54) (95% - 97%)    Parameters below as of 10 Jul 2023 11:54  Patient On (Oxygen Delivery Method): nasal cannula      CONSTITUTIONAL: NAD, well-groomed  EYES:  conjunctiva and sclera clear  ENMT: Moist oral mucosa  NECK: Supple, no palpable masses; no JVD  RESPIRATORY: Normal respiratory effort; lungs are clear to auscultation bilaterally  CARDIOVASCULAR: systolic murmur; No lower extremity edema  ABDOMEN: Nontender to palpation, normoactive bowel sounds, no rebound/guarding  MUSCULOSKELETAL:  no clubbing or cyanosis of digits; no joint swelling or tenderness to palpation  PSYCH: A+O to person, place, and time; affect appropriate  SKIN: No rashes; no palpable lesions    LABS:                        9.9    6.52  )-----------( 166      ( 10 Jul 2023 07:15 )             32.4     07-10    140  |  102  |  45<H>  ----------------------------<  95  4.5   |  26  |  2.10<H>    Ca    9.2      10 Jul 2023 07:10  Phos  2.7     07-09  Mg     2.4     07-09            Urinalysis Basic - ( 10 Jul 2023 07:10 )    Color: x / Appearance: x / SG: x / pH: x  Gluc: 95 mg/dL / Ketone: x  / Bili: x / Urobili: x   Blood: x / Protein: x / Nitrite: x   Leuk Esterase: x / RBC: x / WBC x   Sq Epi: x / Non Sq Epi: x / Bacteria: x          RADIOLOGY & ADDITIONAL TESTS:  Results Reviewed:   Imaging Personally Reviewed:  Electrocardiogram Personally Reviewed:    COORDINATION OF CARE:  Care Discussed with Consultants/Other Providers [Y/N]:  Prior or Outpatient Records Reviewed [Y/N]:

## 2023-07-10 NOTE — PROGRESS NOTE ADULT - PROBLEM SELECTOR PLAN 7
S/P an open reduction and internal fixation of the left hip from April 14, 2023  - c/w Tramadol PRN for moderate/severe pain (taking it at home of discharge)  - was on Eliquis 2.5 mg BID for 6 weeks for post-operative for DVT ppx though still developed DVT/PE

## 2023-07-10 NOTE — PROGRESS NOTE ADULT - SUBJECTIVE AND OBJECTIVE BOX
[facility]   [phone]  RENAL FOLLOW UP NOTE  --------------------------------------------------------------------------------  Chief Complaint:    24 hour events/subjective:    patient seen and examined.   denies sob     PAST HISTORY  --------------------------------------------------------------------------------  No significant changes to PMH, PSH, FHx, SHx, unless otherwise noted    ALLERGIES & MEDICATIONS  --------------------------------------------------------------------------------  Allergies    No Known Allergies    Intolerances      Standing Inpatient Medications  apixaban 5 milliGRAM(s) Oral every 12 hours  atorvastatin 40 milliGRAM(s) Oral at bedtime  brimonidine 0.2% Ophthalmic Solution 1 Drop(s) Both EYES two times a day  calcium carbonate 1250 mG  + Vitamin D (OsCal 500 + D) 1 Tablet(s) Oral daily  carvedilol 6.25 milliGRAM(s) Oral every 12 hours  ferrous    sulfate 325 milliGRAM(s) Oral daily  hydrALAZINE 25 milliGRAM(s) Oral two times a day  pantoprazole    Tablet 40 milliGRAM(s) Oral before breakfast  polyethylene glycol 3350 17 Gram(s) Oral daily  senna 2 Tablet(s) Oral at bedtime  timolol 0.5% Solution 1 Drop(s) Both EYES two times a day  torsemide 20 milliGRAM(s) Oral every 12 hours    PRN Inpatient Medications  acetaminophen     Tablet .. 650 milliGRAM(s) Oral every 6 hours PRN  aluminum hydroxide/magnesium hydroxide/simethicone Suspension 30 milliLiter(s) Oral every 4 hours PRN  bisacodyl Suppository 10 milliGRAM(s) Rectal daily PRN  melatonin 3 milliGRAM(s) Oral at bedtime PRN  traMADol 50 milliGRAM(s) Oral every 6 hours PRN  traMADol 25 milliGRAM(s) Oral every 6 hours PRN      REVIEW OF SYSTEMS  --------------------------------------------------------------------------------    Gen: denies fevers/chills,  CVS: denies chest pain/palpitations  Resp: denies SOB/Cough  GI: Denies N/V/Abd pain  : Denies dysuria    VITALS/PHYSICAL EXAM  --------------------------------------------------------------------------------  T(C): 36.4 (07-10-23 @ 11:54), Max: 36.8 (07-09-23 @ 20:23)  HR: 63 (07-10-23 @ 11:54) (54 - 65)  BP: 115/55 (07-10-23 @ 11:54) (115/55 - 135/70)  RR: 18 (07-10-23 @ 11:54) (18 - 18)  SpO2: 96% (07-10-23 @ 11:54) (95% - 97%)  Wt(kg): --        07-09-23 @ 07:01  -  07-10-23 @ 07:00  --------------------------------------------------------  IN: 365 mL / OUT: 1250 mL / NET: -885 mL    07-10-23 @ 07:01  -  07-10-23 @ 16:43  --------------------------------------------------------  IN: 236 mL / OUT: 300 mL / NET: -64 mL      Physical Exam:  	  	Gen: Non toxic comfortable appearing   	Pulm: decrease bs  no rales or ronchi or wheezing  	CV: +JVD. RRR, S1S2; no rub  	Abd: +BS, soft, nontender/nondistended  	: No suprapubic tenderness  	UE: Warm, no cyanosis  no clubbing,  no edema;  	LE: Warm, no cyanosis  no clubbing, 1+ edema    LABS/STUDIES  --------------------------------------------------------------------------------              9.9    6.52  >-----------<  166      [07-10-23 @ 07:15]              32.4     140  |  102  |  45  ----------------------------<  95      [07-10-23 @ 07:10]  4.5   |  26  |  2.10        Ca     9.2     [07-10-23 @ 07:10]      Mg     2.4     [07-09-23 @ 05:25]      Phos  2.7     [07-09-23 @ 05:25]            Creatinine Trend:  SCr 2.10 [07-10 @ 07:10]  SCr 1.90 [07-09 @ 05:25]  SCr 1.85 [07-08 @ 06:09]  SCr 1.42 [07-07 @ 06:48]  SCr 1.72 [07-06 @ 05:28]              Urinalysis - [07-10-23 @ 07:10]      Color  / Appearance  / SG  / pH       Gluc 95 / Ketone   / Bili  / Urobili        Blood  / Protein  / Leuk Est  / Nitrite       RBC  / WBC  / Hyaline  / Gran  / Sq Epi  / Non Sq Epi  / Bacteria       Iron 36, TIBC 214, %sat 17      [07-06-23 @ 05:29]  Ferritin 232      [07-06-23 @ 05:29]  PTH -- (Ca 9.1)      [04-18-23 @ 07:37]   128  TSH 5.03      [07-02-23 @ 22:53]

## 2023-07-10 NOTE — PROGRESS NOTE ADULT - PROBLEM SELECTOR PLAN 3
- appears more euvolemic on exam now  - BNP downtrending  - TTE with EF 61% and RV pressure and overload, mod-severe AR, mod TR  - s/p IV lasix subsequently held due to worsening Cr (now improved)  - decrease carvedilol to 6.25mg BID, decrease hydralazine to 25mg BID  - resumed torsemide 20mg PO BID on 7/7  - strict I/Os, daily weights

## 2023-07-10 NOTE — PROGRESS NOTE ADULT - PROBLEM SELECTOR PLAN 1
presents with dyspnea on exertion 2/2 CHF exacerbation and PE   - pro-BNP elevated to 59K on admission, improving  - trops 109-->116-->88, EKG with 1st deg AV block, QTc prolongation but no ST-T changes  - CXR with no pleural effusion/consolidation  - s/p IV lasix now, held for KAYLA, with now PO diuretics resumed  - duplex of LE revealed acute bilateral above knee DVT. suspect provoked in setting of recent surgery/immobilization  - held off CTA chest to evaluate for PE given KAYLA on CKD and as it would not  at this time  - TTE with EF 61% and RV pressure and overload, mod-severe AR, mod TR  - however, new desat on RA requiring supplemental O2, SpO2 87% on RA with ambulation - will need home O2 set-up prior to d/c  - VQ scan confirmed high probability PE presents with dyspnea on exertion 2/2 CHF exacerbation and PE   - pro-BNP elevated to 59K on admission, improving  - CXR with no pleural effusion/consolidation  - s/p IV lasix now, held for KAYLA, with now PO diuretics resumed  - duplex of LE revealed acute bilateral above knee DVT. suspect provoked in setting of recent surgery/ immobilization  - held off CTA chest to evaluate for PE given KAYLA on CKD and as it would not  at this time  - TTE with EF 61% and RV pressure and overload, mod-severe AR, mod TR  - however, new desat on RA requiring supplemental O2, SpO2 87% on RA with ambulation - will need home O2 set-up prior to d/c  - VQ scan confirmed high probability PE

## 2023-07-10 NOTE — PROGRESS NOTE ADULT - PROBLEM SELECTOR PROBLEM 2
Acute deep vein thrombosis (DVT)

## 2023-07-10 NOTE — PROGRESS NOTE ADULT - PROBLEM SELECTOR PROBLEM 3
Acute on chronic diastolic congestive heart failure
Acute UTI
Acute on chronic diastolic congestive heart failure

## 2023-07-10 NOTE — PROGRESS NOTE ADULT - PROVIDER SPECIALTY LIST ADULT
Nephrology
Hospitalist
Nephrology
Nephrology
Hospitalist
Hospitalist
Nephrology
Nephrology
Hospitalist
Internal Medicine

## 2023-07-10 NOTE — PROGRESS NOTE ADULT - ASSESSMENT
89 year old Female with PMhx of HTN, HLD, CKD IV, anemia, recent admit in April 2023 left hip fracture status post repair (On Eliquis for DVT PPx) and subsequent rehab admission, presents with shortness of breath and generalized weakness x2 days states that shortness of breath is worse on exertion. Also found to have acute B/L DVT      1- KAYLA on CKDIV  2- UTI  3- CHF  4- anemia  5- HTN      KAYLA in setting of CHF exacerbation vs UTI infection.   baseline creatinine 1.9, GFR ~23  creatinine higher today with restarting torsemide.   cotninue torsemide 20 mg BID  ceftriaxone 1G daily for UTI, completed  decrease amlodipine to 5 mg daily  hydralazine 100 mg TID, has not been given due to BP parameters  carvedilol 25 mg BID  trend bp  anemia, trend hgb  continue ferrous sulfate 325 mg daily  strict I/O  trend creatinine and electrolytes daily
89 year old Female with PMhx of HTN, HLD, CKD IV, anemia, recent admit in April 2023 left hip fracture status post repair (On Eliquis for DVT PPx) and subsequent rehab admission, presents with shortness of breath and generalized weakness x2 days states that shortness of breath is worse on exertion. Also found to have acute B/L DVT      1- KAYLA on CKDIV  2- UTI  3- CHF  4- anemia  5- HTN      KAYLA in setting of CHF exacerbation vs UTI infection.   baseline creatinine 1.9, GFR ~23  creatinine returning to baseline with resuming diuretics  cotninue torsemide 20 mg BID  ceftriaxone 1G daily for UTI, completed  hydralazine 25 mg TID,   carvedilol 6.25 mg BID  trend bp  anemia, trend hgb  continue ferrous sulfate 325 mg daily  strict I/O  trend creatinine and electrolytes daily
89 yo F with PMH of HTN, HLD, CKD IV recent admit in April 2023 left hip fracture status post repair (On Eliquis for DVT PPx) and subsequent rehab admission who presents with shortness of breath likely 2/2 CHF exac in setting of acute b/l DVT in setting of recent surgery/immobilization now transitioned to PO eliquis.
89 year old Female with PMhx of HTN, HLD, CKD IV, anemia, recent admit in April 2023 left hip fracture status post repair (On Eliquis for DVT PPx) and subsequent rehab admission, presents with shortness of breath and generalized weakness x2 days states that shortness of breath is worse on exertion. Also found to have acute B/L DVT      1- KAYLA on CKDIV  2- UTI  3- CHF  4- anemia  5- HTN      KAYLA in setting of CHF exacerbation vs UTI infection.   baseline creatinine 1.9, GFR ~23  creatinine is improving  resume torsemide 20 mg BID  ceftriaxone 1G daily for UTI, completed  amlodipine 10 mg daily  hydralazine 100 mg TID  carvedilol 25 mg BID  trend bp  anemia, trend hgb  continue ferrous sulfate 325 mg daily  strict I/O  trend creatinine and electrolytes daily
89 yo F with PMH of HTN, HLD, CKD IV recent admit in April 2023 left hip fracture status post repair (On Eliquis for DVT PPx) and subsequent rehab admission who presents with shortness of breath likely 2/2 CHF exac and acute PE and b/l DVT in setting of recent surgery/immobilization now transitioned to PO eliquis.
89 yo F with PMH of HTN, HLD, CKD IV recent admit in April 2023 left hip fracture status post repair (On Eliquis for DVT PPx) and subsequent rehab admission who presents with shortness of breath likely 2/2 CHF exac in setting of acute b/l DVT in setting of recent surgery/immobilization on hep gtt with plan to transition to eliquis.
89 year old Female with PMhx of HTN, HLD, CKD IV, anemia, recent admit in April 2023 left hip fracture status post repair (On Eliquis for DVT PPx) and subsequent rehab admission, presents with shortness of breath and generalized weakness x2 days states that shortness of breath is worse on exertion. Also found to have acute B/L DVT      1- KAYLA on CKDIV  2- UTI  3- CHF  4- anemia  5- HTN      KAYLA in setting of CHF exacerbation vs UTI infection.   baseline creatinine 1.9, GFR ~23  creatinine is improving  appears near euvolemic on exam  echo reviewed, plan for resume torsemide in am  ceftriaxone 1G daily for UTI  amlodipine 10 mg daily  hydralazine 100 mg TID  carvedilol 25 mg BID  trend bp  anemia, trend hgb  continue ferrous sulfate 325 mg daily  strict I/O  trend creatinine and electrolytes daily
89 year old Female with PMhx of HTN, HLD, CKD IV, anemia, recent admit in April 2023 left hip fracture status post repair (On Eliquis for DVT PPx) and subsequent rehab admission, presents with shortness of breath and generalized weakness x2 days states that shortness of breath is worse on exertion. Also found to have acute B/L DVT      1- KAYLA on CKDIV  2- UTI  3- CHF  4- anemia  5- HTN      KAYLA in setting of CHF exacerbation vs UTI infection.   baseline creatinine 1.9, GFR ~23  creatinine is improving near baseline.   appears near euvolemic on exam  can hold diuretics today, if becomes sob recommend lasix 40 ivp  ceftriaxone 1G daily  amlodipine 10 mg daily  hydralazine 100 mg TID  carvedilol 25 mg BID  trend bp  anemia, trend hgb  check retic count and iron studies  continue ferrous sulfate 325 mg daily  strict I/O  trend creatinine and electrolytes daily  
89 yo F with PMH of HTN, HLD, CKD IV recent admit in April 2023 left hip fracture status post repair (On Eliquis for DVT PPx) and subsequent rehab admission who presents with shortness of breath likely 2/2 CHF exac in setting of acute b/l DVT in setting of recent surgery/immobilization on hep gtt.
89F with PMhx of HTN, HLD, CKD IV recent admit in April 2023 left hip fracture status post repair (On Eliquis for DVT PPx) and subsequent rehab admission, presents with shortness of breath, likely due to CHF exacerbation, r/o PE/DVT in setting of recent surgery/immobilization. 
89 yo F with PMH of HTN, HLD, CKD IV recent admit in April 2023 left hip fracture status post repair (On Eliquis for DVT PPx) and subsequent rehab admission who presents with shortness of breath likely 2/2 CHF exac in setting of acute b/l DVT in setting of recent surgery/immobilization now transitioned to PO eliquis.
89 yo F with PMH of HTN, HLD, CKD IV recent admit in April 2023 left hip fracture status post repair (On Eliquis for DVT PPx) and subsequent rehab admission who presents with shortness of breath likely 2/2 CHF exac and acute PE and b/l DVT in setting of recent surgery/immobilization now transitioned to PO eliquis.

## 2023-07-10 NOTE — PROGRESS NOTE ADULT - PROBLEM SELECTOR PLAN 5
Cr of 2.52, discharged on 1.98, CKD IV, baseline creatinine 1.6-1.9 range  - Cr improved. mildly increased but at baseline  - renal US negative for hydro; renal parenchymal disease  - UTI treated as above as above  - Nephrology consulted, recs appreciated  - resumed torsemide 20mg PO BID which she has been tolerating  - renally dose meds to GFR, avoid nephrotoxic agents UA positive   - urine cx growing Klebsiella oxytoca, sensitivities noted  - completed ceftriaxone x 3 day course on 7/5  - blood cx negative

## 2023-07-10 NOTE — PROGRESS NOTE ADULT - NS ATTEND AMEND GEN_ALL_CORE FT
Seen, examined with, formulated plan with and  agree with above as scribed by NP Chantal [Elver]     lungs decrease bs no rales  heart RRR  ext no edema     CKD IV  CHF chronic     cr is still in her baseline range   torsemide 20 mg bid

## 2023-07-10 NOTE — PROGRESS NOTE ADULT - PROBLEM SELECTOR PLAN 8
DVT ppx: eliquis for DVT    Dispo: PT recs [facility] but patient and son want home with home PT  CM working on home O2 set-up, likely to be delivered late tonight vs. early tomorrow DVT ppx: eliquis for DVT    Dispo: PT recs [facility] but patient and son want home with home PT

## 2023-07-10 NOTE — PROGRESS NOTE ADULT - PROBLEM SELECTOR PROBLEM 7
Fracture of left hip requiring operative repair

## 2023-07-10 NOTE — PROGRESS NOTE ADULT - PROBLEM SELECTOR PLAN 6
BP marginal with bradycardia to HR 50s on tele  - decrease carvedilol to 6.25mg q12h  - decrease hydralazine to 25mg BID  - stop amlodipine, last dose was 7/8  - monitor vitals/HR on tele

## 2023-07-10 NOTE — PROGRESS NOTE ADULT - PROBLEM SELECTOR PLAN 4
UA positive   - urine cx growing Klebsiella oxytoca, sensitivities noted  - completed ceftriaxone x 3 day course on 7/5  - blood cx negative Cr of 2.52on admit, discharged on 1.98, CKD IV, baseline creatinine 1.6-1.9 range  - Cr improved. mildly increased but at baseline  - renal US negative for hydro; renal parenchymal disease  - UTI treated as above as above  - Nephrology consulted, recs appreciated  - resumed torsemide 20mg PO BID which she has been tolerating  - renally dose meds to GFR, avoid nephrotoxic agents

## 2023-07-20 ENCOUNTER — APPOINTMENT (OUTPATIENT)
Dept: ORTHOPEDIC SURGERY | Facility: CLINIC | Age: 88
End: 2023-07-20
Payer: MEDICARE

## 2023-07-20 DIAGNOSIS — S72.002A FRACTURE OF UNSPECIFIED PART OF NECK OF LEFT FEMUR, INITIAL ENCOUNTER FOR CLOSED FRACTURE: ICD-10-CM

## 2023-07-20 PROCEDURE — 73502 X-RAY EXAM HIP UNI 2-3 VIEWS: CPT

## 2023-07-20 PROCEDURE — 99213 OFFICE O/P EST LOW 20 MIN: CPT

## 2023-07-20 NOTE — DISCUSSION/SUMMARY
[de-identified] : 89-year-old woman s/p left hip hemiarthroplasty, approximately 3 months out. \par \par -Weightbearing as tolerated\par -Physical therapy: strengthening and gait training \par -Follow up in 4 months with x-rays at that time.\par -All the patient's questions and concerns were addressed during this visit

## 2023-07-20 NOTE — HISTORY OF PRESENT ILLNESS
[de-identified] : Ms. FRANSISCO LANDERS is a 89 year old woman presents today for follow up s/p left hip hemiarthroplasty on 4/14/23. Since her last visit she states she is feeling better. She is ambulating with a walker at home.

## 2023-07-20 NOTE — PHYSICAL EXAM
[de-identified] : The patient is sitting comfortably in the exam room. \par LEFT hip\par -Skin is intact, no swelling, no ecchymosis\par -incisions well-healed, no erythema\par -No tenderness to palpation over the greater trochanter\par -Painless range of motion hip\par -Flexion: , Internal rotation: , External rotation:\par -Sensation is intact L1-S1\par -5/5 EHL, FHL, TA, GS, quadriceps, hamstrings\par -Foot is warm and well-perfused, palpable dorsalis pedis pulse  [de-identified] : Xrays of the left hip and AP pelvis were taken in the office today, 7/20/23

## 2023-08-15 NOTE — HISTORY OF PRESENT ILLNESS
[Chills] : no chills [Constipation] : no constipation [Diarrhea] : no diarrhea [Dysuria] : no dysuria [Fever] : no fever [Nausea] : no nausea [Vomiting] : no vomiting [de-identified] : s/p hemiarthroplasty of the left hip, DOS: 4/14/23 [de-identified] : FRANSISCO Karimi is a 89 y.o. woman who presents to the office s/p hemiarthroplasty of the left hip on 4/14/23. Since her surgery she states she is feeling better. She is currently at [facility]. She is participating in PT five times a week. She is ambulating with a walker. She ambulated with a cane prior to the injury.  [de-identified] : The patient is sitting comfortably in the exam room.  LEFT hip -Skin is intact, no swelling, no ecchymosis -incisions clean and dry, no erythema -No tenderness to palpation over the greater trochanter -Painless range of motion hip -Sensation is intact L1-S1 -5/5 EHL, FHL, TA, GS, quadriceps, hamstrings -Foot is warm and well-perfused, palpable dorsalis pedis pulse  [de-identified] : Xrays of the left hip and AP pelvis were taken in the office today, 5/4/23.  X-rays show good overall alignment of the hip.  No significant displacement of the fracture.  The femoral component is well aligned. [de-identified] : - Weightbearing as tolerated\par -Tylenol for pain\par -Physical therapy: strengthening and gait training\par -Follow-up in 2 months with x-rays at that time\par -All the patient's questions and concerns were addressed during this visit\par  [de-identified] : 89-year-old woman s/p hemiarthroplasty of the left hip, approximately 3 weeks out.

## 2023-11-22 ENCOUNTER — APPOINTMENT (OUTPATIENT)
Dept: ORTHOPEDIC SURGERY | Facility: CLINIC | Age: 88
End: 2023-11-22

## 2024-09-16 NOTE — PATIENT PROFILE ADULT - NSPROPTRIGHTSUPPORTPERSON_GEN_A_NUR
Comment: 3/2021 treated with 5FU Render Risk Assessment In Note?: no Detail Level: Simple Comment: Norma ln2 treatment same name as above

## 2024-10-07 NOTE — OCCUPATIONAL THERAPY INITIAL EVALUATION ADULT - PHYSICAL ASSIST/NONPHYSICAL ASSIST: SIT/STAND, REHAB EVAL
GI Follow up Note  10/7/2024      CHIEF COMPLAINT:    Chief Complaint   Patient presents with    Alcohol Problem       SUBJECTIVE:      Monday update: no gi c/o, mom and girlfriend in room, patient says he will stop drinking     PROBLEM LIST:    Patient Active Problem List   Diagnosis    Alcoholic hepatitis without ascites  (CMD)    Alcohol dependence (CMD)    Alcoholic hepatitis, unspecified whether ascites present  (CMD)       HISTORIES:    ALLERGIES:  No Known Allergies  History reviewed. No pertinent past medical history.  History reviewed. No pertinent surgical history.  Social History     Tobacco Use    Smoking status: Never     Passive exposure: Never    Smokeless tobacco: Never   Substance Use Topics    Alcohol use: Yes     Comment: multiple beers and liquor     Drug Use:    Yes           Frequency: 1    per week       Special: Cocaine       Comment: used 1 week ago    History reviewed. No pertinent family history.     INPATIENT MEDICATIONS:  Current Facility-Administered Medications   Medication    folic acid (FOLATE) tablet 1 mg    thiamine (VITAMIN B1) tablet 100 mg    diazePAM (VALIUM) tablet 10 mg    enoxaparin (LOVENOX) injection 40 mg    ondansetron (ZOFRAN ODT) disintegrating tablet 4 mg    Or    ondansetron (ZOFRAN) injection 4 mg    Potassium Standard Replacement Protocol (Levels 3.5 and lower)    Magnesium Standard Replacement Protocol    Phosphorus Standard Replacement Protocol    calcium carbonate (TUMS) chewable tablet 500 mg    bismuth subsalicylate (PEPTO-BISMOL) chewable tablet 262 mg    pantoprazole (PROTONIX) EC tablet 40 mg    LORazepam (ATIVAN) tablet 2 mg    Or    LORazepam (ATIVAN) tablet 3 mg    Or    LORazepam (ATIVAN) tablet 4 mg    Or    LORazepam (ATIVAN) injection 2 mg    Or    LORazepam (ATIVAN) injection 3 mg    Or    LORazepam (ATIVAN) injection 4 mg    cloNIDine (CATAPRES) tablet 0.1 mg    losartan (COZAAR) tablet 50 mg    dextrose 50 % injection 25 g     dextrose 50 % injection 12.5 g    glucagon (GLUCAGEN) injection 1 mg    dextrose (GLUTOSE) 40 % gel 15 g    dextrose (GLUTOSE) 40 % gel 30 g    insulin lispro (ADMELOG,HumaLOG) - Correction Dose    insulin lispro (ADMELOG,HumaLOG) - Correction Dose    sodium chloride 0.9 % flush bag 25 mL    sodium chloride 0.9 % injection 2 mL       REVIEW OF SYSTEMS:    All other systems are reviewed and are negative except as documented in the history of present illness.    PHYSICAL EXAM:    Vital Signs: Blood pressure 134/87, pulse 77, temperature 98.1 °F (36.7 °C), temperature source Oral, resp. rate 16, height 6' 2\" (1.88 m), weight 96.8 kg (213 lb 6.5 oz), SpO2 98%.    Physical Exam  Vitals and nursing note reviewed.   HENT:      Head: Normocephalic.   Eyes:      Conjunctiva/sclera: Conjunctivae normal.   Cardiovascular:      Rate and Rhythm: Normal rate.   Pulmonary:      Effort: Pulmonary effort is normal. No respiratory distress.      Breath sounds: No wheezing.   Abdominal:      General: Bowel sounds are normal. There is no distension.      Palpations: Abdomen is soft. There is no mass.      Tenderness: There is no abdominal tenderness. There is no guarding or rebound.   Musculoskeletal:         General: Normal range of motion.      Cervical back: Normal range of motion.   Skin:     General: Skin is warm and dry.      Coloration: Skin is not pale.      Findings: No erythema or rash.   Neurological:      Mental Status: He is alert and oriented to person, place, and time.      Motor: No tremor.   Psychiatric:         Mood and Affect: Mood and affect normal.         Cognition and Memory: Memory normal.         Judgment: Judgment normal.          LABORATORY DATA and Imaging:   Recent Results (from the past 24 hour(s))   Prothrombin Time (INR/PT)    Collection Time: 10/06/24 10:57 AM    Specimen: Blood, Venous   Result Value Ref Range    Protime- PT 14.0 (H) 9.7 - 11.8 sec    INR 1.3     GLUCOSE, BEDSIDE - POINT OF CARE     Collection Time: 10/06/24 11:33 AM    Specimen: Blood   Result Value Ref Range    GLUCOSE, BEDSIDE - POINT OF CARE 410 (H) 70 - 99 mg/dL   GLUCOSE, BEDSIDE - POINT OF CARE    Collection Time: 10/06/24 11:40 AM    Specimen: Blood   Result Value Ref Range    GLUCOSE, BEDSIDE - POINT OF CARE 310 (H) 70 - 99 mg/dL   GLUCOSE, BEDSIDE - POINT OF CARE    Collection Time: 10/06/24  3:23 PM    Specimen: Blood   Result Value Ref Range    GLUCOSE, BEDSIDE - POINT OF CARE 216 (H) 70 - 99 mg/dL   GLUCOSE, BEDSIDE - POINT OF CARE    Collection Time: 10/06/24  8:34 PM    Specimen: Blood   Result Value Ref Range    GLUCOSE, BEDSIDE - POINT OF CARE 288 (H) 70 - 99 mg/dL   Magnesium    Collection Time: 10/07/24  4:53 AM    Specimen: Blood, Venous   Result Value Ref Range    Magnesium 1.9 1.7 - 2.4 mg/dL   Comprehensive Metabolic Panel    Collection Time: 10/07/24  4:53 AM    Specimen: Blood, Venous   Result Value Ref Range    Fasting Status      Sodium 138 135 - 145 mmol/L    Potassium 4.0 3.4 - 5.1 mmol/L    Chloride 102 97 - 110 mmol/L    Carbon Dioxide 30 21 - 32 mmol/L    Anion Gap 10 7 - 19 mmol/L    Glucose 168 (H) 70 - 99 mg/dL    BUN 8 6 - 20 mg/dL    Creatinine 0.91 0.67 - 1.17 mg/dL    Glomerular Filtration Rate >90 >=60    BUN/Cr 9 7 - 25    Calcium 8.6 8.4 - 10.2 mg/dL    Bilirubin, Total 1.5 (H) 0.2 - 1.0 mg/dL    GOT/ (H) <=37 Units/L    GPT/ (H) <64 Units/L    Alkaline Phosphatase 139 (H) 45 - 117 Units/L    Albumin 3.1 (L) 3.4 - 5.0 g/dL    Protein, Total 6.5 6.4 - 8.2 g/dL    Globulin 3.4 2.0 - 4.0 g/dL    A/G Ratio 0.9 (L) 1.0 - 2.4   Prothrombin Time (INR/PT)    Collection Time: 10/07/24  4:53 AM    Specimen: Blood, Venous   Result Value Ref Range    Protime- PT 11.9 (H) 9.7 - 11.8 sec    INR 1.1     CBC No Differential    Collection Time: 10/07/24  4:53 AM    Specimen: Blood, Venous   Result Value Ref Range    WBC 4.2 4.2 - 11.0 K/mcL    RBC 4.46 (L) 4.50 - 5.90 mil/mcL    HGB 14.3 13.0 - 17.0 g/dL     HCT 40.4 39.0 - 51.0 %    MCV 90.6 78.0 - 100.0 fl    MCH 31.6 26.0 - 34.0 pg    MCHC 34.9 32.0 - 36.5 g/dL     140 - 450 K/mcL    RDW-CV 13.4 11.0 - 15.0 %    RDW-SD 44.2 39.0 - 50.0 fL    NRBC 0 <=0 /100 WBC   GLUCOSE, BEDSIDE - POINT OF CARE    Collection Time: 10/07/24  5:40 AM    Specimen: Blood   Result Value Ref Range    GLUCOSE, BEDSIDE - POINT OF CARE 162 (H) 70 - 99 mg/dL         CT ABDOMEN PELVIS W CONTRAST   Final Result   Hepatomegaly with steatosis.      Trace bibasilar subsegmental atelectasis.      Small hiatal hernia.         Electronically Signed by: TAMIKO GAO DO    Signed on: 10/5/2024 7:37 AM    Workstation ID: [id number]            ASSESSMENT/PLAN:      31M with h/o etoh abuse, DUI on probation, cocaine use. Presented after family called for well check due to concerns about ams and hallucinations. GI consulted for elevated lfts. He was at [facility] in May with etoh hepatitis.     Initial ast/alt 1000s, bilirubin 2.6. Serum etoh 413. Acetaminophen and salicylate normal. INR 1.7. Utox negative.     CT AP with contrast - fatty enlarged liver  US May 2024 - fatty liver    - here with ams, hallucinations with heavy etoh use and found to have elevated lfts with imaging showing fatty liver  - LFT trend - improved significantly  - INR 1.1 today - improved    - ferritin 12K, iron 342 - likely reactive   - IgM 296   - pending labs - hcv rna quant, debi, liver kidney microsome antibody, ebv, hsv, hbv dna quant, asma, ama  - nh3 normal now  - negative labs - hepatitis panel, ceruloplasmin, hiv  - completed NAC protocol  - DF <32, no indication for steroids at this time  - CIWA per primary team  - needs strict etoh abstinence, rehab, heavy social support   - okay to dc home from gi standpoint - will need repeat labs with PCP in 2-3 weeks and f/u with gi/liver as needed     Case was discussed with patient, nursing, primary, family.    Thank you for involving our group in the care of this patient.   Please page us with concerns or questions.    Electronically signed by Aracely Askew CNP     verbal cues/nonverbal cues (demo/gestures)/2 person assist

## 2025-01-19 NOTE — PATIENT PROFILE ADULT - PATIENT'S SEXUAL ORIENTATION
67y male hx DM, HTN, HLD, ESRD on HD ( M, W, F), B/L Charcot deformity presents for failure to obtain access outpatient. last full HD on wednesday. still makes urine. otherwise feels well. no cp sob smith fever chills. R AVF without palpable thrill. bibasilar crackles, no increased wob. labs vbg xr screening ecg for possible hyperkalemia admit.
Heterosexual

## 2025-07-25 ENCOUNTER — INPATIENT (INPATIENT)
Facility: HOSPITAL | Age: 89
LOS: 5 days | Discharge: NURSING, REHAB OR LONG-TERM CARE | DRG: 293 | End: 2025-07-31
Attending: INTERNAL MEDICINE | Admitting: INTERNAL MEDICINE
Payer: COMMERCIAL

## 2025-07-25 VITALS
DIASTOLIC BLOOD PRESSURE: 68 MMHG | HEIGHT: 68 IN | WEIGHT: 149.91 LBS | HEART RATE: 64 BPM | SYSTOLIC BLOOD PRESSURE: 135 MMHG | TEMPERATURE: 98 F | RESPIRATION RATE: 15 BRPM | OXYGEN SATURATION: 97 %

## 2025-07-25 DIAGNOSIS — I50.9 HEART FAILURE, UNSPECIFIED: ICD-10-CM

## 2025-07-25 LAB
ALBUMIN SERPL ELPH-MCNC: 3 G/DL — LOW (ref 3.3–5)
ALBUMIN SERPL ELPH-MCNC: 3.1 G/DL — LOW (ref 3.3–5)
ALP SERPL-CCNC: 136 U/L — HIGH (ref 40–120)
ALP SERPL-CCNC: 141 U/L — HIGH (ref 40–120)
ALT FLD-CCNC: 564 U/L — HIGH (ref 10–45)
ALT FLD-CCNC: 579 U/L — HIGH (ref 10–45)
ANION GAP SERPL CALC-SCNC: 20 MMOL/L — HIGH (ref 5–17)
ANION GAP SERPL CALC-SCNC: 20 MMOL/L — HIGH (ref 5–17)
APPEARANCE UR: ABNORMAL
APTT BLD: 31 SEC — SIGNIFICANT CHANGE UP (ref 26.1–36.8)
AST SERPL-CCNC: 484 U/L — HIGH (ref 10–40)
AST SERPL-CCNC: 488 U/L — HIGH (ref 10–40)
BACTERIA # UR AUTO: ABNORMAL /HPF
BASOPHILS # BLD AUTO: 0.01 K/UL — SIGNIFICANT CHANGE UP (ref 0–0.2)
BASOPHILS NFR BLD AUTO: 0.1 % — SIGNIFICANT CHANGE UP (ref 0–2)
BILIRUB SERPL-MCNC: 0.8 MG/DL — SIGNIFICANT CHANGE UP (ref 0.2–1.2)
BILIRUB SERPL-MCNC: 1 MG/DL — SIGNIFICANT CHANGE UP (ref 0.2–1.2)
BILIRUB UR-MCNC: ABNORMAL
BUN SERPL-MCNC: 83 MG/DL — HIGH (ref 7–23)
BUN SERPL-MCNC: 87 MG/DL — HIGH (ref 7–23)
CALCIUM SERPL-MCNC: 8.2 MG/DL — LOW (ref 8.4–10.5)
CALCIUM SERPL-MCNC: 8.7 MG/DL — SIGNIFICANT CHANGE UP (ref 8.4–10.5)
CAST: 4 /LPF — SIGNIFICANT CHANGE UP (ref 0–4)
CHLORIDE SERPL-SCNC: 100 MMOL/L — SIGNIFICANT CHANGE UP (ref 96–108)
CHLORIDE SERPL-SCNC: 99 MMOL/L — SIGNIFICANT CHANGE UP (ref 96–108)
CO2 SERPL-SCNC: 13 MMOL/L — LOW (ref 22–31)
CO2 SERPL-SCNC: 15 MMOL/L — LOW (ref 22–31)
COLOR SPEC: SIGNIFICANT CHANGE UP
CREAT SERPL-MCNC: 4.19 MG/DL — HIGH (ref 0.5–1.3)
CREAT SERPL-MCNC: 4.46 MG/DL — HIGH (ref 0.5–1.3)
DIFF PNL FLD: NEGATIVE — SIGNIFICANT CHANGE UP
EGFR: 8 ML/MIN/1.73M2 — LOW
EGFR: 8 ML/MIN/1.73M2 — LOW
EGFR: 9 ML/MIN/1.73M2 — LOW
EGFR: 9 ML/MIN/1.73M2 — LOW
EOSINOPHIL # BLD AUTO: 0 K/UL — SIGNIFICANT CHANGE UP (ref 0–0.5)
EOSINOPHIL NFR BLD AUTO: 0 % — SIGNIFICANT CHANGE UP (ref 0–6)
FLUAV AG NPH QL: SIGNIFICANT CHANGE UP
FLUBV AG NPH QL: SIGNIFICANT CHANGE UP
GAS PNL BLDV: SIGNIFICANT CHANGE UP
GLUCOSE SERPL-MCNC: 103 MG/DL — HIGH (ref 70–99)
GLUCOSE SERPL-MCNC: 96 MG/DL — SIGNIFICANT CHANGE UP (ref 70–99)
GLUCOSE UR QL: NEGATIVE MG/DL — SIGNIFICANT CHANGE UP
HCT VFR BLD CALC: 30.8 % — LOW (ref 34.5–45)
HGB BLD-MCNC: 9.6 G/DL — LOW (ref 11.5–15.5)
IMM GRANULOCYTES # BLD AUTO: 0.04 K/UL — SIGNIFICANT CHANGE UP (ref 0–0.07)
IMM GRANULOCYTES NFR BLD AUTO: 0.6 % — SIGNIFICANT CHANGE UP (ref 0–0.9)
INR BLD: 1.92 RATIO — HIGH (ref 0.85–1.16)
KETONES UR QL: ABNORMAL MG/DL
LEUKOCYTE ESTERASE UR-ACNC: ABNORMAL
LYMPHOCYTES # BLD AUTO: 2.04 K/UL — SIGNIFICANT CHANGE UP (ref 1–3.3)
LYMPHOCYTES NFR BLD AUTO: 28.7 % — SIGNIFICANT CHANGE UP (ref 13–44)
MAGNESIUM SERPL-MCNC: 2.5 MG/DL — SIGNIFICANT CHANGE UP (ref 1.6–2.6)
MCHC RBC-ENTMCNC: 27.6 PG — SIGNIFICANT CHANGE UP (ref 27–34)
MCHC RBC-ENTMCNC: 31.2 G/DL — LOW (ref 32–36)
MCV RBC AUTO: 88.5 FL — SIGNIFICANT CHANGE UP (ref 80–100)
MONOCYTES # BLD AUTO: 0.66 K/UL — SIGNIFICANT CHANGE UP (ref 0–0.9)
MONOCYTES NFR BLD AUTO: 9.3 % — SIGNIFICANT CHANGE UP (ref 2–14)
NEUTROPHILS # BLD AUTO: 4.35 K/UL — SIGNIFICANT CHANGE UP (ref 1.8–7.4)
NEUTROPHILS NFR BLD AUTO: 61.3 % — SIGNIFICANT CHANGE UP (ref 43–77)
NITRITE UR-MCNC: POSITIVE
NRBC # BLD AUTO: 0.06 K/UL — HIGH (ref 0–0)
NRBC # FLD: 0.06 K/UL — HIGH (ref 0–0)
NRBC BLD AUTO-RTO: 0 /100 WBCS — SIGNIFICANT CHANGE UP (ref 0–0)
NT-PROBNP SERPL-SCNC: HIGH PG/ML (ref 0–300)
PH UR: 5 — SIGNIFICANT CHANGE UP (ref 5–8)
PHOSPHATE SERPL-MCNC: 6.8 MG/DL — HIGH (ref 2.5–4.5)
PLATELET # BLD AUTO: 173 K/UL — SIGNIFICANT CHANGE UP (ref 150–400)
PMV BLD: 11.2 FL — SIGNIFICANT CHANGE UP (ref 7–13)
POTASSIUM SERPL-MCNC: 5.1 MMOL/L — SIGNIFICANT CHANGE UP (ref 3.5–5.3)
POTASSIUM SERPL-MCNC: 6.6 MMOL/L — CRITICAL HIGH (ref 3.5–5.3)
POTASSIUM SERPL-SCNC: 5.1 MMOL/L — SIGNIFICANT CHANGE UP (ref 3.5–5.3)
POTASSIUM SERPL-SCNC: 6.6 MMOL/L — CRITICAL HIGH (ref 3.5–5.3)
PROT SERPL-MCNC: 6.2 G/DL — SIGNIFICANT CHANGE UP (ref 6–8.3)
PROT SERPL-MCNC: 6.2 G/DL — SIGNIFICANT CHANGE UP (ref 6–8.3)
PROT UR-MCNC: >=1000 MG/DL
PROTHROM AB SERPL-ACNC: 21.9 SEC — HIGH (ref 9.9–13.4)
RBC # BLD: 3.48 M/UL — LOW (ref 3.8–5.2)
RBC # FLD: 15.2 % — HIGH (ref 10.3–14.5)
RBC CASTS # UR COMP ASSIST: 1 /HPF — SIGNIFICANT CHANGE UP (ref 0–4)
REVIEW: SIGNIFICANT CHANGE UP
RSV RNA NPH QL NAA+NON-PROBE: SIGNIFICANT CHANGE UP
SARS-COV-2 RNA SPEC QL NAA+PROBE: SIGNIFICANT CHANGE UP
SODIUM SERPL-SCNC: 133 MMOL/L — LOW (ref 135–145)
SODIUM SERPL-SCNC: 134 MMOL/L — LOW (ref 135–145)
SOURCE RESPIRATORY: SIGNIFICANT CHANGE UP
SP GR SPEC: >1.03 — HIGH (ref 1–1.03)
SQUAMOUS # UR AUTO: 24 /HPF — HIGH (ref 0–5)
TROPONIN T, HIGH SENSITIVITY RESULT: 423 NG/L — HIGH (ref 0–51)
UROBILINOGEN FLD QL: 1 MG/DL — SIGNIFICANT CHANGE UP (ref 0.2–1)
WBC # BLD: 7.1 K/UL — SIGNIFICANT CHANGE UP (ref 3.8–10.5)
WBC # FLD AUTO: 7.1 K/UL — SIGNIFICANT CHANGE UP (ref 3.8–10.5)
WBC UR QL: 60 /HPF — HIGH (ref 0–5)

## 2025-07-25 PROCEDURE — 84295 ASSAY OF SERUM SODIUM: CPT

## 2025-07-25 PROCEDURE — 71045 X-RAY EXAM CHEST 1 VIEW: CPT

## 2025-07-25 PROCEDURE — 36415 COLL VENOUS BLD VENIPUNCTURE: CPT

## 2025-07-25 PROCEDURE — 84100 ASSAY OF PHOSPHORUS: CPT

## 2025-07-25 PROCEDURE — 71045 X-RAY EXAM CHEST 1 VIEW: CPT | Mod: 26

## 2025-07-25 PROCEDURE — 99285 EMERGENCY DEPT VISIT HI MDM: CPT

## 2025-07-25 PROCEDURE — 85014 HEMATOCRIT: CPT

## 2025-07-25 PROCEDURE — 82330 ASSAY OF CALCIUM: CPT

## 2025-07-25 PROCEDURE — 84484 ASSAY OF TROPONIN QUANT: CPT

## 2025-07-25 PROCEDURE — 80053 COMPREHEN METABOLIC PANEL: CPT

## 2025-07-25 PROCEDURE — 81001 URINALYSIS AUTO W/SCOPE: CPT

## 2025-07-25 PROCEDURE — 85730 THROMBOPLASTIN TIME PARTIAL: CPT

## 2025-07-25 PROCEDURE — 93010 ELECTROCARDIOGRAM REPORT: CPT

## 2025-07-25 PROCEDURE — 85610 PROTHROMBIN TIME: CPT

## 2025-07-25 PROCEDURE — 87637 SARSCOV2&INF A&B&RSV AMP PRB: CPT

## 2025-07-25 PROCEDURE — 82435 ASSAY OF BLOOD CHLORIDE: CPT

## 2025-07-25 PROCEDURE — 82803 BLOOD GASES ANY COMBINATION: CPT

## 2025-07-25 PROCEDURE — 83880 ASSAY OF NATRIURETIC PEPTIDE: CPT

## 2025-07-25 PROCEDURE — 85018 HEMOGLOBIN: CPT

## 2025-07-25 PROCEDURE — 85025 COMPLETE CBC W/AUTO DIFF WBC: CPT

## 2025-07-25 PROCEDURE — 83605 ASSAY OF LACTIC ACID: CPT

## 2025-07-25 PROCEDURE — 83735 ASSAY OF MAGNESIUM: CPT

## 2025-07-25 PROCEDURE — 82947 ASSAY GLUCOSE BLOOD QUANT: CPT

## 2025-07-25 PROCEDURE — 84132 ASSAY OF SERUM POTASSIUM: CPT

## 2025-07-25 RX ORDER — CARVEDILOL 3.12 MG/1
1 TABLET, FILM COATED ORAL
Refills: 0 | DISCHARGE

## 2025-07-25 RX ORDER — TORSEMIDE 10 MG
1 TABLET ORAL
Refills: 0 | DISCHARGE

## 2025-07-25 RX ORDER — FUROSEMIDE 10 MG/ML
40 INJECTION INTRAMUSCULAR; INTRAVENOUS ONCE
Refills: 0 | Status: COMPLETED | OUTPATIENT
Start: 2025-07-25 | End: 2025-07-25

## 2025-07-25 RX ORDER — APIXABAN 5 MG/1
1 TABLET, FILM COATED ORAL
Refills: 0 | DISCHARGE

## 2025-07-25 RX ADMIN — FUROSEMIDE 40 MILLIGRAM(S): 10 INJECTION INTRAMUSCULAR; INTRAVENOUS at 13:11

## 2025-07-26 DIAGNOSIS — E87.5 HYPERKALEMIA: ICD-10-CM

## 2025-07-26 DIAGNOSIS — I10 ESSENTIAL (PRIMARY) HYPERTENSION: ICD-10-CM

## 2025-07-26 DIAGNOSIS — I50.9 HEART FAILURE, UNSPECIFIED: ICD-10-CM

## 2025-07-26 DIAGNOSIS — N18.30 CHRONIC KIDNEY DISEASE, STAGE 3 UNSPECIFIED: ICD-10-CM

## 2025-07-26 DIAGNOSIS — Z86.718 PERSONAL HISTORY OF OTHER VENOUS THROMBOSIS AND EMBOLISM: ICD-10-CM

## 2025-07-26 LAB
ALBUMIN SERPL ELPH-MCNC: 3 G/DL — LOW (ref 3.3–5)
ALP SERPL-CCNC: 156 U/L — HIGH (ref 40–120)
ALT FLD-CCNC: 599 U/L — HIGH (ref 10–45)
ANION GAP SERPL CALC-SCNC: 21 MMOL/L — HIGH (ref 5–17)
AST SERPL-CCNC: 425 U/L — HIGH (ref 10–40)
BILIRUB SERPL-MCNC: 0.6 MG/DL — SIGNIFICANT CHANGE UP (ref 0.2–1.2)
BUN SERPL-MCNC: 91 MG/DL — HIGH (ref 7–23)
CALCIUM SERPL-MCNC: 8.3 MG/DL — LOW (ref 8.4–10.5)
CHLORIDE SERPL-SCNC: 98 MMOL/L — SIGNIFICANT CHANGE UP (ref 96–108)
CO2 SERPL-SCNC: 17 MMOL/L — LOW (ref 22–31)
CREAT ?TM UR-MCNC: 179 MG/DL — SIGNIFICANT CHANGE UP
CREAT SERPL-MCNC: 4.47 MG/DL — HIGH (ref 0.5–1.3)
EGFR: 8 ML/MIN/1.73M2 — LOW
EGFR: 8 ML/MIN/1.73M2 — LOW
GLUCOSE SERPL-MCNC: 158 MG/DL — HIGH (ref 70–99)
HCT VFR BLD CALC: 30.5 % — LOW (ref 34.5–45)
HGB BLD-MCNC: 9.4 G/DL — LOW (ref 11.5–15.5)
MCHC RBC-ENTMCNC: 27.6 PG — SIGNIFICANT CHANGE UP (ref 27–34)
MCHC RBC-ENTMCNC: 30.8 G/DL — LOW (ref 32–36)
MCV RBC AUTO: 89.4 FL — SIGNIFICANT CHANGE UP (ref 80–100)
NRBC # BLD AUTO: 0.11 K/UL — HIGH (ref 0–0)
NRBC # FLD: 0.11 K/UL — HIGH (ref 0–0)
NRBC BLD AUTO-RTO: 1 /100 WBCS — HIGH (ref 0–0)
PLATELET # BLD AUTO: 151 K/UL — SIGNIFICANT CHANGE UP (ref 150–400)
PMV BLD: 11 FL — SIGNIFICANT CHANGE UP (ref 7–13)
POTASSIUM SERPL-MCNC: 4.6 MMOL/L — SIGNIFICANT CHANGE UP (ref 3.5–5.3)
POTASSIUM SERPL-SCNC: 4.6 MMOL/L — SIGNIFICANT CHANGE UP (ref 3.5–5.3)
PROT ?TM UR-MCNC: 828 MG/DL — HIGH (ref 0–12)
PROT SERPL-MCNC: 6 G/DL — SIGNIFICANT CHANGE UP (ref 6–8.3)
PROT/CREAT UR-RTO: 4.6 RATIO — HIGH (ref 0–0.2)
RBC # BLD: 3.41 M/UL — LOW (ref 3.8–5.2)
RBC # FLD: 15.2 % — HIGH (ref 10.3–14.5)
SODIUM SERPL-SCNC: 136 MMOL/L — SIGNIFICANT CHANGE UP (ref 135–145)
WBC # BLD: 8.05 K/UL — SIGNIFICANT CHANGE UP (ref 3.8–10.5)
WBC # FLD AUTO: 8.05 K/UL — SIGNIFICANT CHANGE UP (ref 3.8–10.5)

## 2025-07-26 PROCEDURE — 83880 ASSAY OF NATRIURETIC PEPTIDE: CPT

## 2025-07-26 PROCEDURE — 85018 HEMOGLOBIN: CPT

## 2025-07-26 PROCEDURE — 76700 US EXAM ABDOM COMPLETE: CPT | Mod: 26

## 2025-07-26 PROCEDURE — 83605 ASSAY OF LACTIC ACID: CPT

## 2025-07-26 PROCEDURE — 84100 ASSAY OF PHOSPHORUS: CPT

## 2025-07-26 PROCEDURE — 81001 URINALYSIS AUTO W/SCOPE: CPT

## 2025-07-26 PROCEDURE — 85027 COMPLETE CBC AUTOMATED: CPT

## 2025-07-26 PROCEDURE — 82570 ASSAY OF URINE CREATININE: CPT

## 2025-07-26 PROCEDURE — 85730 THROMBOPLASTIN TIME PARTIAL: CPT

## 2025-07-26 PROCEDURE — 71045 X-RAY EXAM CHEST 1 VIEW: CPT

## 2025-07-26 PROCEDURE — 84156 ASSAY OF PROTEIN URINE: CPT

## 2025-07-26 PROCEDURE — 84132 ASSAY OF SERUM POTASSIUM: CPT

## 2025-07-26 PROCEDURE — 80053 COMPREHEN METABOLIC PANEL: CPT

## 2025-07-26 PROCEDURE — 87086 URINE CULTURE/COLONY COUNT: CPT

## 2025-07-26 PROCEDURE — 85610 PROTHROMBIN TIME: CPT

## 2025-07-26 PROCEDURE — 82803 BLOOD GASES ANY COMBINATION: CPT

## 2025-07-26 PROCEDURE — 84295 ASSAY OF SERUM SODIUM: CPT

## 2025-07-26 PROCEDURE — 85025 COMPLETE CBC W/AUTO DIFF WBC: CPT

## 2025-07-26 PROCEDURE — 36415 COLL VENOUS BLD VENIPUNCTURE: CPT

## 2025-07-26 PROCEDURE — 82330 ASSAY OF CALCIUM: CPT

## 2025-07-26 PROCEDURE — 82947 ASSAY GLUCOSE BLOOD QUANT: CPT

## 2025-07-26 PROCEDURE — 85014 HEMATOCRIT: CPT

## 2025-07-26 PROCEDURE — 84484 ASSAY OF TROPONIN QUANT: CPT

## 2025-07-26 PROCEDURE — 82435 ASSAY OF BLOOD CHLORIDE: CPT

## 2025-07-26 PROCEDURE — 87637 SARSCOV2&INF A&B&RSV AMP PRB: CPT

## 2025-07-26 PROCEDURE — 76700 US EXAM ABDOM COMPLETE: CPT

## 2025-07-26 PROCEDURE — 83735 ASSAY OF MAGNESIUM: CPT

## 2025-07-26 RX ORDER — ATORVASTATIN CALCIUM 80 MG/1
20 TABLET, FILM COATED ORAL AT BEDTIME
Refills: 0 | Status: DISCONTINUED | OUTPATIENT
Start: 2025-07-26 | End: 2025-07-27

## 2025-07-26 RX ORDER — TIMOLOL MALEATE 6.8 MG/ML
1 SOLUTION OPHTHALMIC
Refills: 0 | Status: DISCONTINUED | OUTPATIENT
Start: 2025-07-26 | End: 2025-07-31

## 2025-07-26 RX ORDER — CARVEDILOL 3.12 MG/1
3.12 TABLET, FILM COATED ORAL EVERY 12 HOURS
Refills: 0 | Status: DISCONTINUED | OUTPATIENT
Start: 2025-07-26 | End: 2025-07-31

## 2025-07-26 RX ORDER — CEFTRIAXONE 500 MG/1
1000 INJECTION, POWDER, FOR SOLUTION INTRAMUSCULAR; INTRAVENOUS EVERY 24 HOURS
Refills: 0 | Status: COMPLETED | OUTPATIENT
Start: 2025-07-26 | End: 2025-07-28

## 2025-07-26 RX ORDER — SODIUM BICARBONATE 1 MEQ/ML
650 SYRINGE (ML) INTRAVENOUS
Refills: 0 | Status: DISCONTINUED | OUTPATIENT
Start: 2025-07-26 | End: 2025-07-31

## 2025-07-26 RX ORDER — FUROSEMIDE 10 MG/ML
40 INJECTION INTRAMUSCULAR; INTRAVENOUS
Refills: 0 | Status: DISCONTINUED | OUTPATIENT
Start: 2025-07-26 | End: 2025-07-26

## 2025-07-26 RX ORDER — FUROSEMIDE 10 MG/ML
40 INJECTION INTRAMUSCULAR; INTRAVENOUS DAILY
Refills: 0 | Status: DISCONTINUED | OUTPATIENT
Start: 2025-07-26 | End: 2025-07-26

## 2025-07-26 RX ORDER — FUROSEMIDE 10 MG/ML
40 INJECTION INTRAMUSCULAR; INTRAVENOUS
Refills: 0 | Status: COMPLETED | OUTPATIENT
Start: 2025-07-26 | End: 2025-07-27

## 2025-07-26 RX ORDER — BRIMONIDINE TARTRATE 1.5 MG/ML
1 SOLUTION/ DROPS OPHTHALMIC
Refills: 0 | Status: DISCONTINUED | OUTPATIENT
Start: 2025-07-26 | End: 2025-07-31

## 2025-07-26 RX ORDER — APIXABAN 5 MG/1
2.5 TABLET, FILM COATED ORAL
Refills: 0 | Status: DISCONTINUED | OUTPATIENT
Start: 2025-07-26 | End: 2025-07-31

## 2025-07-26 RX ADMIN — APIXABAN 2.5 MILLIGRAM(S): 5 TABLET, FILM COATED ORAL at 17:23

## 2025-07-26 RX ADMIN — CARVEDILOL 3.12 MILLIGRAM(S): 3.12 TABLET, FILM COATED ORAL at 05:27

## 2025-07-26 RX ADMIN — BRIMONIDINE TARTRATE 1 DROP(S): 1.5 SOLUTION/ DROPS OPHTHALMIC at 17:20

## 2025-07-26 RX ADMIN — Medication 50 MILLIGRAM(S): at 17:20

## 2025-07-26 RX ADMIN — BRIMONIDINE TARTRATE 1 DROP(S): 1.5 SOLUTION/ DROPS OPHTHALMIC at 05:28

## 2025-07-26 RX ADMIN — APIXABAN 2.5 MILLIGRAM(S): 5 TABLET, FILM COATED ORAL at 05:27

## 2025-07-26 RX ADMIN — FUROSEMIDE 40 MILLIGRAM(S): 10 INJECTION INTRAMUSCULAR; INTRAVENOUS at 05:28

## 2025-07-26 RX ADMIN — FUROSEMIDE 40 MILLIGRAM(S): 10 INJECTION INTRAMUSCULAR; INTRAVENOUS at 12:58

## 2025-07-26 RX ADMIN — ATORVASTATIN CALCIUM 20 MILLIGRAM(S): 80 TABLET, FILM COATED ORAL at 21:21

## 2025-07-26 RX ADMIN — Medication 40 MILLIGRAM(S): at 05:27

## 2025-07-26 RX ADMIN — TIMOLOL MALEATE 1 DROP(S): 6.8 SOLUTION OPHTHALMIC at 05:28

## 2025-07-26 RX ADMIN — CEFTRIAXONE 100 MILLIGRAM(S): 500 INJECTION, POWDER, FOR SOLUTION INTRAMUSCULAR; INTRAVENOUS at 08:31

## 2025-07-26 RX ADMIN — Medication 650 MILLIGRAM(S): at 17:23

## 2025-07-26 RX ADMIN — TIMOLOL MALEATE 1 DROP(S): 6.8 SOLUTION OPHTHALMIC at 17:20

## 2025-07-27 LAB
ALBUMIN SERPL ELPH-MCNC: 3 G/DL — LOW (ref 3.3–5)
ALP SERPL-CCNC: 149 U/L — HIGH (ref 40–120)
ALT FLD-CCNC: 584 U/L — HIGH (ref 10–45)
ANION GAP SERPL CALC-SCNC: 19 MMOL/L — HIGH (ref 5–17)
AST SERPL-CCNC: 329 U/L — HIGH (ref 10–40)
BILIRUB SERPL-MCNC: 0.7 MG/DL — SIGNIFICANT CHANGE UP (ref 0.2–1.2)
BUN SERPL-MCNC: 95 MG/DL — HIGH (ref 7–23)
CALCIUM SERPL-MCNC: 8.3 MG/DL — LOW (ref 8.4–10.5)
CHLORIDE SERPL-SCNC: 99 MMOL/L — SIGNIFICANT CHANGE UP (ref 96–108)
CO2 SERPL-SCNC: 17 MMOL/L — LOW (ref 22–31)
CREAT SERPL-MCNC: 4.63 MG/DL — HIGH (ref 0.5–1.3)
EGFR: 8 ML/MIN/1.73M2 — LOW
EGFR: 8 ML/MIN/1.73M2 — LOW
FERRITIN SERPL-MCNC: 55 NG/ML — SIGNIFICANT CHANGE UP (ref 13–330)
GLUCOSE SERPL-MCNC: 100 MG/DL — HIGH (ref 70–99)
HCT VFR BLD CALC: 29.3 % — LOW (ref 34.5–45)
HGB BLD-MCNC: 9.4 G/DL — LOW (ref 11.5–15.5)
IRON SATN MFR SERPL: 14 UG/DL — LOW (ref 30–160)
IRON SATN MFR SERPL: 6 % — LOW (ref 14–50)
MCHC RBC-ENTMCNC: 28 PG — SIGNIFICANT CHANGE UP (ref 27–34)
MCHC RBC-ENTMCNC: 32.1 G/DL — SIGNIFICANT CHANGE UP (ref 32–36)
MCV RBC AUTO: 87.2 FL — SIGNIFICANT CHANGE UP (ref 80–100)
NRBC # BLD AUTO: 0.27 K/UL — HIGH (ref 0–0)
NRBC # FLD: 0.27 K/UL — HIGH (ref 0–0)
NRBC BLD AUTO-RTO: 3 /100 WBCS — HIGH (ref 0–0)
PHOSPHATE SERPL-MCNC: 6.3 MG/DL — HIGH (ref 2.5–4.5)
PLATELET # BLD AUTO: 152 K/UL — SIGNIFICANT CHANGE UP (ref 150–400)
PMV BLD: 11.5 FL — SIGNIFICANT CHANGE UP (ref 7–13)
POTASSIUM SERPL-MCNC: 4.8 MMOL/L — SIGNIFICANT CHANGE UP (ref 3.5–5.3)
POTASSIUM SERPL-SCNC: 4.8 MMOL/L — SIGNIFICANT CHANGE UP (ref 3.5–5.3)
PROT SERPL-MCNC: 6.2 G/DL — SIGNIFICANT CHANGE UP (ref 6–8.3)
RBC # BLD: 3.36 M/UL — LOW (ref 3.8–5.2)
RBC # FLD: 14.9 % — HIGH (ref 10.3–14.5)
SODIUM SERPL-SCNC: 135 MMOL/L — SIGNIFICANT CHANGE UP (ref 135–145)
TIBC SERPL-MCNC: 231 UG/DL — SIGNIFICANT CHANGE UP (ref 220–430)
UIBC SERPL-MCNC: 217 UG/DL — SIGNIFICANT CHANGE UP (ref 110–370)
WBC # BLD: 8.27 K/UL — SIGNIFICANT CHANGE UP (ref 3.8–10.5)
WBC # FLD AUTO: 8.27 K/UL — SIGNIFICANT CHANGE UP (ref 3.8–10.5)

## 2025-07-27 PROCEDURE — 87637 SARSCOV2&INF A&B&RSV AMP PRB: CPT

## 2025-07-27 PROCEDURE — 71045 X-RAY EXAM CHEST 1 VIEW: CPT

## 2025-07-27 PROCEDURE — 81001 URINALYSIS AUTO W/SCOPE: CPT

## 2025-07-27 PROCEDURE — 84100 ASSAY OF PHOSPHORUS: CPT

## 2025-07-27 PROCEDURE — 82803 BLOOD GASES ANY COMBINATION: CPT

## 2025-07-27 PROCEDURE — 85018 HEMOGLOBIN: CPT

## 2025-07-27 PROCEDURE — 87086 URINE CULTURE/COLONY COUNT: CPT

## 2025-07-27 PROCEDURE — 83605 ASSAY OF LACTIC ACID: CPT

## 2025-07-27 PROCEDURE — 83880 ASSAY OF NATRIURETIC PEPTIDE: CPT

## 2025-07-27 PROCEDURE — 83540 ASSAY OF IRON: CPT

## 2025-07-27 PROCEDURE — 85025 COMPLETE CBC W/AUTO DIFF WBC: CPT

## 2025-07-27 PROCEDURE — 82728 ASSAY OF FERRITIN: CPT

## 2025-07-27 PROCEDURE — 82435 ASSAY OF BLOOD CHLORIDE: CPT

## 2025-07-27 PROCEDURE — 82570 ASSAY OF URINE CREATININE: CPT

## 2025-07-27 PROCEDURE — 84295 ASSAY OF SERUM SODIUM: CPT

## 2025-07-27 PROCEDURE — 84156 ASSAY OF PROTEIN URINE: CPT

## 2025-07-27 PROCEDURE — 85014 HEMATOCRIT: CPT

## 2025-07-27 PROCEDURE — 82330 ASSAY OF CALCIUM: CPT

## 2025-07-27 PROCEDURE — 84132 ASSAY OF SERUM POTASSIUM: CPT

## 2025-07-27 PROCEDURE — 85610 PROTHROMBIN TIME: CPT

## 2025-07-27 PROCEDURE — 85027 COMPLETE CBC AUTOMATED: CPT

## 2025-07-27 PROCEDURE — 80053 COMPREHEN METABOLIC PANEL: CPT

## 2025-07-27 PROCEDURE — 83550 IRON BINDING TEST: CPT

## 2025-07-27 PROCEDURE — 76700 US EXAM ABDOM COMPLETE: CPT

## 2025-07-27 PROCEDURE — 85730 THROMBOPLASTIN TIME PARTIAL: CPT

## 2025-07-27 PROCEDURE — 36415 COLL VENOUS BLD VENIPUNCTURE: CPT

## 2025-07-27 PROCEDURE — 83735 ASSAY OF MAGNESIUM: CPT

## 2025-07-27 PROCEDURE — 84484 ASSAY OF TROPONIN QUANT: CPT

## 2025-07-27 PROCEDURE — 82947 ASSAY GLUCOSE BLOOD QUANT: CPT

## 2025-07-27 RX ADMIN — BRIMONIDINE TARTRATE 1 DROP(S): 1.5 SOLUTION/ DROPS OPHTHALMIC at 17:30

## 2025-07-27 RX ADMIN — CEFTRIAXONE 100 MILLIGRAM(S): 500 INJECTION, POWDER, FOR SOLUTION INTRAMUSCULAR; INTRAVENOUS at 08:30

## 2025-07-27 RX ADMIN — ATORVASTATIN CALCIUM 20 MILLIGRAM(S): 80 TABLET, FILM COATED ORAL at 21:18

## 2025-07-27 RX ADMIN — Medication 650 MILLIGRAM(S): at 05:33

## 2025-07-27 RX ADMIN — Medication 50 MILLIGRAM(S): at 05:33

## 2025-07-27 RX ADMIN — CARVEDILOL 3.12 MILLIGRAM(S): 3.12 TABLET, FILM COATED ORAL at 17:29

## 2025-07-27 RX ADMIN — TIMOLOL MALEATE 1 DROP(S): 6.8 SOLUTION OPHTHALMIC at 17:30

## 2025-07-27 RX ADMIN — TIMOLOL MALEATE 1 DROP(S): 6.8 SOLUTION OPHTHALMIC at 05:34

## 2025-07-27 RX ADMIN — APIXABAN 2.5 MILLIGRAM(S): 5 TABLET, FILM COATED ORAL at 17:30

## 2025-07-27 RX ADMIN — APIXABAN 2.5 MILLIGRAM(S): 5 TABLET, FILM COATED ORAL at 05:34

## 2025-07-27 RX ADMIN — Medication 40 MILLIGRAM(S): at 05:33

## 2025-07-27 RX ADMIN — BRIMONIDINE TARTRATE 1 DROP(S): 1.5 SOLUTION/ DROPS OPHTHALMIC at 05:34

## 2025-07-27 RX ADMIN — FUROSEMIDE 40 MILLIGRAM(S): 10 INJECTION INTRAMUSCULAR; INTRAVENOUS at 05:33

## 2025-07-27 RX ADMIN — Medication 650 MILLIGRAM(S): at 17:30

## 2025-07-27 RX ADMIN — Medication 50 MILLIGRAM(S): at 17:30

## 2025-07-27 RX ADMIN — CARVEDILOL 3.12 MILLIGRAM(S): 3.12 TABLET, FILM COATED ORAL at 05:34

## 2025-07-28 ENCOUNTER — RESULT REVIEW (OUTPATIENT)
Age: 89
End: 2025-07-28

## 2025-07-28 LAB
ALBUMIN SERPL ELPH-MCNC: 3 G/DL — LOW (ref 3.3–5)
ALP SERPL-CCNC: 154 U/L — HIGH (ref 40–120)
ALT FLD-CCNC: 543 U/L — HIGH (ref 10–45)
ANION GAP SERPL CALC-SCNC: 22 MMOL/L — HIGH (ref 5–17)
AST SERPL-CCNC: 271 U/L — HIGH (ref 10–40)
BILIRUB SERPL-MCNC: 0.7 MG/DL — SIGNIFICANT CHANGE UP (ref 0.2–1.2)
BUN SERPL-MCNC: 95 MG/DL — HIGH (ref 7–23)
CALCIUM SERPL-MCNC: 8.2 MG/DL — LOW (ref 8.4–10.5)
CHLORIDE SERPL-SCNC: 96 MMOL/L — SIGNIFICANT CHANGE UP (ref 96–108)
CO2 SERPL-SCNC: 17 MMOL/L — LOW (ref 22–31)
CREAT SERPL-MCNC: 4.28 MG/DL — HIGH (ref 0.5–1.3)
EGFR: 9 ML/MIN/1.73M2 — LOW
EGFR: 9 ML/MIN/1.73M2 — LOW
GLUCOSE SERPL-MCNC: 112 MG/DL — HIGH (ref 70–99)
HAV IGM SER-ACNC: SIGNIFICANT CHANGE UP
HBV CORE IGM SER-ACNC: SIGNIFICANT CHANGE UP
HBV SURFACE AG SER-ACNC: SIGNIFICANT CHANGE UP
HCV AB S/CO SERPL IA: 0.16 S/CO — SIGNIFICANT CHANGE UP (ref 0–0.79)
HCV AB SERPL-IMP: SIGNIFICANT CHANGE UP
POTASSIUM SERPL-MCNC: 4.6 MMOL/L — SIGNIFICANT CHANGE UP (ref 3.5–5.3)
POTASSIUM SERPL-SCNC: 4.6 MMOL/L — SIGNIFICANT CHANGE UP (ref 3.5–5.3)
PROT SERPL-MCNC: 6 G/DL — SIGNIFICANT CHANGE UP (ref 6–8.3)
SODIUM SERPL-SCNC: 135 MMOL/L — SIGNIFICANT CHANGE UP (ref 135–145)

## 2025-07-28 PROCEDURE — 93356 MYOCRD STRAIN IMG SPCKL TRCK: CPT

## 2025-07-28 PROCEDURE — 93306 TTE W/DOPPLER COMPLETE: CPT | Mod: 26

## 2025-07-28 RX ORDER — BUMETANIDE 1 MG/1
1 TABLET ORAL ONCE
Refills: 0 | Status: COMPLETED | OUTPATIENT
Start: 2025-07-28 | End: 2025-07-28

## 2025-07-28 RX ADMIN — BRIMONIDINE TARTRATE 1 DROP(S): 1.5 SOLUTION/ DROPS OPHTHALMIC at 17:26

## 2025-07-28 RX ADMIN — TIMOLOL MALEATE 1 DROP(S): 6.8 SOLUTION OPHTHALMIC at 17:26

## 2025-07-28 RX ADMIN — TIMOLOL MALEATE 1 DROP(S): 6.8 SOLUTION OPHTHALMIC at 05:26

## 2025-07-28 RX ADMIN — Medication 650 MILLIGRAM(S): at 17:34

## 2025-07-28 RX ADMIN — APIXABAN 2.5 MILLIGRAM(S): 5 TABLET, FILM COATED ORAL at 05:26

## 2025-07-28 RX ADMIN — CEFTRIAXONE 100 MILLIGRAM(S): 500 INJECTION, POWDER, FOR SOLUTION INTRAMUSCULAR; INTRAVENOUS at 10:19

## 2025-07-28 RX ADMIN — Medication 50 MILLIGRAM(S): at 05:27

## 2025-07-28 RX ADMIN — Medication 650 MILLIGRAM(S): at 05:27

## 2025-07-28 RX ADMIN — APIXABAN 2.5 MILLIGRAM(S): 5 TABLET, FILM COATED ORAL at 17:27

## 2025-07-28 RX ADMIN — BUMETANIDE 1 MILLIGRAM(S): 1 TABLET ORAL at 17:26

## 2025-07-28 RX ADMIN — Medication 50 MILLIGRAM(S): at 17:26

## 2025-07-28 RX ADMIN — BRIMONIDINE TARTRATE 1 DROP(S): 1.5 SOLUTION/ DROPS OPHTHALMIC at 05:26

## 2025-07-28 RX ADMIN — Medication 40 MILLIGRAM(S): at 05:26

## 2025-07-29 LAB
-  AMOXICILLIN/CLAVULANIC ACID: SIGNIFICANT CHANGE UP
-  AMPICILLIN/SULBACTAM: SIGNIFICANT CHANGE UP
-  AMPICILLIN: SIGNIFICANT CHANGE UP
-  AZTREONAM: SIGNIFICANT CHANGE UP
-  CEFAZOLIN: SIGNIFICANT CHANGE UP
-  CEFEPIME: SIGNIFICANT CHANGE UP
-  CEFOXITIN: SIGNIFICANT CHANGE UP
-  CEFTRIAXONE: SIGNIFICANT CHANGE UP
-  CEFUROXIME: SIGNIFICANT CHANGE UP
-  CIPROFLOXACIN: SIGNIFICANT CHANGE UP
-  ERTAPENEM: SIGNIFICANT CHANGE UP
-  GENTAMICIN: SIGNIFICANT CHANGE UP
-  IMIPENEM: SIGNIFICANT CHANGE UP
-  LEVOFLOXACIN: SIGNIFICANT CHANGE UP
-  MEROPENEM: SIGNIFICANT CHANGE UP
-  NITROFURANTOIN: SIGNIFICANT CHANGE UP
-  PIPERACILLIN/TAZOBACTAM: SIGNIFICANT CHANGE UP
-  TIGECYCLINE: SIGNIFICANT CHANGE UP
-  TOBRAMYCIN: SIGNIFICANT CHANGE UP
-  TRIMETHOPRIM/SULFAMETHOXAZOLE: SIGNIFICANT CHANGE UP
ALBUMIN SERPL ELPH-MCNC: 2.9 G/DL — LOW (ref 3.3–5)
ALP SERPL-CCNC: 160 U/L — HIGH (ref 40–120)
ALT FLD-CCNC: 457 U/L — HIGH (ref 10–45)
ANION GAP SERPL CALC-SCNC: 20 MMOL/L — HIGH (ref 5–17)
AST SERPL-CCNC: 171 U/L — HIGH (ref 10–40)
BILIRUB SERPL-MCNC: 0.5 MG/DL — SIGNIFICANT CHANGE UP (ref 0.2–1.2)
BUN SERPL-MCNC: 94 MG/DL — HIGH (ref 7–23)
CALCIUM SERPL-MCNC: 8.2 MG/DL — LOW (ref 8.4–10.5)
CHLORIDE SERPL-SCNC: 99 MMOL/L — SIGNIFICANT CHANGE UP (ref 96–108)
CO2 SERPL-SCNC: 18 MMOL/L — LOW (ref 22–31)
CREAT SERPL-MCNC: 4.13 MG/DL — HIGH (ref 0.5–1.3)
CULTURE RESULTS: ABNORMAL
EGFR: 9 ML/MIN/1.73M2 — LOW
EGFR: 9 ML/MIN/1.73M2 — LOW
GLUCOSE SERPL-MCNC: 107 MG/DL — HIGH (ref 70–99)
HCT VFR BLD CALC: 29.5 % — LOW (ref 34.5–45)
HGB BLD-MCNC: 9.2 G/DL — LOW (ref 11.5–15.5)
MCHC RBC-ENTMCNC: 27.4 PG — SIGNIFICANT CHANGE UP (ref 27–34)
MCHC RBC-ENTMCNC: 31.2 G/DL — LOW (ref 32–36)
MCV RBC AUTO: 87.8 FL — SIGNIFICANT CHANGE UP (ref 80–100)
METHOD TYPE: SIGNIFICANT CHANGE UP
NRBC # BLD AUTO: 0.34 K/UL — HIGH (ref 0–0)
NRBC # FLD: 0.34 K/UL — HIGH (ref 0–0)
NRBC BLD AUTO-RTO: 5 /100 WBCS — HIGH (ref 0–0)
ORGANISM # SPEC MICROSCOPIC CNT: ABNORMAL
ORGANISM # SPEC MICROSCOPIC CNT: ABNORMAL
PLATELET # BLD AUTO: 168 K/UL — SIGNIFICANT CHANGE UP (ref 150–400)
PMV BLD: 11.4 FL — SIGNIFICANT CHANGE UP (ref 7–13)
POTASSIUM SERPL-MCNC: 4.6 MMOL/L — SIGNIFICANT CHANGE UP (ref 3.5–5.3)
POTASSIUM SERPL-SCNC: 4.6 MMOL/L — SIGNIFICANT CHANGE UP (ref 3.5–5.3)
PROT SERPL-MCNC: 6.4 G/DL — SIGNIFICANT CHANGE UP (ref 6–8.3)
RBC # BLD: 3.36 M/UL — LOW (ref 3.8–5.2)
RBC # FLD: 15.1 % — HIGH (ref 10.3–14.5)
SODIUM SERPL-SCNC: 137 MMOL/L — SIGNIFICANT CHANGE UP (ref 135–145)
SPECIMEN SOURCE: SIGNIFICANT CHANGE UP
WBC # BLD: 6.93 K/UL — SIGNIFICANT CHANGE UP (ref 3.8–10.5)
WBC # FLD AUTO: 6.93 K/UL — SIGNIFICANT CHANGE UP (ref 3.8–10.5)

## 2025-07-29 RX ORDER — TORSEMIDE 10 MG
20 TABLET ORAL
Refills: 0 | Status: DISCONTINUED | OUTPATIENT
Start: 2025-07-29 | End: 2025-07-31

## 2025-07-29 RX ADMIN — Medication 650 MILLIGRAM(S): at 17:44

## 2025-07-29 RX ADMIN — Medication 40 MILLIGRAM(S): at 06:00

## 2025-07-29 RX ADMIN — Medication 50 MILLIGRAM(S): at 17:43

## 2025-07-29 RX ADMIN — BRIMONIDINE TARTRATE 1 DROP(S): 1.5 SOLUTION/ DROPS OPHTHALMIC at 05:59

## 2025-07-29 RX ADMIN — Medication 20 MILLIGRAM(S): at 17:44

## 2025-07-29 RX ADMIN — APIXABAN 2.5 MILLIGRAM(S): 5 TABLET, FILM COATED ORAL at 05:58

## 2025-07-29 RX ADMIN — Medication 650 MILLIGRAM(S): at 05:58

## 2025-07-29 RX ADMIN — CARVEDILOL 3.12 MILLIGRAM(S): 3.12 TABLET, FILM COATED ORAL at 05:58

## 2025-07-29 RX ADMIN — TIMOLOL MALEATE 1 DROP(S): 6.8 SOLUTION OPHTHALMIC at 05:58

## 2025-07-29 RX ADMIN — BRIMONIDINE TARTRATE 1 DROP(S): 1.5 SOLUTION/ DROPS OPHTHALMIC at 17:44

## 2025-07-29 RX ADMIN — TIMOLOL MALEATE 1 DROP(S): 6.8 SOLUTION OPHTHALMIC at 17:44

## 2025-07-29 RX ADMIN — APIXABAN 2.5 MILLIGRAM(S): 5 TABLET, FILM COATED ORAL at 17:44

## 2025-07-30 LAB
ALBUMIN SERPL ELPH-MCNC: 3.1 G/DL — LOW (ref 3.3–5)
ALP SERPL-CCNC: 150 U/L — HIGH (ref 40–120)
ALT FLD-CCNC: 358 U/L — HIGH (ref 10–45)
ANION GAP SERPL CALC-SCNC: 17 MMOL/L — SIGNIFICANT CHANGE UP (ref 5–17)
AST SERPL-CCNC: 108 U/L — HIGH (ref 10–40)
BILIRUB SERPL-MCNC: 0.5 MG/DL — SIGNIFICANT CHANGE UP (ref 0.2–1.2)
BUN SERPL-MCNC: 91 MG/DL — HIGH (ref 7–23)
CALCIUM SERPL-MCNC: 8.3 MG/DL — LOW (ref 8.4–10.5)
CHLORIDE SERPL-SCNC: 99 MMOL/L — SIGNIFICANT CHANGE UP (ref 96–108)
CO2 SERPL-SCNC: 21 MMOL/L — LOW (ref 22–31)
CREAT SERPL-MCNC: 3.94 MG/DL — HIGH (ref 0.5–1.3)
EGFR: 10 ML/MIN/1.73M2 — LOW
EGFR: 10 ML/MIN/1.73M2 — LOW
GLUCOSE SERPL-MCNC: 110 MG/DL — HIGH (ref 70–99)
POTASSIUM SERPL-MCNC: 4.7 MMOL/L — SIGNIFICANT CHANGE UP (ref 3.5–5.3)
POTASSIUM SERPL-SCNC: 4.7 MMOL/L — SIGNIFICANT CHANGE UP (ref 3.5–5.3)
PROT SERPL-MCNC: 6.2 G/DL — SIGNIFICANT CHANGE UP (ref 6–8.3)
SODIUM SERPL-SCNC: 137 MMOL/L — SIGNIFICANT CHANGE UP (ref 135–145)

## 2025-07-30 RX ADMIN — Medication 50 MILLIGRAM(S): at 17:01

## 2025-07-30 RX ADMIN — APIXABAN 2.5 MILLIGRAM(S): 5 TABLET, FILM COATED ORAL at 06:29

## 2025-07-30 RX ADMIN — BRIMONIDINE TARTRATE 1 DROP(S): 1.5 SOLUTION/ DROPS OPHTHALMIC at 06:30

## 2025-07-30 RX ADMIN — TIMOLOL MALEATE 1 DROP(S): 6.8 SOLUTION OPHTHALMIC at 06:30

## 2025-07-30 RX ADMIN — TIMOLOL MALEATE 1 DROP(S): 6.8 SOLUTION OPHTHALMIC at 17:02

## 2025-07-30 RX ADMIN — Medication 650 MILLIGRAM(S): at 06:29

## 2025-07-30 RX ADMIN — Medication 20 MILLIGRAM(S): at 13:43

## 2025-07-30 RX ADMIN — Medication 40 MILLIGRAM(S): at 06:30

## 2025-07-30 RX ADMIN — Medication 50 MILLIGRAM(S): at 06:29

## 2025-07-30 RX ADMIN — BRIMONIDINE TARTRATE 1 DROP(S): 1.5 SOLUTION/ DROPS OPHTHALMIC at 17:02

## 2025-07-30 RX ADMIN — Medication 20 MILLIGRAM(S): at 06:30

## 2025-07-30 RX ADMIN — APIXABAN 2.5 MILLIGRAM(S): 5 TABLET, FILM COATED ORAL at 17:01

## 2025-07-30 RX ADMIN — Medication 650 MILLIGRAM(S): at 17:02

## 2025-07-31 ENCOUNTER — TRANSCRIPTION ENCOUNTER (OUTPATIENT)
Age: 89
End: 2025-07-31

## 2025-07-31 VITALS
RESPIRATION RATE: 18 BRPM | TEMPERATURE: 98 F | HEART RATE: 52 BPM | OXYGEN SATURATION: 98 % | DIASTOLIC BLOOD PRESSURE: 62 MMHG | SYSTOLIC BLOOD PRESSURE: 132 MMHG

## 2025-07-31 PROBLEM — N18.4 CHRONIC KIDNEY DISEASE, STAGE 4 (SEVERE): Chronic | Status: ACTIVE | Noted: 2025-07-25

## 2025-07-31 PROBLEM — E78.5 HYPERLIPIDEMIA, UNSPECIFIED: Chronic | Status: ACTIVE | Noted: 2025-07-25

## 2025-07-31 PROBLEM — Z86.79 PERSONAL HISTORY OF OTHER DISEASES OF THE CIRCULATORY SYSTEM: Chronic | Status: ACTIVE | Noted: 2025-07-25

## 2025-07-31 PROBLEM — I82.409 ACUTE EMBOLISM AND THROMBOSIS OF UNSPECIFIED DEEP VEINS OF UNSPECIFIED LOWER EXTREMITY: Chronic | Status: ACTIVE | Noted: 2025-07-25

## 2025-07-31 PROCEDURE — 83880 ASSAY OF NATRIURETIC PEPTIDE: CPT

## 2025-07-31 PROCEDURE — 82947 ASSAY GLUCOSE BLOOD QUANT: CPT

## 2025-07-31 PROCEDURE — 82330 ASSAY OF CALCIUM: CPT

## 2025-07-31 PROCEDURE — 83605 ASSAY OF LACTIC ACID: CPT

## 2025-07-31 PROCEDURE — 84156 ASSAY OF PROTEIN URINE: CPT

## 2025-07-31 PROCEDURE — 87086 URINE CULTURE/COLONY COUNT: CPT

## 2025-07-31 PROCEDURE — 93005 ELECTROCARDIOGRAM TRACING: CPT

## 2025-07-31 PROCEDURE — 85027 COMPLETE CBC AUTOMATED: CPT

## 2025-07-31 PROCEDURE — 71045 X-RAY EXAM CHEST 1 VIEW: CPT

## 2025-07-31 PROCEDURE — 82570 ASSAY OF URINE CREATININE: CPT

## 2025-07-31 PROCEDURE — 84132 ASSAY OF SERUM POTASSIUM: CPT

## 2025-07-31 PROCEDURE — 76700 US EXAM ABDOM COMPLETE: CPT

## 2025-07-31 PROCEDURE — 84484 ASSAY OF TROPONIN QUANT: CPT

## 2025-07-31 PROCEDURE — 97530 THERAPEUTIC ACTIVITIES: CPT

## 2025-07-31 PROCEDURE — 85018 HEMOGLOBIN: CPT

## 2025-07-31 PROCEDURE — 80053 COMPREHEN METABOLIC PANEL: CPT

## 2025-07-31 PROCEDURE — 93306 TTE W/DOPPLER COMPLETE: CPT

## 2025-07-31 PROCEDURE — 85730 THROMBOPLASTIN TIME PARTIAL: CPT

## 2025-07-31 PROCEDURE — 83550 IRON BINDING TEST: CPT

## 2025-07-31 PROCEDURE — 85610 PROTHROMBIN TIME: CPT

## 2025-07-31 PROCEDURE — 81001 URINALYSIS AUTO W/SCOPE: CPT

## 2025-07-31 PROCEDURE — 85025 COMPLETE CBC W/AUTO DIFF WBC: CPT

## 2025-07-31 PROCEDURE — 83735 ASSAY OF MAGNESIUM: CPT

## 2025-07-31 PROCEDURE — 85014 HEMATOCRIT: CPT

## 2025-07-31 PROCEDURE — 82803 BLOOD GASES ANY COMBINATION: CPT

## 2025-07-31 PROCEDURE — 97161 PT EVAL LOW COMPLEX 20 MIN: CPT

## 2025-07-31 PROCEDURE — 87186 SC STD MICRODIL/AGAR DIL: CPT

## 2025-07-31 PROCEDURE — 82728 ASSAY OF FERRITIN: CPT

## 2025-07-31 PROCEDURE — 87637 SARSCOV2&INF A&B&RSV AMP PRB: CPT

## 2025-07-31 PROCEDURE — 96374 THER/PROPH/DIAG INJ IV PUSH: CPT

## 2025-07-31 PROCEDURE — 97116 GAIT TRAINING THERAPY: CPT

## 2025-07-31 PROCEDURE — 84295 ASSAY OF SERUM SODIUM: CPT

## 2025-07-31 PROCEDURE — 80074 ACUTE HEPATITIS PANEL: CPT

## 2025-07-31 PROCEDURE — 99285 EMERGENCY DEPT VISIT HI MDM: CPT | Mod: 25

## 2025-07-31 PROCEDURE — 83540 ASSAY OF IRON: CPT

## 2025-07-31 PROCEDURE — 82435 ASSAY OF BLOOD CHLORIDE: CPT

## 2025-07-31 PROCEDURE — 93356 MYOCRD STRAIN IMG SPCKL TRCK: CPT

## 2025-07-31 PROCEDURE — 84100 ASSAY OF PHOSPHORUS: CPT

## 2025-07-31 PROCEDURE — 36415 COLL VENOUS BLD VENIPUNCTURE: CPT

## 2025-07-31 RX ORDER — BRIMONIDINE TARTRATE 1.5 MG/ML
1 SOLUTION/ DROPS OPHTHALMIC
Qty: 0 | Refills: 0 | DISCHARGE

## 2025-07-31 RX ORDER — TIMOLOL MALEATE 6.8 MG/ML
1 SOLUTION OPHTHALMIC
Qty: 0 | Refills: 0 | DISCHARGE

## 2025-07-31 RX ORDER — SODIUM BICARBONATE 1 MEQ/ML
1 SYRINGE (ML) INTRAVENOUS
Qty: 0 | Refills: 0 | DISCHARGE
Start: 2025-07-31

## 2025-07-31 RX ADMIN — Medication 20 MILLIGRAM(S): at 05:16

## 2025-07-31 RX ADMIN — Medication 40 MILLIGRAM(S): at 05:17

## 2025-07-31 RX ADMIN — Medication 20 MILLIGRAM(S): at 13:30

## 2025-07-31 RX ADMIN — TIMOLOL MALEATE 1 DROP(S): 6.8 SOLUTION OPHTHALMIC at 05:15

## 2025-07-31 RX ADMIN — CARVEDILOL 3.12 MILLIGRAM(S): 3.12 TABLET, FILM COATED ORAL at 05:17

## 2025-07-31 RX ADMIN — Medication 650 MILLIGRAM(S): at 05:16

## 2025-07-31 RX ADMIN — APIXABAN 2.5 MILLIGRAM(S): 5 TABLET, FILM COATED ORAL at 05:16

## 2025-07-31 RX ADMIN — Medication 50 MILLIGRAM(S): at 05:16

## 2025-07-31 RX ADMIN — BRIMONIDINE TARTRATE 1 DROP(S): 1.5 SOLUTION/ DROPS OPHTHALMIC at 05:16

## (undated) DEVICE — HOOD FLYTE STRYKER HELMET SHIELD

## (undated) DEVICE — SPECIMEN CONTAINER 100ML

## (undated) DEVICE — STRYKER FEMORAL CANAL BRUSH

## (undated) DEVICE — ELCTR BOVIE PENCIL SMOKE EVACUATION

## (undated) DEVICE — PACK TOTAL HIP (2 PACKS)

## (undated) DEVICE — GLV 8 PROTEXIS (WHITE)

## (undated) DEVICE — DRSG DERMABOND 0.7ML

## (undated) DEVICE — DRAPE IOBAN 33" X 23"

## (undated) DEVICE — GLV 7.5 PROTEXIS (WHITE)

## (undated) DEVICE — VENODYNE/SCD SLEEVE CALF LARGE

## (undated) DEVICE — SUT PDO 2 1/2 CIRCLE 40MM NDL 45CM

## (undated) DEVICE — SAW BLADE STRYKER SAGITTAL 25X1.27X90

## (undated) DEVICE — Device

## (undated) DEVICE — STRYKER INTERPULSE HANDPIECE W IRR SUCTION TUBE

## (undated) DEVICE — SOL IRR BAG NS 0.9% 3000ML

## (undated) DEVICE — DRAPE 3/4 SHEET W REINFORCEMENT 56X77"

## (undated) DEVICE — SUT MONOCRYL 3-0 18" PS-2 UNDYED

## (undated) DEVICE — SUT ETHIBOND EXCEL 2 30" OS-4

## (undated) DEVICE — DRSG AQUACEL 3.5 X 10"

## (undated) DEVICE — PRESSURIZER FEM CNL W/O HUB MED

## (undated) DEVICE — POSITIONER FOAM ABDUCTION PILLOW MED (PINK)

## (undated) DEVICE — DRAPE U 47X51" NON STERILE

## (undated) DEVICE — GLV 8.5 PROTEXIS (WHITE)

## (undated) DEVICE — TUBING SUCTION 20FT

## (undated) DEVICE — SOL IRR POUR NS 0.9% 500ML

## (undated) DEVICE — SOL IRR POUR H2O 1000ML

## (undated) DEVICE — DRAPE HIP W POUCHES 87X115X134"

## (undated) DEVICE — NDL HYPO SAFE 22G X 1.5" (BLACK)

## (undated) DEVICE — WARMING BLANKET UPPER ADULT

## (undated) DEVICE — SUT QUILL MONODERM 2-0 3/8 CIRCLE 45CM

## (undated) DEVICE — SYR LUER LOK 20CC